# Patient Record
Sex: FEMALE | Race: WHITE | Employment: OTHER | URBAN - METROPOLITAN AREA
[De-identification: names, ages, dates, MRNs, and addresses within clinical notes are randomized per-mention and may not be internally consistent; named-entity substitution may affect disease eponyms.]

---

## 2019-03-06 ENCOUNTER — APPOINTMENT (OUTPATIENT)
Dept: GENERAL RADIOLOGY | Age: 44
End: 2019-03-06
Payer: MEDICARE

## 2019-03-06 ENCOUNTER — APPOINTMENT (OUTPATIENT)
Dept: CT IMAGING | Age: 44
End: 2019-03-06
Payer: MEDICARE

## 2019-03-06 ENCOUNTER — HOSPITAL ENCOUNTER (EMERGENCY)
Age: 44
Discharge: HOME | End: 2019-03-07
Attending: EMERGENCY MEDICINE
Payer: MEDICARE

## 2019-03-06 DIAGNOSIS — L03.119 CELLULITIS OF LOWER EXTREMITY, UNSPECIFIED LATERALITY: Primary | ICD-10-CM

## 2019-03-06 DIAGNOSIS — R23.3 PETECHIAE: ICD-10-CM

## 2019-03-06 DIAGNOSIS — R60.0 BILATERAL LEG EDEMA: ICD-10-CM

## 2019-03-06 LAB
ANION GAP SERPL CALCULATED.3IONS-SCNC: 8 MMOL/L (ref 3–16)
APTT: 39.3 SEC (ref 26–36)
BASOPHILS ABSOLUTE: 0.1 K/UL (ref 0–0.2)
BASOPHILS RELATIVE PERCENT: 1 %
BUN BLDV-MCNC: 9 MG/DL (ref 7–20)
CALCIUM SERPL-MCNC: 8.5 MG/DL (ref 8.3–10.6)
CHLORIDE BLD-SCNC: 103 MMOL/L (ref 99–110)
CO2: 27 MMOL/L (ref 21–32)
CREAT SERPL-MCNC: 0.6 MG/DL (ref 0.6–1.1)
D DIMER: 2036 NG/ML DDU (ref 0–229)
EOSINOPHILS ABSOLUTE: 0.3 K/UL (ref 0–0.6)
EOSINOPHILS RELATIVE PERCENT: 5.2 %
GFR AFRICAN AMERICAN: >60
GFR NON-AFRICAN AMERICAN: >60
GLUCOSE BLD-MCNC: 113 MG/DL (ref 70–99)
HCT VFR BLD CALC: 37.5 % (ref 36–48)
HEMOGLOBIN: 12.8 G/DL (ref 12–16)
INR BLD: 1.38 (ref 0.86–1.14)
LYMPHOCYTES ABSOLUTE: 2.5 K/UL (ref 1–5.1)
LYMPHOCYTES RELATIVE PERCENT: 46.9 %
MCH RBC QN AUTO: 34.2 PG (ref 26–34)
MCHC RBC AUTO-ENTMCNC: 34 G/DL (ref 31–36)
MCV RBC AUTO: 100.5 FL (ref 80–100)
MONOCYTES ABSOLUTE: 0.5 K/UL (ref 0–1.3)
MONOCYTES RELATIVE PERCENT: 9.5 %
NEUTROPHILS ABSOLUTE: 2 K/UL (ref 1.7–7.7)
NEUTROPHILS RELATIVE PERCENT: 37.4 %
PDW BLD-RTO: 15 % (ref 12.4–15.4)
PLATELET # BLD: 84 K/UL (ref 135–450)
PLATELET SLIDE REVIEW: ABNORMAL
PMV BLD AUTO: 10 FL (ref 5–10.5)
POTASSIUM SERPL-SCNC: 3.7 MMOL/L (ref 3.5–5.1)
PRO-BNP: 70 PG/ML (ref 0–124)
PROTHROMBIN TIME: 15.7 SEC (ref 9.8–13)
RBC # BLD: 3.74 M/UL (ref 4–5.2)
SEDIMENTATION RATE, ERYTHROCYTE: 100 MM/HR (ref 0–20)
SODIUM BLD-SCNC: 138 MMOL/L (ref 136–145)
TROPONIN: <0.01 NG/ML
WBC # BLD: 5.3 K/UL (ref 4–11)

## 2019-03-06 PROCEDURE — 85652 RBC SED RATE AUTOMATED: CPT

## 2019-03-06 PROCEDURE — 6360000004 HC RX CONTRAST MEDICATION: Performed by: EMERGENCY MEDICINE

## 2019-03-06 PROCEDURE — 71260 CT THORAX DX C+: CPT

## 2019-03-06 PROCEDURE — 83880 ASSAY OF NATRIURETIC PEPTIDE: CPT

## 2019-03-06 PROCEDURE — 71045 X-RAY EXAM CHEST 1 VIEW: CPT

## 2019-03-06 PROCEDURE — 85379 FIBRIN DEGRADATION QUANT: CPT

## 2019-03-06 PROCEDURE — 80048 BASIC METABOLIC PNL TOTAL CA: CPT

## 2019-03-06 PROCEDURE — 84484 ASSAY OF TROPONIN QUANT: CPT

## 2019-03-06 PROCEDURE — 85025 COMPLETE CBC W/AUTO DIFF WBC: CPT

## 2019-03-06 PROCEDURE — 85610 PROTHROMBIN TIME: CPT

## 2019-03-06 PROCEDURE — 99284 EMERGENCY DEPT VISIT MOD MDM: CPT

## 2019-03-06 PROCEDURE — 93005 ELECTROCARDIOGRAM TRACING: CPT | Performed by: PHYSICIAN ASSISTANT

## 2019-03-06 PROCEDURE — 85730 THROMBOPLASTIN TIME PARTIAL: CPT

## 2019-03-06 RX ADMIN — IOPAMIDOL 75 ML: 755 INJECTION, SOLUTION INTRAVENOUS at 22:35

## 2019-03-06 ASSESSMENT — ENCOUNTER SYMPTOMS
DIARRHEA: 0
BACK PAIN: 0
STRIDOR: 0
ALLERGIC/IMMUNOLOGIC NEGATIVE: 1
WHEEZING: 0
ABDOMINAL PAIN: 0
ABDOMINAL DISTENTION: 0
CONSTIPATION: 0
SHORTNESS OF BREATH: 0
VOMITING: 0
NAUSEA: 0
COUGH: 0

## 2019-03-07 VITALS
SYSTOLIC BLOOD PRESSURE: 143 MMHG | WEIGHT: 220 LBS | HEART RATE: 88 BPM | OXYGEN SATURATION: 95 % | TEMPERATURE: 98 F | BODY MASS INDEX: 35.36 KG/M2 | DIASTOLIC BLOOD PRESSURE: 82 MMHG | RESPIRATION RATE: 16 BRPM | HEIGHT: 66 IN

## 2019-03-07 LAB
EKG ATRIAL RATE: 81 BPM
EKG DIAGNOSIS: NORMAL
EKG P AXIS: 52 DEGREES
EKG P-R INTERVAL: 152 MS
EKG Q-T INTERVAL: 418 MS
EKG QRS DURATION: 84 MS
EKG QTC CALCULATION (BAZETT): 485 MS
EKG R AXIS: 13 DEGREES
EKG T AXIS: 19 DEGREES
EKG VENTRICULAR RATE: 81 BPM

## 2019-03-07 PROCEDURE — 93010 ELECTROCARDIOGRAM REPORT: CPT | Performed by: INTERNAL MEDICINE

## 2019-03-07 RX ORDER — DOXYCYCLINE 100 MG/1
100 TABLET ORAL 2 TIMES DAILY
Qty: 20 TABLET | Refills: 0 | Status: SHIPPED | OUTPATIENT
Start: 2019-03-07 | End: 2019-03-17

## 2019-04-04 ENCOUNTER — HOSPITAL ENCOUNTER (INPATIENT)
Age: 44
LOS: 4 days | Discharge: HOME | DRG: 442 | End: 2019-04-09
Attending: EMERGENCY MEDICINE | Admitting: INTERNAL MEDICINE
Payer: MEDICARE

## 2019-04-04 DIAGNOSIS — R74.01 TRANSAMINITIS: ICD-10-CM

## 2019-04-04 DIAGNOSIS — R60.0 LEG EDEMA: ICD-10-CM

## 2019-04-04 DIAGNOSIS — D69.6 THROMBOCYTOPENIA (HCC): Primary | ICD-10-CM

## 2019-04-04 DIAGNOSIS — R23.3 PETECHIAE: ICD-10-CM

## 2019-04-04 LAB
A/G RATIO: 0.5 (ref 1.1–2.2)
ALBUMIN SERPL-MCNC: 2.7 G/DL (ref 3.4–5)
ALP BLD-CCNC: 248 U/L (ref 40–129)
ALT SERPL-CCNC: 124 U/L (ref 10–40)
ANION GAP SERPL CALCULATED.3IONS-SCNC: 7 MMOL/L (ref 3–16)
ANISOCYTOSIS: ABNORMAL
APTT: 40.3 SEC (ref 26–36)
AST SERPL-CCNC: 207 U/L (ref 15–37)
BASOPHILS ABSOLUTE: 0.1 K/UL (ref 0–0.2)
BASOPHILS RELATIVE PERCENT: 1 %
BILIRUB SERPL-MCNC: 1 MG/DL (ref 0–1)
BUN BLDV-MCNC: 12 MG/DL (ref 7–20)
CALCIUM SERPL-MCNC: 7.8 MG/DL (ref 8.3–10.6)
CHLORIDE BLD-SCNC: 104 MMOL/L (ref 99–110)
CO2: 25 MMOL/L (ref 21–32)
CREAT SERPL-MCNC: 0.6 MG/DL (ref 0.6–1.1)
EOSINOPHILS ABSOLUTE: 0.2 K/UL (ref 0–0.6)
EOSINOPHILS RELATIVE PERCENT: 3.7 %
FIBRINOGEN: 177 MG/DL (ref 200–397)
GFR AFRICAN AMERICAN: >60
GFR NON-AFRICAN AMERICAN: >60
GLOBULIN: 5.6 G/DL
GLUCOSE BLD-MCNC: 92 MG/DL (ref 70–99)
HCT VFR BLD CALC: 35.7 % (ref 36–48)
HEMOGLOBIN: 12.1 G/DL (ref 12–16)
INR BLD: 1.44 (ref 0.86–1.14)
LACTATE DEHYDROGENASE: 229 U/L (ref 100–190)
LYMPHOCYTES ABSOLUTE: 2.6 K/UL (ref 1–5.1)
LYMPHOCYTES RELATIVE PERCENT: 49.6 %
MACROCYTES: ABNORMAL
MCH RBC QN AUTO: 33.8 PG (ref 26–34)
MCHC RBC AUTO-ENTMCNC: 34 G/DL (ref 31–36)
MCV RBC AUTO: 99.7 FL (ref 80–100)
MONOCYTES ABSOLUTE: 0.6 K/UL (ref 0–1.3)
MONOCYTES RELATIVE PERCENT: 10.6 %
NEUTROPHILS ABSOLUTE: 1.8 K/UL (ref 1.7–7.7)
NEUTROPHILS RELATIVE PERCENT: 35.1 %
PDW BLD-RTO: 16.2 % (ref 12.4–15.4)
PLATELET # BLD: 65 K/UL (ref 135–450)
PMV BLD AUTO: 10.3 FL (ref 5–10.5)
POTASSIUM SERPL-SCNC: 3.5 MMOL/L (ref 3.5–5.1)
PROTHROMBIN TIME: 16.4 SEC (ref 9.8–13)
RBC # BLD: 3.58 M/UL (ref 4–5.2)
SLIDE REVIEW: ABNORMAL
SODIUM BLD-SCNC: 136 MMOL/L (ref 136–145)
TOTAL PROTEIN: 8.3 G/DL (ref 6.4–8.2)
WBC # BLD: 5.2 K/UL (ref 4–11)

## 2019-04-04 PROCEDURE — 85384 FIBRINOGEN ACTIVITY: CPT

## 2019-04-04 PROCEDURE — 85025 COMPLETE CBC W/AUTO DIFF WBC: CPT

## 2019-04-04 PROCEDURE — 99284 EMERGENCY DEPT VISIT MOD MDM: CPT

## 2019-04-04 PROCEDURE — 80074 ACUTE HEPATITIS PANEL: CPT

## 2019-04-04 PROCEDURE — 87341 HEP B SURFACE AG NEUTRLZJ IA: CPT

## 2019-04-04 PROCEDURE — 83615 LACTATE (LD) (LDH) ENZYME: CPT

## 2019-04-04 PROCEDURE — 6370000000 HC RX 637 (ALT 250 FOR IP): Performed by: EMERGENCY MEDICINE

## 2019-04-04 PROCEDURE — 80053 COMPREHEN METABOLIC PANEL: CPT

## 2019-04-04 PROCEDURE — 85730 THROMBOPLASTIN TIME PARTIAL: CPT

## 2019-04-04 PROCEDURE — 85610 PROTHROMBIN TIME: CPT

## 2019-04-04 RX ORDER — DOXYCYCLINE 100 MG/1
100 CAPSULE ORAL 2 TIMES DAILY
Qty: 20 CAPSULE | Refills: 0 | Status: SHIPPED | OUTPATIENT
Start: 2019-04-04 | End: 2019-04-04

## 2019-04-04 RX ORDER — DOXYCYCLINE HYCLATE 100 MG/1
100 CAPSULE ORAL ONCE
Status: COMPLETED | OUTPATIENT
Start: 2019-04-04 | End: 2019-04-04

## 2019-04-04 RX ADMIN — DOXYCYCLINE HYCLATE 100 MG: 100 CAPSULE ORAL at 21:07

## 2019-04-04 ASSESSMENT — PAIN SCALES - GENERAL: PAINLEVEL_OUTOF10: 6

## 2019-04-05 PROBLEM — D69.6 THROMBOCYTOPENIA (HCC): Status: ACTIVE | Noted: 2019-04-05

## 2019-04-05 PROBLEM — Z72.0 TOBACCO ABUSE: Status: ACTIVE | Noted: 2019-04-05

## 2019-04-05 PROBLEM — L03.90 CELLULITIS: Status: ACTIVE | Noted: 2019-04-05

## 2019-04-05 PROBLEM — L03.119 CELLULITIS AND ABSCESS OF LOWER EXTREMITY: Status: ACTIVE | Noted: 2019-04-05

## 2019-04-05 PROBLEM — B19.20 HEPATITIS C: Status: ACTIVE | Noted: 2019-04-05

## 2019-04-05 PROBLEM — B19.10 HEPATITIS B: Status: ACTIVE | Noted: 2019-04-05

## 2019-04-05 PROBLEM — F19.90 IVDU (INTRAVENOUS DRUG USER): Status: ACTIVE | Noted: 2019-04-05

## 2019-04-05 PROBLEM — L02.419 CELLULITIS AND ABSCESS OF LOWER EXTREMITY: Status: ACTIVE | Noted: 2019-04-05

## 2019-04-05 LAB
ALBUMIN SERPL-MCNC: 3 G/DL (ref 3.4–5)
ALP BLD-CCNC: 317 U/L (ref 40–129)
ALT SERPL-CCNC: 145 U/L (ref 10–40)
AMPHETAMINE SCREEN, URINE: POSITIVE
ANION GAP SERPL CALCULATED.3IONS-SCNC: 7 MMOL/L (ref 3–16)
ANTI-NUCLEAR ANTIBODY (ANA): NEGATIVE
AST SERPL-CCNC: 238 U/L (ref 15–37)
BARBITURATE SCREEN URINE: ABNORMAL
BENZODIAZEPINE SCREEN, URINE: ABNORMAL
BILIRUB SERPL-MCNC: 1.4 MG/DL (ref 0–1)
BILIRUBIN DIRECT: 0.6 MG/DL (ref 0–0.3)
BILIRUBIN URINE: NEGATIVE
BILIRUBIN, INDIRECT: 0.8 MG/DL (ref 0–1)
BLOOD SMEAR REVIEW: NORMAL
BLOOD, URINE: NEGATIVE
BUN BLDV-MCNC: 9 MG/DL (ref 7–20)
CALCIUM SERPL-MCNC: 8.4 MG/DL (ref 8.3–10.6)
CANNABINOID SCREEN URINE: ABNORMAL
CHLORIDE BLD-SCNC: 101 MMOL/L (ref 99–110)
CLARITY: CLEAR
CO2: 27 MMOL/L (ref 21–32)
COCAINE METABOLITE SCREEN URINE: ABNORMAL
COLOR: YELLOW
CREAT SERPL-MCNC: 0.6 MG/DL (ref 0.6–1.1)
GFR AFRICAN AMERICAN: >60
GFR NON-AFRICAN AMERICAN: >60
GLUCOSE BLD-MCNC: 79 MG/DL (ref 70–99)
GLUCOSE URINE: NEGATIVE MG/DL
HAV IGM SER IA-ACNC: ABNORMAL
HBV SURFACE AB TITR SER: <3.5 MIU/ML
HCT VFR BLD CALC: 41.1 % (ref 36–48)
HEMOGLOBIN: 13.8 G/DL (ref 12–16)
HEPATITIS B CORE IGM ANTIBODY: REACTIVE
HEPATITIS B SURFACE ANTIGEN INTERPRETATION: REACTIVE
HEPATITIS C ANTIBODY INTERPRETATION: REACTIVE
HIV AG/AB: NORMAL
HIV ANTIGEN: NORMAL
HIV-1 ANTIBODY: NORMAL
HIV-2 AB: NORMAL
IGA: 880 MG/DL (ref 70–400)
IGG: 3626 MG/DL (ref 700–1600)
IGM: 514 MG/DL (ref 40–230)
INR BLD: 1.37 (ref 0.86–1.14)
KETONES, URINE: NEGATIVE MG/DL
LEUKOCYTE ESTERASE, URINE: NEGATIVE
Lab: ABNORMAL
MCH RBC QN AUTO: 33.5 PG (ref 26–34)
MCHC RBC AUTO-ENTMCNC: 33.6 G/DL (ref 31–36)
MCV RBC AUTO: 99.8 FL (ref 80–100)
METHADONE SCREEN, URINE: ABNORMAL
MICROSCOPIC EXAMINATION: ABNORMAL
NITRITE, URINE: NEGATIVE
OPIATE SCREEN URINE: ABNORMAL
OXYCODONE URINE: ABNORMAL
PDW BLD-RTO: 16.6 % (ref 12.4–15.4)
PH UA: 8
PH UA: 8 (ref 5–8)
PHENCYCLIDINE SCREEN URINE: ABNORMAL
PLATELET # BLD: 71 K/UL (ref 135–450)
PMV BLD AUTO: 11 FL (ref 5–10.5)
POTASSIUM SERPL-SCNC: 3.9 MMOL/L (ref 3.5–5.1)
PROPOXYPHENE SCREEN: ABNORMAL
PROTEIN UA: NEGATIVE MG/DL
PROTHROMBIN TIME: 15.6 SEC (ref 9.8–13)
RBC # BLD: 4.12 M/UL (ref 4–5.2)
SODIUM BLD-SCNC: 135 MMOL/L (ref 136–145)
SPECIFIC GRAVITY UA: 1.02 (ref 1–1.03)
TOTAL PROTEIN: 10.1 G/DL (ref 6.4–8.2)
URINE REFLEX TO CULTURE: ABNORMAL
URINE TYPE: ABNORMAL
UROBILINOGEN, URINE: 2 E.U./DL
WBC # BLD: 4.9 K/UL (ref 4–11)

## 2019-04-05 PROCEDURE — 87902 NFCT AGT GNTYP ALYS HEP C: CPT

## 2019-04-05 PROCEDURE — 85027 COMPLETE CBC AUTOMATED: CPT

## 2019-04-05 PROCEDURE — 87517 HEPATITIS B DNA QUANT: CPT

## 2019-04-05 PROCEDURE — G0378 HOSPITAL OBSERVATION PER HR: HCPCS

## 2019-04-05 PROCEDURE — 86704 HEP B CORE ANTIBODY TOTAL: CPT

## 2019-04-05 PROCEDURE — 86255 FLUORESCENT ANTIBODY SCREEN: CPT

## 2019-04-05 PROCEDURE — 87350 HEPATITIS BE AG IA: CPT

## 2019-04-05 PROCEDURE — 1200000000 HC SEMI PRIVATE

## 2019-04-05 PROCEDURE — 84155 ASSAY OF PROTEIN SERUM: CPT

## 2019-04-05 PROCEDURE — 36415 COLL VENOUS BLD VENIPUNCTURE: CPT

## 2019-04-05 PROCEDURE — 86707 HEPATITIS BE ANTIBODY: CPT

## 2019-04-05 PROCEDURE — 87390 HIV-1 AG IA: CPT

## 2019-04-05 PROCEDURE — 82784 ASSAY IGA/IGD/IGG/IGM EACH: CPT

## 2019-04-05 PROCEDURE — 2580000003 HC RX 258: Performed by: INTERNAL MEDICINE

## 2019-04-05 PROCEDURE — 81003 URINALYSIS AUTO W/O SCOPE: CPT

## 2019-04-05 PROCEDURE — 80307 DRUG TEST PRSMV CHEM ANLYZR: CPT

## 2019-04-05 PROCEDURE — 99223 1ST HOSP IP/OBS HIGH 75: CPT | Performed by: INTERNAL MEDICINE

## 2019-04-05 PROCEDURE — 6370000000 HC RX 637 (ALT 250 FOR IP): Performed by: FAMILY MEDICINE

## 2019-04-05 PROCEDURE — 2580000003 HC RX 258: Performed by: FAMILY MEDICINE

## 2019-04-05 PROCEDURE — 86702 HIV-2 ANTIBODY: CPT

## 2019-04-05 PROCEDURE — 87522 HEPATITIS C REVRS TRNSCRPJ: CPT

## 2019-04-05 PROCEDURE — 87040 BLOOD CULTURE FOR BACTERIA: CPT

## 2019-04-05 PROCEDURE — 82595 ASSAY OF CRYOGLOBULIN: CPT

## 2019-04-05 PROCEDURE — 80076 HEPATIC FUNCTION PANEL: CPT

## 2019-04-05 PROCEDURE — 84311 SPECTROPHOTOMETRY: CPT

## 2019-04-05 PROCEDURE — 86038 ANTINUCLEAR ANTIBODIES: CPT

## 2019-04-05 PROCEDURE — 86706 HEP B SURFACE ANTIBODY: CPT

## 2019-04-05 PROCEDURE — 6360000002 HC RX W HCPCS: Performed by: FAMILY MEDICINE

## 2019-04-05 PROCEDURE — 86701 HIV-1ANTIBODY: CPT

## 2019-04-05 PROCEDURE — 80048 BASIC METABOLIC PNL TOTAL CA: CPT

## 2019-04-05 PROCEDURE — 84165 PROTEIN E-PHORESIS SERUM: CPT

## 2019-04-05 PROCEDURE — 85610 PROTHROMBIN TIME: CPT

## 2019-04-05 PROCEDURE — 87641 MR-STAPH DNA AMP PROBE: CPT

## 2019-04-05 RX ORDER — SODIUM CHLORIDE 9 MG/ML
INJECTION, SOLUTION INTRAVENOUS CONTINUOUS
Status: DISCONTINUED | OUTPATIENT
Start: 2019-04-05 | End: 2019-04-09

## 2019-04-05 RX ORDER — TRAZODONE HYDROCHLORIDE 50 MG/1
150 TABLET ORAL NIGHTLY
Status: ON HOLD | COMMUNITY
Start: 2016-10-06 | End: 2019-04-09 | Stop reason: HOSPADM

## 2019-04-05 RX ORDER — ONDANSETRON 2 MG/ML
4 INJECTION INTRAMUSCULAR; INTRAVENOUS EVERY 6 HOURS PRN
Status: DISCONTINUED | OUTPATIENT
Start: 2019-04-05 | End: 2019-04-09 | Stop reason: HOSPADM

## 2019-04-05 RX ORDER — NICOTINE 21 MG/24HR
1 PATCH, TRANSDERMAL 24 HOURS TRANSDERMAL DAILY
Status: DISCONTINUED | OUTPATIENT
Start: 2019-04-05 | End: 2019-04-09 | Stop reason: HOSPADM

## 2019-04-05 RX ORDER — SODIUM CHLORIDE 0.9 % (FLUSH) 0.9 %
10 SYRINGE (ML) INJECTION PRN
Status: DISCONTINUED | OUTPATIENT
Start: 2019-04-05 | End: 2019-04-09 | Stop reason: HOSPADM

## 2019-04-05 RX ORDER — SODIUM CHLORIDE 0.9 % (FLUSH) 0.9 %
10 SYRINGE (ML) INJECTION EVERY 12 HOURS SCHEDULED
Status: DISCONTINUED | OUTPATIENT
Start: 2019-04-05 | End: 2019-04-09 | Stop reason: HOSPADM

## 2019-04-05 RX ORDER — GABAPENTIN 300 MG/1
800 CAPSULE ORAL 3 TIMES DAILY PRN
Status: ON HOLD | COMMUNITY
Start: 2017-01-31 | End: 2019-04-09 | Stop reason: HOSPADM

## 2019-04-05 RX ADMIN — CEFEPIME HYDROCHLORIDE 2 G: 2 INJECTION, POWDER, FOR SOLUTION INTRAVENOUS at 12:16

## 2019-04-05 RX ADMIN — Medication 10 ML: at 21:29

## 2019-04-05 RX ADMIN — VANCOMYCIN HYDROCHLORIDE 1500 MG: 10 INJECTION, POWDER, LYOPHILIZED, FOR SOLUTION INTRAVENOUS at 13:42

## 2019-04-05 RX ADMIN — SODIUM CHLORIDE: 9 INJECTION, SOLUTION INTRAVENOUS at 15:41

## 2019-04-05 RX ADMIN — Medication 10 ML: at 08:20

## 2019-04-05 ASSESSMENT — PAIN DESCRIPTION - PAIN TYPE
TYPE: ACUTE PAIN

## 2019-04-05 ASSESSMENT — PAIN DESCRIPTION - LOCATION
LOCATION: LEG

## 2019-04-05 ASSESSMENT — PAIN DESCRIPTION - ORIENTATION
ORIENTATION: RIGHT;LEFT
ORIENTATION: RIGHT;LEFT
ORIENTATION: LEFT

## 2019-04-05 ASSESSMENT — PAIN SCALES - GENERAL
PAINLEVEL_OUTOF10: 8
PAINLEVEL_OUTOF10: 6
PAINLEVEL_OUTOF10: 6

## 2019-04-05 NOTE — CARE COORDINATION
Met w/pt regarding drug rehab/treatment programs. Pt stated she isn't using everyday, stated she uses \"street drugs\"-stated specifically heroin. She stated she isn't interested in an inpt stay, as she has to work. Provided pt w/a list of drug treatment facilities. Informed pt that she would need to call if she is interested in pursuing treatment.   Pt understands and accepted list.  Electronically signed by ISABELL Mcpherson on 4/5/2019 at 3:33 PM

## 2019-04-05 NOTE — CARE COORDINATION
Discharge Planning Assessment  RN/SW discharge planner met with patient/(and family member) to discuss reason for admission, current living situation, and potential needs at the time of discharge    Demographics/Insurance verified Yes    Current type of dwelling: currently staying in hotel-stated working on securing housing    Living arrangements: w/boyfriend    Level of function/Support:stated independent w/ADL's    PCP:none-provided list    Last Visit to PCP:na    DME:stated none    Active with any community resources/agencies/skilled home care:na    Medication compliance issues:na-no PCP    Financial issues that could impact healthcare: no PCP at this time    Transportation at the time of discharge: stated none    Tentative discharge plan:Pt would benefit from meds to beds as she has no transportation. Informed pt that meds to beds is not avail on weekend and would need to use pharmacy in community. Will likely need transportation assistance on dc. Pt stated she has no PCP-provided her w/PCP list. Otherwise pt stated no further d/c needs.     Electronically signed by ISABELL Simons on 4/5/2019 at 12:47 PM

## 2019-04-05 NOTE — ED NOTES
Report given to 5T RN over phone. Pt alert and oriented and shows no signs of distress at time of transfer to  569. Pt taken to room by ED Tech in wheelchair.         Juni Conte RN  04/05/19 3712

## 2019-04-05 NOTE — PROGRESS NOTES
Pt admitted to [address] from ED. A&Ox4. VSS. Pt oriented to room and call light. No further needs at this time. Will continue to monitor.

## 2019-04-05 NOTE — CONSULTS
Infectious Diseases   Consult Note        Admission Date: 4/4/2019  Hospital Day: Hospital Day: 3  Attending: Carlton Donato MD  Date of service: 4/5/19    Presenting complaint:   Chief Complaint   Patient presents with    Rash     to bilat lower legs treated for \"infection\" in right leg last month. no fevers pt states her legs are both swollen \"but not as bad as last time\"        Reason for admission: Thrombocytopenia ([address] [D69.6]  Thrombocytopenia ([address] [D69.6]  Cellulitis [L03.90]  Thrombocytopenia ([address] [D69.6]    Chief complaint/ Reason for consult: b/l leg rash    Problem list:       Patient Active Problem List   Diagnosis Code    Knee pain, bilateral M25.561, M25.562    Right knee DJD M17.11    Left knee DJD M17.12    Thrombocytopenia ([address] D69.6    Cellulitis and abscess of lower extremity L03.119, L02.419    Tobacco abuse Z72.0    IVDU (intravenous drug user) F19.90    Acute hepatitis B B16.9    Hepatitis C B19.20    Cellulitis L03.90    Petechiae R23.3    Leg edema R60.0    Transaminitis R74.0    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 old female who  has a past medical history of Arthritis and Asthma. with following problems:    · Left leg rash - vasculitis rash suspected secondary to Hep C or illicit drugs  · Acute hepatitis B with tranasminitis  · Chronic hepatitis c  · IV drug user - amphetamine abuse  · Worsening thrombocytopenia  · History of asthma  · Previous CT scan concerning for Pulmonary hypertension  · Obesity Class 2 due to excess calorie intake : Body mass index is 35.51 kg/m².         Discussion:      No sign of infection, Rash seems related to thrombocytopenia. Also need to r/o vasculitis related to Hep C or related to illicit drugs. Hep C can also cause rashes by causing cryoglobulinemia or porphyrias         Plan:     Diagnostic Workup:    · Will order urine tox screen  · Will order HIV screen  · Get Hep C RNA quant PCR and Hep C genotype  · Will order LEIGH with reflex and ANCA  · Will order nasal MRSA screen  · Continue to follow fever curve, WBC count and blood cultures  · Check cryoglobulin level and prophyrin level  · Follow up on liverand renal functions closely    Antimicrobials:    · No role of antibiotics. Will stop iv vancomycin  · Will stop iv cefepime  · Will recommend watching him off antibiotics  · He is afebrile. · Pain control  · DVT prophyalxis  · Discussed the above plan with patient and RN         I/v access Management:    · Continue to monitor i.v access sites for erythema, induration, discharge or tenderness. · As always, continue efforts to minimizetubes/lines/drains as clinically appropriate to reduce chances of line associated infections. Patient education and counseling:      · The patient was educated in detailabout the side-effects of various antibiotics and things to watch for like new rashes, lip swelling, severe reaction, worsening diarrhea, break through fever etc.  · Discussed patient'scondition and what to expect. All of the patient's questions were addressed in a satisfactory manner and patient verbalized understanding all instructions. Risk of Complications/Morbidity: High     Illicit drug use and tobacco use disorder counselin. I have counseled the patient extensively about risks of using illicit drugs and have encouraged the patient to maintain a healthy drug-free lifestyle. Information was given about various substance use prevention programs available in the area and their websites including www. addicted. org, www.madd. org, www.catsober. org, www.[facility]. com and www. addictionservicescouncil.org.  2. I have counseled the patient extensively about risks of smoking and have encouraged the patient to maintain a healthy smoke-free lifestyle. Information was given about various smoking cessation programs and their websites like www.smokefree.gov, [place]. quitlogix. org as well as help lines like 1-800-QUIT-NOW and 1-800-LUNG-[place]. Thank you for involving me in the care of your patient. I will continue to follow. If you have any additional questions, please do not hesitate to contact me. Subjective:       HPI: Orvilla Kanner is a 38 y.o. female patient, who was seen at the request of Dr. Abril Mehta MD.    History was obtained from chart review and the patient. The patient was admitted on 4/4/2019. I have been consulted to see the patient for above mentioned reason(s). The patient has multiple medical comorbidities, and presented to the ER for diffuse rash on bilateral lower extremities with burning pain in the legs, more pronounced in the left leg as compared to right    The patient apparently had an ER visit about a month ago for similar presentation and was discharged on doxycycline from ER. She does not have a PCP and did not follow-up with anyone as outpatient    She is an active IV drug user      I have been asked to see the patient for this complicated infection. Past Medical History: All past medical history reviewed today. Past Medical History:   Diagnosis Date    Arthritis     Asthma          Past Surgical History: All pastsurgical history was reviewed today. History reviewed. No pertinent surgical history. Social History:  All social andepidemiologic history was reviewed and updated by me today as needed. · Tobacco use:   reports that she has been smoking cigarettes. She has never used smokeless tobacco.  · Alcohol use:   reports that she does not drink alcohol.   · Currently lives in: [facility]  · reports that she does not use drugs. Immunization History: All immunization history was reviewed by me today. There is no immunization history on file for this patient. Family History: All family history was reviewed today. Problem Relation Age of Onset    Arthritis Other     Asthma Other     Cancer Other     Diabetes Other     High Blood Pressure Other          Medications: All current and past medications were reviewed. Medications Prior to Admission: gabapentin (NEURONTIN) 300 MG capsule, Take 800 mg by mouth 3 times daily as needed. traZODone (DESYREL) 50 MG tablet, Take 150 mg by mouth nightly     morphine  4 mg Intravenous Once    sodium chloride flush  10 mL Intravenous 2 times per day    nicotine  1 patch Transdermal Daily       Current antibiotics: All antibiotics and their doses were reviewed by me    Recent Abx Admin                   vancomycin (VANCOCIN) 1,500 mg in dextrose 5 % 250 mL IVPB (mg) 1,500 mg New Bag 04/05/19 1342    cefepime (MAXIPIME) 2 g IVPB minibag (g) 2 g New Bag 04/05/19 1216                   REVIEW OF SYSTEMS:       Review of Systems   Constitutional: Negative for chills, diaphoresis and unexpected weight change. HENT: Negative for congestion, ear discharge, ear pain, facial swelling, hearing loss, rhinorrhea and trouble swallowing. Eyes: Negative for photophobia, discharge, redness and visual disturbance. Respiratory: Negative for apnea, cough, choking, chest tightness, shortness of breath and stridor. Cardiovascular: Negative for chest pain and palpitations. Gastrointestinal: Negative for abdominal pain, blood in stool, diarrhea and nausea. Endocrine: Negative for polydipsia, polyphagia and polyuria. Genitourinary: Negative for difficulty urinating, dysuria, frequency, hematuria, menstrual problem and vaginal discharge. Musculoskeletal: Negative for arthralgias, joint swelling, myalgias and neck stiffness.    Skin: Positive for rash (Both legs, severe on the left leg). Negative for color change. Allergic/Immunologic: Negative for immunocompromised state. Neurological: Negative for dizziness, seizures, speech difficulty, light-headedness and headaches. Hematological: Negative for adenopathy. Psychiatric/Behavioral: Negative for agitation, hallucinations and suicidal ideas. Objective:       PHYSICAL EXAM:      Vitals:   Vitals:    04/05/19 1630 04/05/19 1834 04/05/19 2126 04/05/19 2333   BP: (!) 162/99 (!) 145/89 (!) 144/92 (!) 146/87   Pulse: 82 84 88 78   Resp: 14 16 16 16   Temp: 98.2 °F (36.8 °C) 98 °F (36.7 °C) 98.5 °F (36.9 °C) 98.3 °F (36.8 °C)   TempSrc: Oral Oral Oral Oral   SpO2: 99% 99% 97% 98%   Weight:       Height:           Physical Exam   Constitutional: She is oriented to person, place, and time. She appears well-developed. No distress. HENT:   Head: Normocephalic. Right Ear: External ear normal.   Left Ear: External ear normal.   Nose: Nose normal.   Mouth/Throat: Oropharynx is clear and moist.   Eyes: Pupils are equal, round, and reactive to light. Conjunctivae are normal. Right eye exhibits no discharge. Left eye exhibits no discharge. No scleral icterus. Neck: Normal range of motion. Neck supple. Cardiovascular: Normal rate and regular rhythm. Exam reveals no friction rub. No murmur heard. Pulmonary/Chest: Effort normal. No stridor. She has no wheezes. She has no rales. She exhibits no tenderness. Abdominal: Soft. She exhibits no mass. There is no tenderness. There is no rebound and no guarding. Musculoskeletal: She exhibits no edema or tenderness. Lymphadenopathy:     She has no cervical adenopathy. Neurological: She is alert and oriented to person, place, and time. She exhibits normal muscle tone. Skin: Skin is warm and dry. Rash (Bilateral legs, severe on the left leg) noted. She is not diaphoretic. No erythema. Psychiatric: She has a normal mood and affect.  Judgment normal. Nursing note and vitals reviewed. Lines: All vascular access sites are healthy with no local erythema, discharge or tenderness. Intake and output:     I/O last 3 completed shifts: In: 368 [I.V.:368]  Out: -     Lab Data:   All available labs were reviewed by me today. CBC:   Recent Labs     04/04/19 2111 04/05/19  1635   WBC 5.2 4.9   RBC 3.58* 4.12   HGB 12.1 13.8   HCT 35.7* 41.1   PLT 65* 71*   MCV 99.7 99.8   MCH 33.8 33.5   MCHC 34.0 33.6   RDW 16.2* 16.6*        BMP:  Recent Labs     04/04/19 2111 04/05/19  1635    135*   K 3.5 3.9    101   CO2 25 27   BUN 12 9   CREATININE 0.6 0.6   CALCIUM 7.8* 8.4   GLUCOSE 92 79        Hepatic FunctionPanel:   Lab Results   Component Value Date    ALKPHOS 317 04/05/2019     04/05/2019     04/05/2019    PROT 10.1 04/05/2019    BILITOT 1.4 04/05/2019    BILIDIR 0.6 04/05/2019    IBILI 0.8 04/05/2019    LABALBU 3.0 04/05/2019       CPK: No results found for: CKTOTAL  ESR:   Lab Results   Component Value Date    SEDRATE 100 (H) 03/06/2019     CRP: No results found for: CRP      Imaging: All pertinent images and reports for the current visit were reviewed by meduring this visit. VL EXTREMITY VENOUS BILATERAL    (Results Pending)   US GALLBLADDER RUQ    (Results Pending)       Outside records:    Labs, Microbiology, Radiology and pertinent results from Care everywhere, if available, were reviewed as a part ofthe consultation. Known drug Allergies: All allergies were reviewed and updated    Allergies   Allergen Reactions    Darvocet A500 [Propoxyphene N-Acetaminophen]     Penicillins          Please note that this chart was generated using Dragon dictation software. Although every effort was made to ensure the accuracy of this automated transcription, some errors in transcription may have occurred inadvertently.  If you may need any clarification, please do not hesitate to contact me through [facility] or at the phone number provided below with my electronic signature.       Harish Husain MD, MPH  4/6/2019, 12:30 AM  [facility] Infectious Disease   Office: [phone]  Fax: [phone]  Tuesday AM clinic:   [address] [facility] [address]  Thursday AM clinic: [address] [facility]

## 2019-04-05 NOTE — CONSULTS
Gastroenterology Consult Note      Patient: Ginny Santos  : 1975  Acct#:      Date:  2019    Subjective:       History of Present Illness  Patient is a 38 y.o.  female admitted with Thrombocytopenia ([facility] [address] [D69.6]  Thrombocytopenia ([facility] [address] [D69.6] who is seen in consult for elevated liver enzymes. She has h/o asthma, arthritis, and IVDA. She came to the ED a month ago for rash of the BLE. She was treated with 10 day course of doxycycline but didn't f/u with anyone. States the rash resolved then returned the other day. This was concerning for a vasculitis. Also noted to have thrombocytopenia. Noted to have elevated transaminases. No prior dx of liver disease. No alcohol use. Has used IV heroin off and on for 9 years. Last used 1 month ago. Has never been checked for Hepatitis B or C in the past. No alcohol use. Takes ibuprofen occasionally but otherwise no medications (other than doxycycline) or herbals. Sometimes has a pressure sensation in the RUQ but no pain. No N/V. No melena or hematochezia. Past Medical History:   Diagnosis Date    Arthritis     Asthma       History reviewed. No pertinent surgical history. Past Endoscopic History: none     Admission Meds  No current facility-administered medications on file prior to encounter. No current outpatient medications on file prior to encounter.             Allergies  Allergies   Allergen Reactions    Darvocet A500 [Propoxyphene N-Acetaminophen]     Penicillins       Social   Social History     Tobacco Use    Smoking status: Current Every Day Smoker     Types: Cigarettes    Smokeless tobacco: Never Used   Substance Use Topics    Alcohol use: No        Family History   Problem Relation Age of Onset    Arthritis Other     Asthma Other     Cancer Other     Diabetes Other     High Blood Pressure Other       Review of Systems  Constitutional: negative for fevers, chills, sweats    Ears, nose, mouth, throat, and face: negative for nasal congestion and sore throat   Respiratory: negative for cough and shortness of breath   Cardiovascular: negative for chest pain and dyspnea   Gastrointestinal: see hpi   Genitourinary:negative for dysuria and frequency   Integument/breast: negative for pruritus and rash   Hematologic/lymphatic: negative for bleeding and easy bruising   Musculoskeletal:negative for arthralgias and myalgias   Neurological: negative for dizziness and weakness       Physical Exam  Blood pressure (!) 146/90, pulse 74, temperature 98.5 °F (36.9 °C), temperature source Oral, resp. rate 16, height 5' 6\" (1.676 m), weight 220 lb (99.8 kg), SpO2 99 %, not currently breastfeeding. General appearance: alert, cooperative, no distress, appears stated age  Eyes: Anicteric  Head: Normocephalic, without obvious abnormality  Lungs: clear to auscultation bilaterally, Normal Effort  Heart: regular rate and rhythm, normal S1 and S2, no murmurs or rubs  Abdomen: soft, non-tender. Bowel sounds normal. No masses,  no organomegaly. Extremities: atraumatic, no cyanosis. Trace edema BLE  Skin: warm and dry, no jaundice, spider angiomata chest, macules over arms. purple macules and large patches over BLE. Neuro: Grossly intact, A&OX3  Musculoskeletal: 5/5  strength BUE      Data Review:    Recent Labs     04/04/19 2111   WBC 5.2   HGB 12.1   HCT 35.7*   MCV 99.7   PLT 65*     Recent Labs     04/04/19 2111      K 3.5      CO2 25   BUN 12   CREATININE 0.6     Recent Labs     04/04/19 2111   *   *   BILITOT 1.0   ALKPHOS 248*     No results for input(s): LIPASE, AMYLASE in the last 72 hours. Recent Labs     04/04/19 2111   PROTIME 16.4*   INR 1.44*     No results for input(s): PTT in the last 72 hours. No results for input(s): OCCULTBLD in the last 72 hours. Assessment:     Active Problems: Thrombocytopenia [address] [address]  Resolved Problems:    * No resolved hospital problems. *    Hepatitis - , . Alk phos 248 with normal bili. transaminases in the 50-60's in 2016 per labs in [facility]. No prior dx of liver disease. Viral hepatitis panel is pending. She is high risk for Hep B and C given IV drug use and these could cause cryoglobulinemia and vasculitis. Will need imaging of liver. There are signs of synthetic liver dysfunction (INR elevated at 1.44, albumin low at 2.7). Doxycycline can cause elevated liver enzymes. Thrombocytopenia - heme working up. Petechial rash - concerning for vasculitis. Cryoglobulin sent per heme. IVDA    Recommendations:   - f/u acute hepatitis panel  - f/u cryoglobulin per heme  - HIV labs have been sent  - check US of liver vs CT  - follow liver enzymes, INR  - heme following and ID consulted as well  - if Hep C positive, then check genotype and RNA level. If Hep B positive, check DNA level.   - drug cessation. SW consult for rehab programs. Discussed with Dr. Linnette Lama, [address]    I have personally performed a face to face diagnostic evaluation on this patient. I have interviewed and examined the patient and I agree with the findings and recommended plan of care. 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 Ventral palpable hernia but nontender, lower ext bilat rash but no skin ulcers and no apparent ischemia of digits, abnl transaminases, appears acute hep b (+core igm ab and +surface ag) and exposure to hep c (+hep c ab) with neg hiv. rec check hep c rna and genotype to see if past hep c cleared or if active infection. Await cryogolubulin level. Check u/s liver (hold off iv contrast /ct as creat nl but if cryoglobulin + can affect kidney in future). CT chest march 2019 upper abd images reported nl. Can consider future ct abdomen (also to assess ? mid abd hernia) when acute issues resolve to avoid stress/challenge of kidney. Once all data obtained then further recs. In abscence of more severe vasculitis manifestions (renal/cardiac/pulm disease/skin ulcers) may wait to see if clears hep b on own.  Recheck cbc,cmp,inr today and in am.       Maida Bocanegra MD  [address]

## 2019-04-05 NOTE — H&P
[place] [address]                     350 Located within [facility], [address]                              HISTORY AND PHYSICAL    PATIENT NAME: Gina Graham                 :        1975  MED REC NO:   [id number]                          ROOM:       3846  ACCOUNT NO:   [de-identified]                           ADMIT DATE: 2019  PROVIDER:     Greg Ayala MD    DATE OF SERVICE:  I obtained the history and performed a physical exam  on the patient in the emergency room on 2019. CHIEF COMPLAINT:  Rash on the legs. HISTORY OF PRESENT ILLNESS:  A 56-year-old  female who presents  to the hospital with chief complaints of what she describes as 3-4 days  of petechial initially spotty now confluent increasing rash on bilateral  lower extremity that she states is more on the left than the right,  associated with some burning pain in that area without nausea, vomiting,  fevers or chills. She notes this is similar to the rash she had a month  ago when she presented to the hospital emergency room, was given an  antibiotic and was discharged home. She was supposed to followup with  physician in the outpatient setting, but she never did. PAST MEDICAL HISTORY:  Obesity with a BMI of 35.6 kg/m2. PAST SURGICAL HISTORY:  No major procedures. ALLERGIC HISTORY:  DARVON. FAMILY HISTORY:  Reviewed by me and is currently noncontributory. SOCIAL HISTORY:  Active intravenous drug user with last use around a  week and half to two weeks ago. Smokes cigarettes. MEDICATIONS:  The patient's home medication list reviewed. The patient  is not on any medications at home. REVIEW OF SYSTEMS:  The patient's review of systems is significant for  the rash on the bilateral lower extremity and per the history of present  illness. All other systems have been reviewed and are negative except  for the history of present illness.     PHYSICAL EXAMINATION:  VITAL SIGNS:  Temperature 98.1, respiratory rate 16, pulse 80, blood  pressure 151/89, saturating 99%. CNS:  Alert, awake and oriented to time, place and person. PSYCH:  The patient is cooperative. EYES:  Pupils are reactive to light. Extraocular muscle movements are  intact. RESPIRATORY SYSTEM:  No rales, rubs, or rhonchi. CVS:  S1, S2 are heard. No murmurs or rubs. ABDOMEN:  Soft. MUSCULOSKELETAL:  No acute deformities. SKIN:  The patient has got significant evidence of petechial rash over  the bilateral lower extremity slightly palpable/raised. In the left  lower extremity, there are areas of confluence without any evidence of  surrounding cellulitis. LYMPH NODE:  Lymph node examination does not reveal any abnormal  lymphadenopathy. DIAGNOSTIC DATA:  CBC showed white count of 5.8 with hemoglobin of 12.1. Comprehensive metabolic panel showed alk phos 248, , . INR is 1.44. Lactate dehydrogenase is 229. Fibrinogen level is 177  slightly low. REVIEW OF PREVIOUS MEDICAL RECORDS:  Shows a CT abdomen and pelvis with  contrast, a FAST exam that showed no evidence of abnormalities that was  from 2016. The patient does not seem to have had any serological  testing that I can see. BLOOD WORK:  We have requested hepatitis panel acute. CONSULTATIONS:  ER requested a consultation to Hematology. ASSESSMENT:  1. Thrombocytopenia and what appears to be possible early peripheral  vasculitis. 2.  Acute/chronic hepatitis, probably hepatitis C related to the  patient's active intravenous drug use. 3.  Active intravenous drug use. PLAN OF CARE:  The patient has been admitted to observation. Hematology  consult was requested by the ER. However, the patient will likely need  a GI consultation which can be possibly done as an outpatient especially  after the results of the patient's hepatitis profile are back.    Screening LEIGH titer is being requested by me since this patient has  chronic hep C this might be one of the extraintestinal manifestation of  the patient's hepatitis in the form of a vasculitis picture. This could  also be related to the fact that the patient has significant liver  function derangement in the form of cirrhosis of the liver and resultant  thrombocytopenia as well. Most of this workup can be done as an  outpatient and hence my expectation would be that the patient will  possibly get discharged to home with an outpatient followup that she has  a responsible adult needs to maintain. CODE STATUS:  Full. EXPECTED LENGTH OF STAY:  Less than two midnights based on plan of care  above. DISPOSITION:  Observation.         Eyad Souza MD    D: 04/05/2019 4:50:00       T: 04/05/2019 6:35:36     [email or web address]  Job#: [id number]     Doc#: [id number]    CC:

## 2019-04-05 NOTE — CONSULTS
Pharmacy to dose vancomycin per hospitalist. No previous dose vancomycin given. Start vancomycin 1500mg q12h. Trough 4/6 2330 before 4th dose. Hold dose for trough > 20. ID consult pending.     Indra James PharmD, BCPS

## 2019-04-05 NOTE — PROGRESS NOTES
Hospital Medicine Progress Note     Date:  4/5/2019    PCP: . None (Inactive) (Tel: None)    Date of Admission: 4/4/2019      Brief hospital course: Pt admitted for LE cellulitis, thrombocytopenia, acute hepatitis, she admits she is an IVDU for the past 9 mos. Subjective  States her pain in left LE is worsening. Objective  Physical exam:  Vitals: BP (!) 146/90   Pulse 74   Temp 98.5 °F (36.9 °C) (Oral)   Resp 16   Ht 5' 6\" (1.676 m)   Wt 220 lb (99.8 kg)   SpO2 99%   BMI 35.51 kg/m²   Gen: Not in distress. Alert. Head: Normocephalic. Atraumatic. Eyes: EOMI. Good acuity. ENT: Oral mucosa moist  Neck: No JVD. No obvious thyromegaly. CVS: Nml S1S2, no MRG, RRR  Pulmomary: Clear bilaterally. No crackles. No wheezes. Gastrointestinal: Soft, NT/ND. Positive bowel sounds. Musculoskeletal: No edema. Warm  Neuro: No focal deficit. Moves extremity spontaneously. Psychiatry: Appropriate affect. Not agitated. Skin: petechial rash B/L LE, erythema of left LE with TTP and warmth      24HR INTAKE/OUTPUT:  No intake or output data in the 24 hours ending 04/05/19 1513  No intake/output data recorded. No intake/output data recorded. Meds:    sodium chloride flush  10 mL Intravenous 2 times per day    cefepime  2 g Intravenous Q12H    nicotine  1 patch Transdermal Daily    vancomycin  1,500 mg Intravenous Q12H       Infusions:    sodium chloride           PRN Meds: sodium chloride flush, ondansetron    Labs/imaging:  CBC:   Recent Labs     04/04/19 2111   WBC 5.2   HGB 12.1   PLT 65*         BMP:    Recent Labs     04/04/19 2111      K 3.5      CO2 25   BUN 12   CREATININE 0.6   GLUCOSE 92         Hepatic:   Recent Labs     04/04/19 2111   *   *   BILITOT 1.0   ALKPHOS 248*       Troponin: No results for input(s): TROPONINI in the last 72 hours. BNP: No results for input(s): BNP in the last 72 hours.       INR:   Recent Labs     04/04/19 2111   INR 1.44* Reviewed imaging and reports noted      Assessment:  Active Problems: Thrombocytopenia ([address]    Cellulitis and abscess of lower extremity    Tobacco abuse    IVDU (intravenous drug user)    Hepatitis B    Hepatitis C    Cellulitis  Resolved Problems:    * No resolved hospital problems. *        Plan:  Cellulitis of LEFT LE  - started on IV vanc and cefepime  - ID consulted  - B/L LE dopplers pending      Acute Hepatitis  - HEB B and C +  - GI following, US RUQ pending  - cont supportive care      Thrombocytopenia  - Hem/ONc following, blood smear pending      IVDU  - UDS + for amphetamine, states used heroin last about 1 month ago  - encouraged continued cessation      Tobacco Abuse  - nicoderm, advised to quit        Diet: DIET GENERAL;     Activity: up as tolerated  Prophylaxis: SCD    Code status: Full Code     ----------        Lex Cooks, MD  -------------------------------  [place] hospitalist

## 2019-04-05 NOTE — ED PROVIDER NOTES
eMERGENCY dEPARTMENT eNCOUnter      PtName: Anastasia Johnson  MRN: [id number]  Birthdate 1975  Date of evaluation: 4/4/2019  Provider: Guanakito Wayne, [address] [place] [address]       Chief Complaint   Patient presents with    Rash     to bilat lower legs treated for \"infection\" in right leg last month. no fevers pt states her legs are both swollen \"but not as bad as last time\"          HISTORY OF PRESENT ILLNESS   (Location/Symptom, Timing/Onset,Context/Setting, Quality, Duration, Modifying Factors, Severity) Note limiting factors. HPI    Anastasia Johnson is a 38 y.o. female who presents to the emergency department with chief complaint of a rash over the past week. She was seen here month ago for similar and diagnosed with a cellulitis and placed on doxycycline. She reported that I got better. She was supposed to have an outpatient ultrasound however she never had this performed. She was also supposed to follow-up with primary care however she never called. She complains of bilateral leg swelling and pain aching moderate without radiation. No alleviating or aggravating factors. No fever. No inj. Nursing Notes were reviewed. REVIEW OF SYSTEMS    (2+ forlevel 4; 10+ for level 5)     Review of Systems  See hpi for further details. Complete 10 point review of all systems performed and is otherwise negative unless noted above. PAST MEDICAL HISTORY     Past Medical History:   Diagnosis Date    Arthritis     Asthma        SURGICAL HISTORY     History reviewed. No pertinent surgical history.     CURRENT MEDICATIONS       Previous Medications    No medications on file       ALLERGIES     Darvocet a500 [propoxyphene n-acetaminophen] and Penicillins    FAMILY HISTORY       Family History   Problem Relation Age of Onset    Arthritis Other     Asthma Other     Cancer Other     Diabetes Other     High Blood Pressure Other           SOCIAL HISTORY       Social History     Socioeconomic History    Marital status:      Spouse name: None    Number of children: 3    Years of education: None    Highest education level: None   Occupational History    Occupation: Disabled   Social Needs    Financial resource strain: None    Food insecurity:     Worry: None     Inability: None    Transportation needs:     Medical: None     Non-medical: None   Tobacco Use    Smoking status: Current Every Day Smoker     Types: Cigarettes    Smokeless tobacco: Never Used   Substance and Sexual Activity    Alcohol use: No    Drug use: No    Sexual activity: None   Lifestyle    Physical activity:     Days per week: None     Minutes per session: None    Stress: None   Relationships    Social connections:     Talks on phone: None     Gets together: None     Attends Taoism service: None     Active member of club or organization: None     Attends meetings of clubs or organizations: None     Relationship status: None    Intimate partner violence:     Fear of current or ex partner: None     Emotionally abused: None     Physically abused: None     Forced sexual activity: None   Other Topics Concern    None   Social History Narrative    None       SCREENINGS           PHYSICAL EXAM    (5+ for level 4, 8+ for level 5)     ED Triage Vitals [04/04/19 1918]   BP Temp Temp src Pulse Resp SpO2 Height Weight   (!) 153/92 98.2 °F (36.8 °C) -- 98 14 98 % 5' 6\" (1.676 m) 220 lb (99.8 kg)       Physical Exam   Constitutional: She is oriented to person, place, and time. She appears well-developed and well-nourished. No distress. HENT:   Head: Normocephalic and atraumatic. Right Ear: External ear normal.   Left Ear: External ear normal.   Nose: Nose normal.   Mouth/Throat: Oropharynx is clear and moist. No oropharyngeal exudate. Eyes: Pupils are equal, round, and reactive to light. Conjunctivae and EOM are normal. Right eye exhibits no discharge. Left eye exhibits no discharge. No scleral icterus.    Neck: Normal range of motion. Neck supple. No JVD present. No tracheal deviation present. Cardiovascular: Normal rate, regular rhythm, normal heart sounds and intact distal pulses. Exam reveals no gallop and no friction rub. No murmur heard. Pulmonary/Chest: Effort normal and breath sounds normal. No respiratory distress. She has no wheezes. She has no rales. She exhibits no tenderness. Abdominal: Soft. She exhibits no distension. There is no tenderness. Musculoskeletal: Normal range of motion. She exhibits edema (bl les). She exhibits no tenderness or deformity. Neurological: She is alert and oriented to person, place, and time. No cranial nerve deficit. She exhibits normal muscle tone. Coordination normal.   Skin: Skin is warm and dry. No rash noted. She is not diaphoretic. There is erythema. No pallor. Petechia bilateral lower shins left greater than right. Pictured below. Psychiatric: She has a normal mood and affect. Her behavior is normal. Judgment and thought content normal.           DIAGNOSTIC RESULTS       RADIOLOGY (Per Emergency Physician): Interpretation per the Radiologist below, if available at the time of this note:  No results found.     LABS:  Labs Reviewed   CBC WITH AUTO DIFFERENTIAL - Abnormal; Notable for the following components:       Result Value    RBC 3.58 (*)     Hematocrit 35.7 (*)     RDW 16.2 (*)     Platelets 65 (*)     Anisocytosis Occasional (*)     Macrocytes Occasional (*)     All other components within normal limits    Narrative:     Performed at:  [facility] [address], [place], [facility] [address]   Phone [phone]   COMPREHENSIVE METABOLIC PANEL - Abnormal; Notable for the following components:    Calcium 7.8 (*)     Total Protein 8.3 (*)     Alb 2.7 (*)     Albumin/Globulin Ratio 0.5 (*)     Alkaline Phosphatase 248 (*)      (*)      (*)     All other components within normal limits    Narrative:     Performed at:  [facility]) [facility]  [address] [facility],  [place], [address]   Phone [phone]   PROTIME-INR - Abnormal; Notable for the following components:    Protime 16.4 (*)     INR 1.44 (*)     All other components within normal limits    Narrative:     Performed at:  [facility]  [address] [facility],  [place], [address]   Phone [phone]   APTT - Abnormal; Notable for the following components:    aPTT 40.3 (*)     All other components within normal limits    Narrative:     Performed at:  [facility]  [address] [facility],  [place], [address]   Phone [phone]   FIBRINOGEN - Abnormal; Notable for the following components:    Fibrinogen 177 (*)     All other components within normal limits    Narrative:     Performed at:  [facility]  [address] [facility],  [place], [address] [address]   Phone [phone]   LACTATE DEHYDROGENASE   HEPATITIS PANEL, ACUTE   HEPATITIS PANEL, ACUTE       All other labs were within normal range or not returned as of this dictation. EMERGENCY DEPARTMENT COURSE and DIFFERENTIAL DIAGNOSIS/MDM:   Vitals:    Vitals:    04/04/19 1918   BP: (!) 153/92   Pulse: 98   Resp: 14   Temp: 98.2 °F (36.8 °C)   SpO2: 98%   Weight: 220 lb (99.8 kg)   Height: 5' 6\" (1.676 m)       Medications   doxycycline hyclate (VIBRAMYCIN) capsule 100 mg (100 mg Oral Given 4/4/19 2107)       MDM. Basic blood work coag's ordered. Patient with elevated INR, PTT, stable hemoglobin, stable vitals, low platelets. Case discussed with hematology-Dr. Onel Rincon. Requested fibrinogen, haptoglobin, LDH which he will follow-up on. These were ordered. Patient admitted to hospitalist.  Patient denies history of alcoholism or abdominal pain. No new medications. There is no mucosal involvement.     CONSULTS:  IP CONSULT TO HOSPITALIST    PROCEDURES:  Unless otherwise noted below, none     Procedures    FINAL IMPRESSION      1.

## 2019-04-05 NOTE — CONSULTS
person. Skin: Warm, dry, normal turgor, no rash    DATA:  CBC:   Lab Results   Component Value Date    WBC 5.2 04/04/2019    RBC 3.58 04/04/2019    HGB 12.1 04/04/2019    HCT 35.7 04/04/2019    MCV 99.7 04/04/2019    MCH 33.8 04/04/2019    MCHC 34.0 04/04/2019    RDW 16.2 04/04/2019    PLT 65 04/04/2019    MPV 10.3 04/04/2019     BMP:  Lab Results   Component Value Date     04/04/2019    K 3.5 04/04/2019     04/04/2019    CO2 25 04/04/2019    BUN 12 04/04/2019    CREATININE 0.6 04/04/2019    CALCIUM 7.8 04/04/2019    GFRAA >60 04/04/2019    LABGLOM >60 04/04/2019    GLUCOSE 92 04/04/2019     Magnesium: No results found for: MG  LIVER PROFILE:   Recent Labs     04/04/19  2111   *   *   BILITOT 1.0   ALKPHOS 248*     PT/INR:    Lab Results   Component Value Date    PROTIME 16.4 04/04/2019    PROTIME 15.7 03/06/2019    INR 1.44 04/04/2019    INR 1.38 03/06/2019       IMPRESSION/RECOMMENDATIONS:    Active Problems: Thrombocytopenia ([address]  Resolved Problems:    * No resolved hospital problems. *       TCP  - plts 65 today without signs of active bleeding  - check ANCA, SPEP, QIG, immunofixation profile, cryoglobulin  - monitor plt count. Elevated liver enzymes  - possible hepatitis  - Consult GI     Lower extremity rash/vasculitis  - consult ID for input    This plan was discussed with the patient and he/she verbalized understanding. Thank you for allowing us to participate in the care of this patient. Angie Lemon CNP  [facility]    Patient appears to have hepatitis when seen this morning type was not confirmed.  Will check studies as above to determine possble cause of skin changes lower extremities

## 2019-04-06 ENCOUNTER — APPOINTMENT (OUTPATIENT)
Dept: ULTRASOUND IMAGING | Age: 44
DRG: 442 | End: 2019-04-06
Payer: MEDICARE

## 2019-04-06 PROBLEM — B16.9 ACUTE HEPATITIS B: Status: ACTIVE | Noted: 2019-04-05

## 2019-04-06 LAB
ALBUMIN SERPL-MCNC: 2.5 G/DL (ref 3.4–5)
ALP BLD-CCNC: 235 U/L (ref 40–129)
ALT SERPL-CCNC: 116 U/L (ref 10–40)
ANION GAP SERPL CALCULATED.3IONS-SCNC: 8 MMOL/L (ref 3–16)
AST SERPL-CCNC: 200 U/L (ref 15–37)
BILIRUB SERPL-MCNC: 1.2 MG/DL (ref 0–1)
BILIRUBIN DIRECT: 0.5 MG/DL (ref 0–0.3)
BILIRUBIN, INDIRECT: 0.7 MG/DL (ref 0–1)
BUN BLDV-MCNC: 9 MG/DL (ref 7–20)
CALCIUM SERPL-MCNC: 7.9 MG/DL (ref 8.3–10.6)
CHLORIDE BLD-SCNC: 105 MMOL/L (ref 99–110)
CO2: 25 MMOL/L (ref 21–32)
CREAT SERPL-MCNC: 0.6 MG/DL (ref 0.6–1.1)
GFR AFRICAN AMERICAN: >60
GFR NON-AFRICAN AMERICAN: >60
GLUCOSE BLD-MCNC: 87 MG/DL (ref 70–99)
HCT VFR BLD CALC: 36.2 % (ref 36–48)
HEMOGLOBIN: 12.1 G/DL (ref 12–16)
INR BLD: 1.51 (ref 0.86–1.14)
MCH RBC QN AUTO: 33.7 PG (ref 26–34)
MCHC RBC AUTO-ENTMCNC: 33.3 G/DL (ref 31–36)
MCV RBC AUTO: 101.2 FL (ref 80–100)
MRSA SCREEN RT-PCR: NORMAL
PDW BLD-RTO: 16.5 % (ref 12.4–15.4)
PLATELET # BLD: 64 K/UL (ref 135–450)
PMV BLD AUTO: 10.4 FL (ref 5–10.5)
POTASSIUM SERPL-SCNC: 4.2 MMOL/L (ref 3.5–5.1)
PROTHROMBIN TIME: 17.2 SEC (ref 9.8–13)
RBC # BLD: 3.58 M/UL (ref 4–5.2)
SODIUM BLD-SCNC: 138 MMOL/L (ref 136–145)
TOTAL PROTEIN: 8.2 G/DL (ref 6.4–8.2)
WBC # BLD: 4.1 K/UL (ref 4–11)

## 2019-04-06 PROCEDURE — 85230 CLOT FACTOR VII PROCONVERTIN: CPT

## 2019-04-06 PROCEDURE — 80048 BASIC METABOLIC PNL TOTAL CA: CPT

## 2019-04-06 PROCEDURE — 1200000000 HC SEMI PRIVATE

## 2019-04-06 PROCEDURE — 85730 THROMBOPLASTIN TIME PARTIAL: CPT

## 2019-04-06 PROCEDURE — 2580000003 HC RX 258: Performed by: INTERNAL MEDICINE

## 2019-04-06 PROCEDURE — 36415 COLL VENOUS BLD VENIPUNCTURE: CPT

## 2019-04-06 PROCEDURE — 85613 RUSSELL VIPER VENOM DILUTED: CPT

## 2019-04-06 PROCEDURE — 2580000003 HC RX 258: Performed by: FAMILY MEDICINE

## 2019-04-06 PROCEDURE — 85260 CLOT FACTOR X STUART-POWER: CPT

## 2019-04-06 PROCEDURE — 80076 HEPATIC FUNCTION PANEL: CPT

## 2019-04-06 PROCEDURE — 76705 ECHO EXAM OF ABDOMEN: CPT

## 2019-04-06 PROCEDURE — 85610 PROTHROMBIN TIME: CPT

## 2019-04-06 PROCEDURE — 85670 THROMBIN TIME PLASMA: CPT

## 2019-04-06 PROCEDURE — 6360000002 HC RX W HCPCS: Performed by: INTERNAL MEDICINE

## 2019-04-06 PROCEDURE — 6360000002 HC RX W HCPCS: Performed by: NURSE PRACTITIONER

## 2019-04-06 PROCEDURE — 85027 COMPLETE CBC AUTOMATED: CPT

## 2019-04-06 PROCEDURE — 85635 REPTILASE TEST: CPT

## 2019-04-06 RX ORDER — KETOROLAC TROMETHAMINE 15 MG/ML
15 INJECTION, SOLUTION INTRAMUSCULAR; INTRAVENOUS ONCE
Status: COMPLETED | OUTPATIENT
Start: 2019-04-06 | End: 2019-04-06

## 2019-04-06 RX ORDER — MORPHINE SULFATE 4 MG/ML
4 INJECTION, SOLUTION INTRAMUSCULAR; INTRAVENOUS ONCE
Status: COMPLETED | OUTPATIENT
Start: 2019-04-06 | End: 2019-04-06

## 2019-04-06 RX ADMIN — MORPHINE SULFATE 4 MG: 4 INJECTION INTRAVENOUS at 00:33

## 2019-04-06 RX ADMIN — SODIUM CHLORIDE: 9 INJECTION, SOLUTION INTRAVENOUS at 10:06

## 2019-04-06 RX ADMIN — Medication 10 ML: at 10:06

## 2019-04-06 RX ADMIN — Medication 10 ML: at 21:10

## 2019-04-06 RX ADMIN — KETOROLAC TROMETHAMINE 15 MG: 15 INJECTION, SOLUTION INTRAMUSCULAR; INTRAVENOUS at 21:10

## 2019-04-06 RX ADMIN — SODIUM CHLORIDE: 9 INJECTION, SOLUTION INTRAVENOUS at 23:36

## 2019-04-06 ASSESSMENT — ENCOUNTER SYMPTOMS
COLOR CHANGE: 0
CHEST TIGHTNESS: 0
RHINORRHEA: 0
NAUSEA: 0
CHOKING: 0
APNEA: 0
TROUBLE SWALLOWING: 0
DIARRHEA: 0
COUGH: 0
STRIDOR: 0
FACIAL SWELLING: 0
EYE DISCHARGE: 0
PHOTOPHOBIA: 0
EYE REDNESS: 0
ABDOMINAL PAIN: 0
BLOOD IN STOOL: 0
SHORTNESS OF BREATH: 0

## 2019-04-06 ASSESSMENT — PAIN SCALES - GENERAL
PAINLEVEL_OUTOF10: 6
PAINLEVEL_OUTOF10: 7

## 2019-04-06 NOTE — PROGRESS NOTES
Pt complaining of aching pain 8/10 in BLE. States she took gabapentin 800 mg up until 1 year ago and is requesting it to help with the pain. Pt also requesting Trazadone to help sleep. Pt states she's taken 150 mg in the past (up until 1 year ago). Pt also requesting to speak to someone regarding her code status. Pt's BP sustaining in 140s/90s. Dr. Chapin Brooks paged and aware. No new orders at this time. Will continue to monitor.

## 2019-04-06 NOTE — PROGRESS NOTES
[facility] GI  Gastroenterology Progress Note    Venice Allan is a 38 y.o. female patient. 1. Thrombocytopenia (HCC)    2. Petechiae    3. Leg edema - bilateral    4. Transaminitis        SUBJECTIVE:    Left leg redness and pain better, no ulcers   Reportedly last use IV heroine 4 weeks ago but UDS positive for amphetamines     ROS:  Cardiovascular ROS: no chest pain or dyspnea on exertion  Gastrointestinal ROS: see above  Respiratory ROS: no cough, shortness of breath, or wheezing    Physical    VITALS:  /75   Pulse 79   Temp 98.1 °F (36.7 °C) (Oral)   Resp 16   Ht 5' 6\" (1.676 m)   Wt 220 lb (99.8 kg)   SpO2 98%   BMI 35.51 kg/m²   TEMPERATURE:  Current - Temp: 98.1 °F (36.7 °C); Max - Temp  Av.3 °F (36.8 °C)  Min: 98 °F (36.7 °C)  Max: 98.5 °F (36.9 °C)    NAD  RRR, Nl s1s2  Lungs CTA Bilaterally, normal effort  Abdomen soft, ND, NT, no HSM, Bowel sounds normal, small ventral hernia  AAOx3, No asterixis   L leg erythema and tender to touch, no ulcers     Data      CBC:   Recent Labs     19  1635 19  0708   WBC 5.2 4.9 4.1   RBC 3.58* 4.12 3.58*   HGB 12.1 13.8 12.1   HCT 35.7* 41.1 36.2   PLT 65* 71* 64*   MCV 99.7 99.8 101.2*   MCH 33.8 33.5 33.7   MCHC 34.0 33.6 33.3   RDW 16.2* 16.6* 16.5*        BMP:  Recent Labs     19  1635 19  0708    135* 138   K 3.5 3.9 4.2    101 105   CO2 25 27 25   BUN 12 9 9   CREATININE 0.6 0.6 0.6   CALCIUM 7.8* 8.4 7.9*   GLUCOSE 92 79 87        Hepatic Function Panel:   Lab Results   Component Value Date    ALKPHOS 235 2019     2019     2019    PROT 8.2 2019    BILITOT 1.2 2019    BILIDIR 0.5 2019    IBILI 0.7 2019    LABALBU 2.5 2019       No results for input(s): LIPASE, AMYLASE in the last 72 hours.   Recent Labs     19  2111 19  1635 19  0708   PROTIME 16.4* 15.6* 17.2*   INR 1.44* 1.37* 1.51*     No results for

## 2019-04-06 NOTE — PROGRESS NOTES
ONCOLOGY HEMATOLOGY CARE  PROGRESS NOTE    SUBJECTIVE:       No new complaints. She only has rash on legs, no epistaxis, hemoptysis, hematuria, melena, BRBPR. Seen by GI, evaluation underway      PHYSICAL EXAM:       BP (!) 145/85   Pulse 72   Temp 98.1 °F (36.7 °C) (Oral)   Resp 16   Ht 5' 6\" (1.676 m)   Wt 220 lb (99.8 kg)   SpO2 100%   BMI 35.51 kg/m²     General appearance: Alert and cooperative. Appears stated age. Comfortable. Head: Normocephalic, without obvious abnormality, atraumatic  EENT:  No icterus. No mucositis. Abdomen: Soft, non-tender; bowel sounds normal; no masses,  No hepatosplenomegaly, distention. Extremities: 2+ edema of both calves, with erythematous rash on both legs, NOT petechiae. Skin: No jaundice, see above. Musculoskeletal:  No joint swelling, deformities, tenderness, erythema. Neuro:  Alert and oriented. Speech is normal.  Gait is normal.  Cranial nerves are intact. Our conversation was appropriate.       MEDS:     Current Facility-Administered Medications   Medication Dose Route Frequency Provider Last Rate Last Dose    sodium chloride flush 0.9 % injection 10 mL  10 mL Intravenous 2 times per day Sanford Cedeno MD   10 mL at 04/06/19 1006    sodium chloride flush 0.9 % injection 10 mL  10 mL Intravenous PRN Sanford Cedeno MD        ondansetron (ZOFRAN) injection 4 mg  4 mg Intravenous Q6H PRN Sanford Cedeno MD        nicotine (NICODERM CQ) 21 MG/24HR 1 patch  1 patch Transdermal Daily Zaina Bates MD   1 patch at 04/05/19 1216    0.9 % sodium chloride infusion   Intravenous Continuous Zaina Bates MD 75 mL/hr at 04/06/19 1006         LABS:     CBC:   Lab Results   Component Value Date    WBC 4.1 04/06/2019    HGB 12.1 04/06/2019    HCT 36.2 04/06/2019    .2 (H) 04/06/2019    PLT 64 (L) 04/06/2019    LYMPHOPCT 49.6 04/04/2019    RBC 3.58 (L) 04/06/2019    MCH 33.7 04/06/2019    MCHC 33.3 04/06/2019    RDW 16.5 (H) 04/06/2019    NEUTOPHILPCT 35.1 04/04/2019    MONOPCT 10.6 04/04/2019    BASOPCT 1.0 04/04/2019    NEUTROABS 1.8 04/04/2019    LYMPHSABS 2.6 04/04/2019    MONOSABS 0.6 04/04/2019    EOSABS 0.2 04/04/2019    BASOSABS 0.1 04/04/2019       CMP:   Lab Results   Component Value Date     04/06/2019    K 4.2 04/06/2019     04/06/2019    CO2 25 04/06/2019    BUN 9 04/06/2019    CREATININE 0.6 04/06/2019    GLUCOSE 87 04/06/2019    CALCIUM 7.9 04/06/2019    PROT 8.2 04/06/2019    LABALBU 2.5 04/06/2019    BILITOT 1.2 04/06/2019    ALKPHOS 235 04/06/2019     04/06/2019     04/06/2019      Results for Nicolas Vasquez (MRN [id number]) as of 4/6/2019 10:25   Ref. Range 4/4/2019 21:11 4/5/2019 16:35 4/6/2019 07:08   Prothrombin Time Latest Ref Range: 9.8 - 13.0 sec 16.4 (H) 15.6 (H) 17.2 (H)   INR Latest Ref Range: 0.86 - 1.14  1.44 (H) 1.37 (H) 1.51 (H)   Fibrinogen Latest Ref Range: 200 - 397 mg/dL 177 (L)     aPTT Latest Ref Range: 26.0 - 36.0 sec 40.3 (H)       Amphetamine screen + 4/5/2019  Results for Nicolas Vasquez (MRN [id number]) as of 4/6/2019 10:25   Ref. Range 4/4/2019 23:45 4/5/2019 06:08 4/5/2019 11:50 4/5/2019 16:35   LEIGH Latest Ref Range: Negative   Negative     IgA Latest Ref Range: 70.0 - 400.0 mg/dL   880.0 (H)    Total IgG Latest Ref Range: 700.0 - 1600.0 mg/dL   3626.0 (H)    IgM Latest Ref Range: 40.0 - 230.0 mg/dL   514.0 (H)    Hep A IgM Latest Ref Range: Non-reactive  Non-reactive      Hep B S Ab Latest Units: mIU/mL    <3.50   Hep B S Ag Interp Latest Ref Range: Non-reactive  Reactive (A)      Hep C Ab Interp Latest Ref Range: Non-reactive  REACTIVE (A)      Hep B Core Ab, IgM Latest Ref Range: Non-reactive  REACTIVE (A)        Results for Nicolas Vasquez (MRN [id number]) as of 4/6/2019 10:25   Ref.  Range 4/5/2019 10:43 4/5/2019 10:47 4/5/2019 11:45 4/5/2019 12:25   HIV-1 Antibody Latest Ref Range: Non-reactive  Non-Reactive      HIV Ag/Ab Latest Ref Range: Non-reactive  Non-Reactive      HIV ANTIGEN Latest Ref Range: Non-reactive  Non-Reactive      HIV-2 Ab Latest Ref Range: Non-reactive  Non-Reactive        Hep B DNA and HCV RNA pending. 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 PLAN:       Ordered coag studies, lupus anticoagulant test.  Pts with Hep c may have antibodies.     Williamson Peaks

## 2019-04-06 NOTE — PROGRESS NOTES
Hospital Medicine Progress Note     Date:  4/6/2019    PCP: . None (Inactive) (Tel: None)    Date of Admission: 4/4/2019      Brief hospital course: Pt admitted for LE cellulitis, thrombocytopenia, acute hepatitis, she admits she is an IVDU for the past 9 mos. Subjective  No new complaints    Objective  Physical exam:  Vitals: BP (!) 166/92   Pulse 77   Temp 98.2 °F (36.8 °C) (Oral)   Resp 16   Ht 5' 6\" (1.676 m)   Wt 220 lb (99.8 kg)   SpO2 99%   BMI 35.51 kg/m²   Gen: Not in distress. Alert. Head: Normocephalic. Atraumatic. Eyes: EOMI. Good acuity. ENT: Oral mucosa moist  Neck: No JVD. No obvious thyromegaly. CVS: Nml S1S2, no MRG, RRR  Pulmomary: Clear bilaterally. No crackles. No wheezes. Gastrointestinal: Soft, NT/ND. Positive bowel sounds. Musculoskeletal: No edema. Warm  Neuro: No focal deficit. Moves extremity spontaneously. Psychiatry: Appropriate affect. Not agitated. Skin: petechial rash B/L LE, erythema of left LE with TTP and warmth      24HR INTAKE/OUTPUT:      Intake/Output Summary (Last 24 hours) at 4/6/2019 1340  Last data filed at 4/6/2019 1009  Gross per 24 hour   Intake 1616 ml   Output --   Net 1616 ml     I/O last 3 completed shifts: In: 368 [I.V.:368]  Out: -   I/O this shift:  In: 6513 [P.O.:240;  I.V.:1008]  Out: -       Meds:    sodium chloride flush  10 mL Intravenous 2 times per day    nicotine  1 patch Transdermal Daily       Infusions:    sodium chloride 75 mL/hr at 04/06/19 1006         PRN Meds: sodium chloride flush, ondansetron    Labs/imaging:  CBC:   Recent Labs     04/04/19 2111 04/05/19 1635 04/06/19  0708   WBC 5.2 4.9 4.1   HGB 12.1 13.8 12.1   PLT 65* 71* 64*         BMP:    Recent Labs     04/04/19 2111 04/05/19  1635 04/06/19  0708    135* 138   K 3.5 3.9 4.2    101 105   CO2 25 27 25   BUN 12 9 9   CREATININE 0.6 0.6 0.6   GLUCOSE 92 79 87         Hepatic:   Recent Labs     04/04/19  2111 04/05/19  1635 04/06/19  0708   AST

## 2019-04-06 NOTE — PROGRESS NOTES
Team paged again regarding pt pain. One time dose of Morphine ordered for pain (Dr. Sheeba Nelson). Will continue to monitor.

## 2019-04-07 LAB
ALBUMIN SERPL-MCNC: 2.4 G/DL (ref 3.4–5)
ALP BLD-CCNC: 251 U/L (ref 40–129)
ALT SERPL-CCNC: 116 U/L (ref 10–40)
ANCA IFA: NORMAL
ANION GAP SERPL CALCULATED.3IONS-SCNC: 7 MMOL/L (ref 3–16)
AST SERPL-CCNC: 214 U/L (ref 15–37)
BILIRUB SERPL-MCNC: 0.9 MG/DL (ref 0–1)
BILIRUBIN DIRECT: 0.4 MG/DL (ref 0–0.3)
BILIRUBIN, INDIRECT: 0.5 MG/DL (ref 0–1)
BUN BLDV-MCNC: 11 MG/DL (ref 7–20)
CALCIUM SERPL-MCNC: 7.7 MG/DL (ref 8.3–10.6)
CHLORIDE BLD-SCNC: 109 MMOL/L (ref 99–110)
CO2: 23 MMOL/L (ref 21–32)
CREAT SERPL-MCNC: 0.6 MG/DL (ref 0.6–1.1)
GFR AFRICAN AMERICAN: >60
GFR NON-AFRICAN AMERICAN: >60
GLUCOSE BLD-MCNC: 120 MG/DL (ref 70–99)
HCT VFR BLD CALC: 36 % (ref 36–48)
HEMOGLOBIN: 11.9 G/DL (ref 12–16)
HEPATITIS B CORE TOTAL ANTIBODY: POSITIVE
INR BLD: 1.42 (ref 0.86–1.14)
MCH RBC QN AUTO: 33.2 PG (ref 26–34)
MCHC RBC AUTO-ENTMCNC: 33.1 G/DL (ref 31–36)
MCV RBC AUTO: 100.1 FL (ref 80–100)
PDW BLD-RTO: 16.7 % (ref 12.4–15.4)
PLATELET # BLD: 65 K/UL (ref 135–450)
PMV BLD AUTO: 10.1 FL (ref 5–10.5)
POTASSIUM SERPL-SCNC: 3.9 MMOL/L (ref 3.5–5.1)
PROTHROMBIN TIME: 16.2 SEC (ref 9.8–13)
RBC # BLD: 3.59 M/UL (ref 4–5.2)
SODIUM BLD-SCNC: 139 MMOL/L (ref 136–145)
TOTAL PROTEIN: 8 G/DL (ref 6.4–8.2)
WBC # BLD: 3.6 K/UL (ref 4–11)

## 2019-04-07 PROCEDURE — 85610 PROTHROMBIN TIME: CPT

## 2019-04-07 PROCEDURE — 99232 SBSQ HOSP IP/OBS MODERATE 35: CPT | Performed by: INTERNAL MEDICINE

## 2019-04-07 PROCEDURE — 2580000003 HC RX 258: Performed by: INTERNAL MEDICINE

## 2019-04-07 PROCEDURE — 1200000000 HC SEMI PRIVATE

## 2019-04-07 PROCEDURE — 2580000003 HC RX 258: Performed by: FAMILY MEDICINE

## 2019-04-07 PROCEDURE — 6370000000 HC RX 637 (ALT 250 FOR IP): Performed by: INTERNAL MEDICINE

## 2019-04-07 PROCEDURE — 80048 BASIC METABOLIC PNL TOTAL CA: CPT

## 2019-04-07 PROCEDURE — 80076 HEPATIC FUNCTION PANEL: CPT

## 2019-04-07 PROCEDURE — 85027 COMPLETE CBC AUTOMATED: CPT

## 2019-04-07 PROCEDURE — 36415 COLL VENOUS BLD VENIPUNCTURE: CPT

## 2019-04-07 RX ORDER — OXYCODONE HYDROCHLORIDE 5 MG/1
5 TABLET ORAL EVERY 4 HOURS PRN
Status: DISCONTINUED | OUTPATIENT
Start: 2019-04-07 | End: 2019-04-09 | Stop reason: HOSPADM

## 2019-04-07 RX ADMIN — Medication 10 ML: at 09:49

## 2019-04-07 RX ADMIN — SODIUM CHLORIDE: 9 INJECTION, SOLUTION INTRAVENOUS at 17:09

## 2019-04-07 RX ADMIN — OXYCODONE HYDROCHLORIDE 5 MG: 5 TABLET ORAL at 19:09

## 2019-04-07 RX ADMIN — Medication 10 ML: at 21:38

## 2019-04-07 ASSESSMENT — PAIN SCALES - GENERAL: PAINLEVEL_OUTOF10: 6

## 2019-04-07 NOTE — PROGRESS NOTES
Hospital Medicine Progress Note     Date:  4/7/2019    PCP: . None (Inactive) (Tel: None)    Date of Admission: 4/4/2019      Brief hospital course: Pt admitted for LE cellulitis, thrombocytopenia, acute hepatitis, she admits she is an IVDU for the past 9 mos. Subjective  No new complaints    Objective  Physical exam:  Vitals: BP (!) 144/91   Pulse 68   Temp 97.8 °F (36.6 °C) (Oral)   Resp 16   Ht 5' 6\" (1.676 m)   Wt 220 lb (99.8 kg)   SpO2 98%   BMI 35.51 kg/m²   Gen: Not in distress. Alert. Head: Normocephalic. Atraumatic. Eyes: EOMI. Good acuity. ENT: Oral mucosa moist  Neck: No JVD. No obvious thyromegaly. CVS: Nml S1S2, no MRG, RRR  Pulmomary: Clear bilaterally. No crackles. No wheezes. Gastrointestinal: Soft, NT/ND. Positive bowel sounds. Musculoskeletal: No edema. Warm  Neuro: No focal deficit. Moves extremity spontaneously. Psychiatry: Appropriate affect. Not agitated. Skin: petechial rash B/L LE, erythema of left LE with TTP and warmth      24HR INTAKE/OUTPUT:    No intake or output data in the 24 hours ending 04/07/19 1533  No intake/output data recorded. No intake/output data recorded.       Meds:    sodium chloride flush  10 mL Intravenous 2 times per day    nicotine  1 patch Transdermal Daily       Infusions:    sodium chloride 75 mL/hr at 04/06/19 2336         PRN Meds: sodium chloride flush, ondansetron    Labs/imaging:  CBC:   Recent Labs     04/05/19  1635 04/06/19  0708 04/07/19  0702   WBC 4.9 4.1 3.6*   HGB 13.8 12.1 11.9*   PLT 71* 64* 65*         BMP:    Recent Labs     04/05/19  1635 04/06/19  0708 04/07/19  0702   * 138 139   K 3.9 4.2 3.9    105 109   CO2 27 25 23   BUN 9 9 11   CREATININE 0.6 0.6 0.6   GLUCOSE 79 87 120*         Hepatic:   Recent Labs     04/05/19  1635 04/06/19  0708 04/07/19  0702   * 200* 214*   * 116* 116*   BILITOT 1.4* 1.2* 0.9   ALKPHOS 317* 235* 251*       Troponin: No results for input(s): TROPONINI in the last 72 hours. BNP: No results for input(s): BNP in the last 72 hours. INR:   Recent Labs     04/05/19  1635 04/06/19  0708 04/07/19  0703   INR 1.37* 1.51* 1.42*           Reviewed imaging and reports noted      Assessment:  Active Problems: Thrombocytopenia ([address]    Cellulitis and abscess of lower extremity    Tobacco abuse    IVDU (intravenous drug user)    Acute hepatitis B    Hepatitis C    Cellulitis    Petechiae    Leg edema    Transaminitis    Vasculitis of skin    History of asthma    Pulmonary hypertension (HCC)    Class 2 obesity due to excess calories with body mass index (BMI) of 35.0 to 35.9 in adult  Resolved Problems:    * No resolved hospital problems. *        Plan:  Cellulitis of LEFT LE - suspected initially but now felt 2/2 vasculitis  - ID following  - B/L LE dopplers pending      Acute Hepatitis  - HEB B and C +  - GI following, US RUQ noted  - cont supportive care      Thrombocytopenia  - Hem/ONc following, blood smear pending      IVDU  - UDS + for amphetamine, states used heroin last about 1 month ago  - encouraged continued cessation      Tobacco Abuse  - nicoderm, advised to quit        Diet: DIET GENERAL;     Activity: up as tolerated  Prophylaxis: SCD    Code status: Full Code     ----------        Rolf Boyce MD  -------------------------------  [place] hospitalist

## 2019-04-07 NOTE — PROGRESS NOTES
Infectious Disease Follow up Notes    CC :  IVDU, rash      Antibiotics:   None     Admit Date:   4/4/2019  Hospital Day: 4    Subjective:   She remains afebrile   She says she feels fine, no abd pain, N/V/D. No pain in legs. Rash is fading     Objective:     Patient Vitals for the past 8 hrs:   BP Temp Temp src Pulse Resp SpO2   04/07/19 1209 (!) 144/91 97.8 °F (36.6 °C) Oral 68 16 98 %   04/07/19 0947 132/74 97.8 °F (36.6 °C) Oral 72 16 99 %       EXAM:  General:  Alert, oriented, NAD    HEENT:  NCAT, sclera anicteric  MMM, no thrush  NECK:  supple       ABD:  Soft, flat, +BS, NT  EXT:  Erythematous rash mo LE, L>R.   No calor          LINE: PIV site ok         Scheduled Meds:   sodium chloride flush  10 mL Intravenous 2 times per day    nicotine  1 patch Transdermal Daily       Continuous Infusions:   sodium chloride 75 mL/hr at 04/06/19 4026          Data Review:    Lab Results   Component Value Date    WBC 3.6 (L) 04/07/2019    HGB 11.9 (L) 04/07/2019    HCT 36.0 04/07/2019    .1 (H) 04/07/2019    PLT 65 (L) 04/07/2019     Lab Results   Component Value Date    CREATININE 0.6 04/07/2019    BUN 11 04/07/2019     04/07/2019    K 3.9 04/07/2019     04/07/2019    CO2 23 04/07/2019       Hepatic Function Panel:   Lab Results   Component Value Date    ALKPHOS 251 04/07/2019     04/07/2019     04/07/2019    PROT 8.0 04/07/2019    BILITOT 0.9 04/07/2019    BILIDIR 0.4 04/07/2019    IBILI 0.5 04/07/2019    LABALBU 2.4 04/07/2019         Assessment:     Patient Active Problem List    Diagnosis Date Noted    Petechiae     Leg edema     Transaminitis     Vasculitis of skin     History of asthma     Pulmonary hypertension (HCC)     Class 2 obesity due to excess calories with body mass index (BMI) of 35.0 to 35.9 in adult     Thrombocytopenia (HCC) 04/05/2019    Cellulitis and abscess of lower

## 2019-04-07 NOTE — PROGRESS NOTES
[facility] GI  Gastroenterology Progress Note    Arturo Mora is a 38 y.o. female patient. 1. Thrombocytopenia (HCC)    2. Petechiae    3. Leg edema - bilateral    4. Transaminitis        SUBJECTIVE:    LE erythema has improved  LE pain is gone   No LE ulcers   Appetite ok  No abdominal pain, n/v, bowel changes     ROS:  Cardiovascular ROS: no chest pain or dyspnea on exertion  Gastrointestinal ROS: see above  Respiratory ROS: no cough, shortness of breath, or wheezing    Physical    VITALS:  /70   Pulse 72   Temp 98 °F (36.7 °C) (Oral)   Resp 16   Ht 5' 6\" (1.676 m)   Wt 220 lb (99.8 kg)   SpO2 98%   BMI 35.51 kg/m²   TEMPERATURE:  Current - Temp: 98 °F (36.7 °C); Max - Temp  Av.1 °F (36.7 °C)  Min: 98 °F (36.7 °C)  Max: 98.3 °F (36.8 °C)    NAD  RRR, Nl s1s2  Lungs CTA Bilaterally, normal effort  Abdomen soft, ND, NT, no HSM, Bowel sounds normal, small ventral hernia  AAOx3, No asterixis   L leg erythema and tender to touch, no ulcers         Data      CBC:   Recent Labs     19  1635 19  0708 19  0702   WBC 4.9 4.1 3.6*   RBC 4.12 3.58* 3.59*   HGB 13.8 12.1 11.9*   HCT 41.1 36.2 36.0   PLT 71* 64* 65*   MCV 99.8 101.2* 100.1*   MCH 33.5 33.7 33.2   MCHC 33.6 33.3 33.1   RDW 16.6* 16.5* 16.7*        BMP:  Recent Labs     19  1635 19  0708 19  0702   * 138 139   K 3.9 4.2 3.9    105 109   CO2 27 25 23   BUN 9 9 11   CREATININE 0.6 0.6 0.6   CALCIUM 8.4 7.9* 7.7*   GLUCOSE 79 87 120*        Hepatic Function Panel:   Lab Results   Component Value Date    ALKPHOS 251 2019     2019     2019    PROT 8.0 2019    BILITOT 0.9 2019    BILIDIR 0.4 2019    IBILI 0.5 2019    LABALBU 2.4 2019       No results for input(s): LIPASE, AMYLASE in the last 72 hours.   Recent Labs     19  1635 19  0708 19  0703   PROTIME 15.6* 17.2* 16.2*   INR 1.37* 1.51* 1.42*     No results for input(s): PTT in the last 72 hours. No results for input(s): OCCULTBLD in the last 72 hours. RUQ US 4/6  Mild dilation of the common bile duct with questionable debris.  Please   correlate with patient's bilirubin level and liver function test.  MRCP could   be useful to further characterize for possible debris, choledocholithiasis.              Assessment:      Active Problems:    Thrombocytopenia ([address]  Resolved Problems:    * No resolved hospital problems. *     Hepatitis - , . Alk phos 248 with normal bili. transaminases in the 50-60's in 2016 per labs in [facility]. No prior dx of liver disease. 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 Petechial rash - concerning for vasculitis.  Cryoglobulin sent per heme.   IVDU - heroine and methamphetamine   Asymptomatic ventral hernia   RUQ with dilated CBD with debris. Remote cholecystectomy. Possible CBD stones.     Recommendations:   - order MRCP  - await Hep B DNA and HCV RNA.   - f/u cryoglobulin per heme  - follow liver enzymes, INR  - heme following and ID consulted as well  - drug cessation.  SW consult for rehab programs.        Anjum Rodriguez MD, MSc  [address]

## 2019-04-07 NOTE — PROGRESS NOTES
ONCOLOGY HEMATOLOGY CARE  PROGRESS NOTE    SUBJECTIVE:       Complaining of abdominal cramping. She only has rash on legs, no epistaxis, hemoptysis, hematuria, melena, BRBPR. Seen by GI, evaluation underway      PHYSICAL EXAM:       BP (!) 144/91   Pulse 68   Temp 97.8 °F (36.6 °C) (Oral)   Resp 16   Ht 5' 6\" (1.676 m)   Wt 220 lb (99.8 kg)   SpO2 98%   BMI 35.51 kg/m²     General appearance: Alert and cooperative. Appears stated age. Comfortable. Head: Normocephalic, without obvious abnormality, atraumatic  EENT:  No icterus. No mucositis. Abdomen: Soft, non-tender; bowel sounds normal; no masses,  No hepatosplenomegaly, distention. Extremities: 2+ edema of both calves, with erythematous rash on both legs but looks less so today than yesterday, NOT petechiae. Skin: No jaundice, see above. Musculoskeletal:  No joint swelling, deformities, tenderness, erythema. Neuro:  Alert and oriented. Speech is normal.  Gait is normal.  Cranial nerves are intact. Our conversation was appropriate.       MEDS:     Current Facility-Administered Medications   Medication Dose Route Frequency Provider Last Rate Last Dose    sodium chloride flush 0.9 % injection 10 mL  10 mL Intravenous 2 times per day Sanford Cedeno MD   10 mL at 04/07/19 0949    sodium chloride flush 0.9 % injection 10 mL  10 mL Intravenous PRN Sanford Cedeno MD        ondansetron (ZOFRAN) injection 4 mg  4 mg Intravenous Q6H PRN Sanford Cedeno MD        nicotine (NICODERM CQ) 21 MG/24HR 1 patch  1 patch Transdermal Daily Dalia Peoples MD   1 patch at 04/05/19 1216    0.9 % sodium chloride infusion   Intravenous Continuous Dalia Peoples MD 75 mL/hr at 04/06/19 7146         LABS:     CBC:   Lab Results   Component Value Date    WBC 3.6 (L) 04/07/2019    HGB 11.9 (L) 04/07/2019    HCT 36.0 04/07/2019    .1 (H) 04/07/2019    PLT 65 (L) 04/07/2019    LYMPHOPCT 49.6 04/04/2019    RBC 3.59 (L) 04/07/2019    MCH 33.2 04/07/2019    MCHC 33.1 04/07/2019    RDW 16.7 (H) 04/07/2019    NEUTOPHILPCT 35.1 04/04/2019    MONOPCT 10.6 04/04/2019    BASOPCT 1.0 04/04/2019    NEUTROABS 1.8 04/04/2019    LYMPHSABS 2.6 04/04/2019    MONOSABS 0.6 04/04/2019    EOSABS 0.2 04/04/2019    BASOSABS 0.1 04/04/2019       CMP:   Lab Results   Component Value Date     04/07/2019    K 3.9 04/07/2019     04/07/2019    CO2 23 04/07/2019    BUN 11 04/07/2019    CREATININE 0.6 04/07/2019    GLUCOSE 120 04/07/2019    CALCIUM 7.7 04/07/2019    PROT 8.0 04/07/2019    LABALBU 2.4 04/07/2019    BILITOT 0.9 04/07/2019    ALKPHOS 251 04/07/2019     04/07/2019     04/07/2019      Results for Suzan Balderrama (MRN [id number]) as of 4/6/2019 10:25   Ref. Range 4/4/2019 21:11 4/5/2019 16:35 4/6/2019 07:08   Prothrombin Time Latest Ref Range: 9.8 - 13.0 sec 16.4 (H) 15.6 (H) 17.2 (H)   INR Latest Ref Range: 0.86 - 1.14  1.44 (H) 1.37 (H) 1.51 (H)   Fibrinogen Latest Ref Range: 200 - 397 mg/dL 177 (L)     aPTT Latest Ref Range: 26.0 - 36.0 sec 40.3 (H)       Amphetamine screen + 4/5/2019  Results for Suzan Balderrama (MRN [id number]) as of 4/6/2019 10:25   Ref. Range 4/4/2019 23:45 4/5/2019 06:08 4/5/2019 11:50 4/5/2019 16:35   LEIGH Latest Ref Range: Negative   Negative     IgA Latest Ref Range: 70.0 - 400.0 mg/dL   880.0 (H)    Total IgG Latest Ref Range: 700.0 - 1600.0 mg/dL   3626.0 (H)    IgM Latest Ref Range: 40.0 - 230.0 mg/dL   514.0 (H)    Hep A IgM Latest Ref Range: Non-reactive  Non-reactive      Hep B S Ab Latest Units: mIU/mL    <3.50   Hep B S Ag Interp Latest Ref Range: Non-reactive  Reactive (A)      Hep C Ab Interp Latest Ref Range: Non-reactive  REACTIVE (A)      Hep B Core Ab, IgM Latest Ref Range: Non-reactive  REACTIVE (A)        Results for Suzan Balderrama (MRN [id number]) as of 4/6/2019 10:25   Ref.  Range 4/5/2019 10:43 4/5/2019 10:47 4/5/2019 11:45 4/5/2019 12:25   HIV-1 Antibody Latest Ref Range:

## 2019-04-08 ENCOUNTER — APPOINTMENT (OUTPATIENT)
Dept: MRI IMAGING | Age: 44
DRG: 442 | End: 2019-04-08
Payer: MEDICARE

## 2019-04-08 LAB
ALBUMIN SERPL-MCNC: 2.5 G/DL (ref 3.4–5)
ALBUMIN SERPL-MCNC: 2.9 G/DL (ref 3.1–4.9)
ALP BLD-CCNC: 252 U/L (ref 40–129)
ALPHA-1-GLOBULIN: 0.2 G/DL (ref 0.2–0.4)
ALPHA-2-GLOBULIN: 0.5 G/DL (ref 0.4–1.1)
ALT SERPL-CCNC: 115 U/L (ref 10–40)
AST SERPL-CCNC: 211 U/L (ref 15–37)
BETA GLOBULIN: 1.3 G/DL (ref 0.9–1.6)
BILIRUB SERPL-MCNC: 0.8 MG/DL (ref 0–1)
BILIRUBIN DIRECT: 0.4 MG/DL (ref 0–0.3)
BILIRUBIN, INDIRECT: 0.4 MG/DL (ref 0–1)
GAMMA GLOBULIN: 3.4 G/DL (ref 0.6–1.8)
HCT VFR BLD CALC: 33.6 % (ref 36–48)
HEMOGLOBIN: 11.3 G/DL (ref 12–16)
INR BLD: 1.46 (ref 0.86–1.14)
MCH RBC QN AUTO: 33.7 PG (ref 26–34)
MCHC RBC AUTO-ENTMCNC: 33.7 G/DL (ref 31–36)
MCV RBC AUTO: 100.1 FL (ref 80–100)
PDW BLD-RTO: 16.5 % (ref 12.4–15.4)
PLATELET # BLD: 60 K/UL (ref 135–450)
PMV BLD AUTO: 10.2 FL (ref 5–10.5)
PROTHROMBIN TIME: 16.7 SEC (ref 9.8–13)
RBC # BLD: 3.36 M/UL (ref 4–5.2)
SPE/IFE INTERPRETATION: NORMAL
TOTAL PROTEIN: 7.9 G/DL (ref 6.4–8.2)
TOTAL PROTEIN: 8.3 G/DL (ref 6.4–8.2)
WBC # BLD: 4.2 K/UL (ref 4–11)

## 2019-04-08 PROCEDURE — 2580000003 HC RX 258: Performed by: FAMILY MEDICINE

## 2019-04-08 PROCEDURE — 86708 HEPATITIS A ANTIBODY: CPT

## 2019-04-08 PROCEDURE — 74181 MRI ABDOMEN W/O CONTRAST: CPT

## 2019-04-08 PROCEDURE — 85027 COMPLETE CBC AUTOMATED: CPT

## 2019-04-08 PROCEDURE — 80076 HEPATIC FUNCTION PANEL: CPT

## 2019-04-08 PROCEDURE — 1200000000 HC SEMI PRIVATE

## 2019-04-08 PROCEDURE — 2580000003 HC RX 258: Performed by: INTERNAL MEDICINE

## 2019-04-08 PROCEDURE — 36415 COLL VENOUS BLD VENIPUNCTURE: CPT

## 2019-04-08 PROCEDURE — 85610 PROTHROMBIN TIME: CPT

## 2019-04-08 PROCEDURE — 99232 SBSQ HOSP IP/OBS MODERATE 35: CPT | Performed by: INTERNAL MEDICINE

## 2019-04-08 PROCEDURE — 6370000000 HC RX 637 (ALT 250 FOR IP): Performed by: INTERNAL MEDICINE

## 2019-04-08 RX ORDER — POLYETHYLENE GLYCOL 3350 17 G/17G
17 POWDER, FOR SOLUTION ORAL DAILY
Status: DISCONTINUED | OUTPATIENT
Start: 2019-04-08 | End: 2019-04-09 | Stop reason: HOSPADM

## 2019-04-08 RX ADMIN — OXYCODONE HYDROCHLORIDE 5 MG: 5 TABLET ORAL at 09:43

## 2019-04-08 RX ADMIN — OXYCODONE HYDROCHLORIDE 5 MG: 5 TABLET ORAL at 20:24

## 2019-04-08 RX ADMIN — OXYCODONE HYDROCHLORIDE 5 MG: 5 TABLET ORAL at 15:27

## 2019-04-08 RX ADMIN — Medication 10 ML: at 09:43

## 2019-04-08 RX ADMIN — SODIUM CHLORIDE: 9 INJECTION, SOLUTION INTRAVENOUS at 06:17

## 2019-04-08 RX ADMIN — OXYCODONE HYDROCHLORIDE 5 MG: 5 TABLET ORAL at 00:16

## 2019-04-08 RX ADMIN — OXYCODONE HYDROCHLORIDE 5 MG: 5 TABLET ORAL at 05:04

## 2019-04-08 ASSESSMENT — PAIN DESCRIPTION - LOCATION
LOCATION: ABDOMEN
LOCATION: ABDOMEN

## 2019-04-08 ASSESSMENT — ENCOUNTER SYMPTOMS
NAUSEA: 0
SHORTNESS OF BREATH: 0
TROUBLE SWALLOWING: 0
COLOR CHANGE: 0
FACIAL SWELLING: 0
COUGH: 0
PHOTOPHOBIA: 0
CHOKING: 0
CHEST TIGHTNESS: 0
EYE REDNESS: 0
STRIDOR: 0
EYE DISCHARGE: 0
RHINORRHEA: 0
DIARRHEA: 0
BLOOD IN STOOL: 0
ABDOMINAL PAIN: 0
APNEA: 0

## 2019-04-08 ASSESSMENT — PAIN SCALES - GENERAL
PAINLEVEL_OUTOF10: 3
PAINLEVEL_OUTOF10: 4
PAINLEVEL_OUTOF10: 0
PAINLEVEL_OUTOF10: 5
PAINLEVEL_OUTOF10: 4
PAINLEVEL_OUTOF10: 0
PAINLEVEL_OUTOF10: 6
PAINLEVEL_OUTOF10: 0
PAINLEVEL_OUTOF10: 0
PAINLEVEL_OUTOF10: 5

## 2019-04-08 ASSESSMENT — PAIN DESCRIPTION - DESCRIPTORS: DESCRIPTORS: ACHING

## 2019-04-08 ASSESSMENT — PAIN DESCRIPTION - ORIENTATION: ORIENTATION: UPPER

## 2019-04-08 ASSESSMENT — PAIN - FUNCTIONAL ASSESSMENT: PAIN_FUNCTIONAL_ASSESSMENT: ACTIVITIES ARE NOT PREVENTED

## 2019-04-08 ASSESSMENT — PAIN DESCRIPTION - ONSET: ONSET: GRADUAL

## 2019-04-08 ASSESSMENT — PAIN DESCRIPTION - DIRECTION: RADIATING_TOWARDS: LOWER BACK

## 2019-04-08 ASSESSMENT — PAIN DESCRIPTION - FREQUENCY: FREQUENCY: INTERMITTENT

## 2019-04-08 ASSESSMENT — PAIN DESCRIPTION - PAIN TYPE
TYPE: ACUTE PAIN
TYPE: ACUTE PAIN

## 2019-04-08 NOTE — PROGRESS NOTES
ADVANCED CARE PLANNING    Name:Kimmy Rosario       :  1975              MRN:  [id number]      Purpose of Encounter: Advanced care planning in light of acute hepatitis. Parties in attendance: :Lizeth Dill MD.  Decisional Capacity:Yes    Subjective/Patient Story: Patient/family understand(s) that  her function continues to deteriorate. Patient/family wish(es) to continue further interventions and remain FULL code:  Yes    Objective/Medical Story: Pt admitted for LE cellulitis, thrombocytopenia, acute hepatitis, she admits she is an IVDU for the past 9 mos. .    Goals of Care Determinations: Patient/family wish(es to focus on treatment. Plan: Will notify . None (Inactive) of change in care plan. Will look at further interventions as needed. Code Status: At this time patient wishes to be Full Code    Time Spent on Advanced Planning Discussion: 20 minutes    Advanced Care Planning Documents: in chart        Electronically signed by Jacobo Brar MD on 2019 at 11:19 PM  Thank you . None (Inactive) for the opportunity to be involved in this patient's care. If you have any questions or concerns please feel free to contact me at [phone].

## 2019-04-08 NOTE — PROGRESS NOTES
Infectious Diseases   Progress Note      Admission Date: 4/4/2019  Hospital Day: Hospital Day: 5  Attending: Pj Ford MD  Date of service: 4/8/2019    Presenting complaint:   Chief Complaint   Patient presents with    Rash     to bilat lower legs treated for \"infection\" in right leg last month. no fevers pt states her legs are both swollen \"but not as bad as last time\"        Chief complaint/ Reason for consult: The patient was seen today for the following:    · Left leg rash   · Acute hepatitis B with tranasminitis  · Chronic hepatitis c  · IV drug user - amphetamine abuse    Microbiology:        I have reviewed all available micro lab data and cultures    Blood culture (2/2) - collected on 4/4/2019: Negative      Problem list:       Patient Active Problem List   Diagnosis Code    Knee pain, bilateral M25.561, M25.562    Right knee DJD M17.11    Left knee DJD M17.12    Thrombocytopenia ([address] D69.6    Cellulitis and abscess of lower extremity L03.119, L02.419    Tobacco abuse Z72.0    IVDU (intravenous drug user) F19.90    Acute hepatitis B B16.9    Hepatitis C B19.20    Cellulitis L03.90    Petechiae R23.3    Leg edema R60.0    Transaminitis R74.0    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 old female who  has a past medical history of Arthritis and Asthma. with following problems:    · Left leg rash - likely vasculitic rash  · Acute hepatitis B with tranasminitis - liver enzymes slowly improving  · Chronic hepatitis c  · IV drug user - drug counseling done  · Worsening thrombocytopenia - platelet count 02,595 today  · History of asthma  · Previous CT scan concerning for Pulmonary hypertension  · Obesity Class 2 due to excess calorie intake :  Body mass index is 35.51 kg/m². Discussion:      I had stopped her antibiotics on Friday due to low suspicion of infection. She has remained afebrile. Platelet count is 33,681 today    Urine tox screen was positive for amphetamines. AST and ALT are pretty much stable, bilirubin has come down to 0.8. HIV screen was negative. Nasal MRSA screen was negative    Immunoglobulin levels were okay. Hepatitis B screen was positive. ANCA screen was negative. Jluis was negative. Ultrasound and MRI of the gallbladder reviewed. A suspicion for gallbladder sludge. Plan:     Diagnostic Workup:    · Continue to follow  fever curve, WBC count and blood cultures  · Follow up on liver and renal function  · Follow-up on cryoglobulins level and porphyrin level as an outpatient    Antimicrobials:    · No evidence of infection. No need of antibiotics   · Leg rash likely secondary to  vascular necrosis/hepatitis C   · Hepatology following for hepatitis C. Hematology also following   · Little further to add from infection standpoint   · Discussed the above plan with patient and RN     I/v access Management:    · Continue to monitor i.v access sites for erythema, induration,discharge or tenderness. · As always, continue efforts to minimize tubes/ lines/ drains as clinically appropriate to reduce chances of line associated infections. Patient education and counseling:     · The patient was educated in detail about the side-effects of various antibiotics and things to watch for like new rashes, lip swelling, severe reaction, worsening diarrhea, break through fever etc.  · Discussed patient's condition and what to expect. All of the patient's questions were addressed in a satisfactory manner and patient verbalized understanding all instructions. Weight loss counseling:    Extensive weight loss counseling was done.  It is important to set a realistic weight loss goal. First goal should be to avoid gaining more weight and staying at current weight (or within 5 percent). People at high risk of developing diabetes who are able to lose 5 percent of their body weight and maintain this weight will reduce their risk of developing diabetes by about 50 percent and reduce their blood pressure. Losing more than 15 percent of  body weight and staying at this weight is an extremely good result, even if you never reach your \"dream\" or \"ideal\" weight. Lifestyle changes including changing eating habits, substituting excess carbohydrates with proteins, stress reduction, using self-help programs like Weight Watchers®, Overeaters Anonymous®, and Take Off Pounds Sensibly (TOPS)© , following DASH diet and increasing exercise or walking briskly daily for half hour to and hour 5-7 days a week was suggested among other measures. Information was given about various weight loss education programs and their websites like www.cdc.gov/healthyweight, www.choosemyplate.gov and www.health.gov/dietaryguidelines/    TIME SPENT TODAY:     - Spent over  26 minutes on visit (including interval history, physical exam, review of data including labs, cultures, imaging, development and implementation of treatment plan and coordination of complex care). - Over 50% of time spent with patient face to face on counseling and education. Thanks for allowing me to participate in your patient's care. I will sign off today, but will be available to answer any further questions or concerns that may arise during patient's stay in the hospital.      Subjective: Interval history: Patient was seen and examined at bedside. Interval history was obtained. The patient feels tired. Bilateral leg rash is improving. She is off antibiotics     REVIEW OF SYSTEMS:      Review of Systems   Constitutional: Negative for chills, diaphoresis and unexpected weight change. HENT: Negative for congestion, ear discharge, ear pain, facial swelling, hearing loss, rhinorrhea and trouble swallowing. Eyes: Negative for photophobia, discharge, redness and visual disturbance. Respiratory: Negative for apnea, cough, choking, chest tightness, shortness of breath and stridor. Cardiovascular: Negative for chest pain and palpitations. Gastrointestinal: Negative for abdominal pain, blood in stool, diarrhea and nausea. Endocrine: Negative for polydipsia, polyphagia and polyuria. Genitourinary: Negative for difficulty urinating, dysuria, frequency, hematuria, menstrual problem and vaginal discharge. Musculoskeletal: Negative for arthralgias, joint swelling, myalgias and neck stiffness. Skin: Positive for rash (Bilateral leg rash improving). Negative for color change. Allergic/Immunologic: Negative for immunocompromised state. Neurological: Negative for dizziness, seizures, speech difficulty, light-headedness and headaches. Hematological: Negative for adenopathy. Psychiatric/Behavioral: Negative for agitation, hallucinations and suicidal ideas. Past Medical History: All past medical history reviewed today. Past Medical History:   Diagnosis Date    Arthritis     Asthma        Past Surgical History: All past surgical history was reviewed today. History reviewed. No pertinent surgical history. Immunization History: All immunization history was reviewed by me today. There is no immunization history on file for this patient. Family History: All family history was reviewed today.         Problem Relation Age of Onset    Arthritis Other     Asthma Other     Cancer Other     Diabetes Other     High Blood Pressure Other        Objective:       PHYSICAL EXAM:      Vitals:   Vitals:    04/08/19 0443 04/08/19 0624 04/08/19 0940 04/08/19 1254   BP: 123/72  (!) 161/100 (!) 144/83   Pulse: 72 68 71 67   Resp: 16  16 16   Temp: 98.2 °F (36.8 °C)  98.2 °F (36.8 °C) 98.2 °F (36.8 °C)   TempSrc: Oral  Oral Oral   SpO2: 96%  100% 99%   Weight:       Height:           Physical Exam 7. 9* 7.7*   GLUCOSE 79 87 120*        Hepatic Function Panel:   Lab Results   Component Value Date    ALKPHOS 252 04/08/2019     04/08/2019     04/08/2019    PROT 7.9 04/08/2019    BILITOT 0.8 04/08/2019    BILIDIR 0.4 04/08/2019    IBILI 0.4 04/08/2019    LABALBU 2.5 04/08/2019       CPK: No results found for: CKTOTAL  ESR:   Lab Results   Component Value Date    SEDRATE 100 (H) 03/06/2019     CRP: No results found for: CRP        Imaging: All pertinent images and reports for the current visit were reviewed by me during this visit. MRI ABDOMEN WO CONTRAST MRCP   Final Result   Status post cholecystectomy. There is decreased bile duct dilatation   compared to recent CT scan      Small filling defects seen in extrahepatic common duct likely sludge or tiny   stone      Mild splenomegaly. There is a subtle lobular contour to the liver raising   the question of underlying cirrhosis         US GALLBLADDER RUQ   Final Result   Liver appears unremarkable in appearance. Status post cholecystectomy. Mild dilation of the common bile duct with questionable debris. Please   correlate with patient's bilirubin level and liver function test.  MRCP could   be useful to further characterize for possible debris, choledocholithiasis. VL EXTREMITY VENOUS BILATERAL    (Results Pending)       Medications: All current and past medications were reviewed.  sodium chloride flush  10 mL Intravenous 2 times per day    nicotine  1 patch Transdermal Daily        sodium chloride 75 mL/hr at 04/08/19 0617       oxyCODONE, sodium chloride flush, ondansetron    Current antibiotics: All antibiotics and their doses were reviewed by me today    Recent Abx Admin      No antibiotic orders with administrations found. Known drug Allergies:  All allergies were reviewed and updated    Allergies   Allergen Reactions    Darvocet A500 [Propoxyphene N-Acetaminophen]     Penicillins            Please note that this chart was generated using Dragon dictation software. Although every effort was made to ensure the accuracy of this automated transcription, some errors in transcription may have occurred inadvertently. If you may need any clarification, please do not hesitate to contact me through EPIC or at the phone number provided below with my electronic signature.     Kim Olivier MD, MPH  4/8/2019, 1:08 PM  [facility] Infectious Disease   Office: [phone]  Fax: [phone]  Tuesday AM clinic:   [address] [place] [address] 120  Thursday AM [id number] [facility]

## 2019-04-08 NOTE — PROGRESS NOTES
Hospital Medicine Progress Note     Date:  4/8/2019    PCP: . None (Inactive) (Tel: None)    Date of Admission: 4/4/2019      Brief hospital course: Pt admitted for LE cellulitis, thrombocytopenia, acute hepatitis, she admits she is an IVDU for the past 9 mos. Subjective  No new complaints    Objective  Physical exam:  Vitals: BP (!) 144/83   Pulse 67   Temp 98.2 °F (36.8 °C) (Oral)   Resp 16   Ht 5' 6\" (1.676 m)   Wt 220 lb (99.8 kg)   SpO2 99%   BMI 35.51 kg/m²   Gen: Not in distress. Alert. Head: Normocephalic. Atraumatic. Eyes: EOMI. Good acuity. ENT: Oral mucosa moist  Neck: No JVD. No obvious thyromegaly. CVS: Nml S1S2, no MRG, RRR  Pulmomary: Clear bilaterally. No crackles. No wheezes. Gastrointestinal: Soft, NT/ND. Positive bowel sounds. Musculoskeletal: No edema. Warm  Neuro: No focal deficit. Moves extremity spontaneously. Psychiatry: Appropriate affect. Not agitated. Skin: petechial rash B/L LE, erythema of left LE with no more TTP and very mild warmth - improving      24HR INTAKE/OUTPUT:      Intake/Output Summary (Last 24 hours) at 4/8/2019 1359  Last data filed at 4/8/2019 0444  Gross per 24 hour   Intake 2628 ml   Output --   Net 2628 ml     I/O last 3 completed shifts: In: 2628 [I.V.:2628]  Out: -   No intake/output data recorded.       Meds:    sodium chloride flush  10 mL Intravenous 2 times per day    nicotine  1 patch Transdermal Daily       Infusions:    sodium chloride 75 mL/hr at 04/08/19 0617         PRN Meds: oxyCODONE, sodium chloride flush, ondansetron    Labs/imaging:  CBC:   Recent Labs     04/06/19  0708 04/07/19  0702 04/08/19  0550   WBC 4.1 3.6* 4.2   HGB 12.1 11.9* 11.3*   PLT 64* 65* 60*         BMP:    Recent Labs     04/05/19  1635 04/06/19  0708 04/07/19  0702   * 138 139   K 3.9 4.2 3.9    105 109   CO2 27 25 23   BUN 9 9 11   CREATININE 0.6 0.6 0.6   GLUCOSE 79 87 120*         Hepatic:   Recent Labs     04/06/19  0708 04/07/19  0702 04/08/19  0550   * 214* 211*   * 116* 115*   BILITOT 1.2* 0.9 0.8   ALKPHOS 235* 251* 252*       Troponin: No results for input(s): TROPONINI in the last 72 hours. BNP: No results for input(s): BNP in the last 72 hours. INR:   Recent Labs     04/06/19  0708 04/07/19  0703 04/08/19  0550   INR 1.51* 1.42* 1.46*           Reviewed imaging and reports noted      Assessment:  Active Problems: Thrombocytopenia ([address]    Cellulitis and abscess of lower extremity    Tobacco abuse    IVDU (intravenous drug user)    Acute hepatitis B    Hepatitis C    Cellulitis    Petechiae    Leg edema    Transaminitis    Vasculitis of skin    History of asthma    Pulmonary hypertension (HCC)    Class 2 obesity due to excess calories with body mass index (BMI) of 35.0 to 35.9 in adult  Resolved Problems:    * No resolved hospital problems. *        Plan:  Cellulitis of LEFT LE - suspected initially but now felt 2/2 vasculitis  - ID following  - B/L LE dopplers pending      Acute Hepatitis  - HEB B and C +  - GI following, US RUQ noted  - cont supportive care      Thrombocytopenia  - Hem/ONc following, blood smear pending      IVDU  - UDS + for amphetamine, states used heroin last about 1 month ago  - encouraged continued cessation      Tobacco Abuse  - nicoderm, advised to quit        Diet: DIET GENERAL;     Activity: up as tolerated  Prophylaxis: SCD    Code status: Full Code     ----------        Norbert Grissom MD  -------------------------------  [place] hospitalist

## 2019-04-08 NOTE — PROGRESS NOTES
PRN pain medication given. See MAR. Pt sitting up on side of bed. Denies needs at this time. Will continue to monitor.

## 2019-04-08 NOTE — PROGRESS NOTES
Shift assessment complete. See flowsheet. VSS. Pt resting in bed. Pt reports no pain at this time stating \"right now there's nothing. It's when I get up and walk around. \" Call light within reach. Pt denies other needs at time. Will continue to monitor.

## 2019-04-09 VITALS
BODY MASS INDEX: 35.36 KG/M2 | OXYGEN SATURATION: 99 % | HEIGHT: 66 IN | HEART RATE: 71 BPM | TEMPERATURE: 97.9 F | SYSTOLIC BLOOD PRESSURE: 143 MMHG | RESPIRATION RATE: 16 BRPM | DIASTOLIC BLOOD PRESSURE: 91 MMHG | WEIGHT: 220 LBS

## 2019-04-09 LAB
ALBUMIN SERPL-MCNC: 2.5 G/DL (ref 3.4–5)
ALP BLD-CCNC: 241 U/L (ref 40–129)
ALT SERPL-CCNC: 129 U/L (ref 10–40)
ANION GAP SERPL CALCULATED.3IONS-SCNC: 9 MMOL/L (ref 3–16)
AST SERPL-CCNC: 236 U/L (ref 15–37)
BILIRUB SERPL-MCNC: 0.9 MG/DL (ref 0–1)
BILIRUBIN DIRECT: 0.4 MG/DL (ref 0–0.3)
BILIRUBIN, INDIRECT: 0.5 MG/DL (ref 0–1)
BUN BLDV-MCNC: 11 MG/DL (ref 7–20)
CALCIUM SERPL-MCNC: 8.1 MG/DL (ref 8.3–10.6)
CHLORIDE BLD-SCNC: 105 MMOL/L (ref 99–110)
CO2: 25 MMOL/L (ref 21–32)
CREAT SERPL-MCNC: 0.6 MG/DL (ref 0.6–1.1)
DRVVT CONFIRMATION TEST: ABNORMAL
DRVVT SCREEN: 36 SEC (ref 33–44)
DRVVT,DIL: ABNORMAL SEC (ref 33–44)
FACTOR VII ACTIVITY: 49 % (ref 80–181)
FACTOR X ACTIVITY: 44 % (ref 81–157)
GFR AFRICAN AMERICAN: >60
GFR NON-AFRICAN AMERICAN: >60
GLUCOSE BLD-MCNC: 95 MG/DL (ref 70–99)
HAV AB SERPL IA-ACNC: POSITIVE
HBV QNT LOG, IU/ML: 6.95 LOG IU/ML
HBV QNT, IU/ML: ABNORMAL
HCT VFR BLD CALC: 35.8 % (ref 36–48)
HCV QNT BY NAAT IU/ML: NOT DETECTED IU/ML
HCV QNT BY NAAT LOG IU/ML: NOT DETECTED LOG IU/ML
HEMOGLOBIN: 12.3 G/DL (ref 12–16)
HEPATITIS B CORE TOTAL ANTIBODY: POSITIVE
HEPATITIS B SURFACE ANTIGEN CONFIRMATION: POSITIVE
HEXAGONAL PHOSPHOLIPID NEUTRALIZAT TEST: ABNORMAL
INR BLD: 1.45 (ref 0.86–1.14)
INTERPRETATION: DETECTED
INTERPRETATION: NOT DETECTED
LUPUS ANTICOAG INTERP: ABNORMAL
MCH RBC QN AUTO: 33.9 PG (ref 26–34)
MCHC RBC AUTO-ENTMCNC: 34.2 G/DL (ref 31–36)
MCV RBC AUTO: 99.2 FL (ref 80–100)
PDW BLD-RTO: 16.4 % (ref 12.4–15.4)
PLATELET # BLD: 65 K/UL (ref 135–450)
PLT NEUTA: ABNORMAL
PMV BLD AUTO: 9.9 FL (ref 5–10.5)
POTASSIUM REFLEX MAGNESIUM: 3.6 MMOL/L (ref 3.5–5.1)
PROTHROMBIN TIME: 16.5 SEC (ref 9.8–13)
PT D: 16.3 SEC (ref 12–15.5)
PTT D: 49 SEC (ref 32–48)
PTT-D CORR REFLEX: ABNORMAL SEC (ref 32–48)
PTT-HEPARIN NEUTRALIZED: 47 SEC (ref 32–48)
RBC # BLD: 3.61 M/UL (ref 4–5.2)
REPTILASE TIME: 28.2 SEC
SODIUM BLD-SCNC: 139 MMOL/L (ref 136–145)
THROMBIN TIME: 20.5 SEC (ref 14.7–19.5)
TOTAL PROTEIN: 8.6 G/DL (ref 6.4–8.2)
WBC # BLD: 4.3 K/UL (ref 4–11)

## 2019-04-09 PROCEDURE — 80048 BASIC METABOLIC PNL TOTAL CA: CPT

## 2019-04-09 PROCEDURE — 93970 EXTREMITY STUDY: CPT

## 2019-04-09 PROCEDURE — 6360000002 HC RX W HCPCS: Performed by: INTERNAL MEDICINE

## 2019-04-09 PROCEDURE — 36415 COLL VENOUS BLD VENIPUNCTURE: CPT

## 2019-04-09 PROCEDURE — 2580000003 HC RX 258: Performed by: INTERNAL MEDICINE

## 2019-04-09 PROCEDURE — 80076 HEPATIC FUNCTION PANEL: CPT

## 2019-04-09 PROCEDURE — 6370000000 HC RX 637 (ALT 250 FOR IP): Performed by: INTERNAL MEDICINE

## 2019-04-09 PROCEDURE — 85027 COMPLETE CBC AUTOMATED: CPT

## 2019-04-09 PROCEDURE — 85610 PROTHROMBIN TIME: CPT

## 2019-04-09 RX ADMIN — ONDANSETRON 4 MG: 2 INJECTION INTRAMUSCULAR; INTRAVENOUS at 09:46

## 2019-04-09 RX ADMIN — OXYCODONE HYDROCHLORIDE 5 MG: 5 TABLET ORAL at 02:54

## 2019-04-09 RX ADMIN — Medication 10 ML: at 09:46

## 2019-04-09 RX ADMIN — OXYCODONE HYDROCHLORIDE 5 MG: 5 TABLET ORAL at 09:46

## 2019-04-09 ASSESSMENT — PAIN SCALES - GENERAL
PAINLEVEL_OUTOF10: 0
PAINLEVEL_OUTOF10: 2
PAINLEVEL_OUTOF10: 4
PAINLEVEL_OUTOF10: 5

## 2019-04-09 ASSESSMENT — PAIN DESCRIPTION - ORIENTATION: ORIENTATION: MID

## 2019-04-09 ASSESSMENT — PAIN DESCRIPTION - LOCATION: LOCATION: ABDOMEN

## 2019-04-09 ASSESSMENT — PAIN DESCRIPTION - PAIN TYPE: TYPE: ACUTE PAIN

## 2019-04-09 NOTE — DISCHARGE SUMMARY
obesity due to excess calories with body mass index (BMI) of 35.0 to 35.9 in adult  Resolved Problems:    * No resolved hospital problems. *      Physical Exam: BP (!) 143/91   Pulse 71   Temp 97.9 °F (36.6 °C) (Oral)   Resp 16   Ht 5' 6\" (1.676 m)   Wt 220 lb (99.8 kg)   SpO2 99%   BMI 35.51 kg/m²   Gen: Not in distress. Alert. Head: Normocephalic. Atraumatic. Eyes: EOMI. Good acuity. ENT: Oral mucosa moist  Neck: No JVD. No obvious thyromegaly. CVS: Nml S1S2, no MRG, RRR  Pulmomary: Clear bilaterally. No crackles. No wheezes. Gastrointestinal: Soft, NT/ND. Positive bowel sounds. Musculoskeletal: No edema. Warm  Neuro: No focal deficit. Moves extremity spontaneously. Psychiatry: Appropriate affect. Not agitated. Skin: petechial rash B/L LE, erythema of left LE with no more TTP and very mild warmth - improving          Significant diagnostic studies that may require follow up:  Us Gallbladder Ruq    Result Date: 4/6/2019  EXAMINATION: RIGHT UPPER QUADRANT ULTRASOUND 4/6/2019 8:45 am COMPARISON: CT chest 03/06/2019 HISTORY: ORDERING SYSTEM PROVIDED HISTORY: HEPATITIS TECHNOLOGIST PROVIDED HISTORY: Ordering Physician Provided Reason for Exam: hepatitis Acuity: Unknown Type of Exam: Unknown FINDINGS: LIVER:  The liver demonstrates normal echogenicity without evidence of intrahepatic biliary ductal dilatation. BILIARY SYSTEM:  Patient is status post cholecystectomy. Common bile duct is mildly dilated measuring 1.3 cm. Findings compatible with prior cholecystectomy. Questionable debris within the common bile duct versus artifact. Please correlate with patient's bilirubin level. MRCP may be useful to further characterize as clinically indicated RIGHT KIDNEY: The right kidney is grossly unremarkable without evidence of hydronephrosis. Right kidney measures 12.1 x 5.7 x 5.2 cm. PANCREAS:  Obscured by bowel gas. OTHER: No evidence of right upper quadrant ascites. Liver appears unremarkable in appearance. Status post cholecystectomy. Mild dilation of the common bile duct with questionable debris. Please correlate with patient's bilirubin level and liver function test.  MRCP could be useful to further characterize for possible debris, choledocholithiasis. Mri Abdomen Wo Contrast Mrcp    Result Date: 4/8/2019  EXAMINATION: MRI OF THE ABDOMEN WITHOUT CONTRAST AND MRCP 4/8/2019 8:35 am TECHNIQUE: Multiplanar multisequence MRI of the abdomen was performed without the administration of intravenous contrast.  After initial T2 axial and coronal images, thick slab, thin slab and 3D coronal MRCP sequences were obtained without the administration of intravenous contrast.  MIP images are provided for review. COMPARISON: Recent ultrasound HISTORY: ORDERING SYSTEM PROVIDED HISTORY: ABNORMAL LIVER FUNCTION TESTS TECHNOLOGIST PROVIDED HISTORY: Ordering Physician Provided Reason for Exam: Abnormal liver function test, abnormal ultrasound FINDINGS: Motion artifact degrades study. This decreases sensitivity and specificity of the exam Gallbladder: Gallbladder is surgically absent Bile Ducts: No significant intrahepatic biliary ductal dilatation is seen. Extrahepatic duct measures 6 mm. Bile duct dilatation on this exam appears less pronounced than recent ultrasound. Small filling defect is seen in the extrahepatic common duct on axial image number 22. Pancreatic Duct: No significant pancreatic ductal dilatation. Other:  Spleen is mildly enlarged measuring 14 cm. No hydronephrosis on right or left. Mild stool load is seen in the colon. Anterior abdominal hernia containing fat is seen. There is a subtle lobular contour to the liver Degenerative changes are seen in the spine     Status post cholecystectomy. There is decreased bile duct dilatation compared to recent CT scan Small filling defects seen in extrahepatic common duct likely sludge or tiny stone Mild splenomegaly.   There is a subtle lobular contour to the liver raising the question of underlying cirrhosis             Treatments: As above. Discharge Medications:     Medication List      STOP taking these medications    gabapentin 300 MG capsule  Commonly known as:  NEURONTIN     traZODone 50 MG tablet  Commonly known as:  DESYREL            Activity: activity as tolerated  Diet: DIET GENERAL;  Diet NPO, After Midnight      Disposition: home  Discharged Condition: Stable  Follow Up:   [facility]) Pre-Services  [phone]  Schedule an appointment as soon as possible for a visit       [facility] Emergency Department  [address] [place] [address]  [phone]    If symptoms worsen    [facility]) Pre-Services  [phone]        call [facility] vascular lab tomorrow morning to schedule a venous duplex tomorrow morning. Call in 1 day      . None              Code status:  Full Code         Total time spent on discharge, finalizing medications, referrals and arranging outpatient follow up was more than 1 hour      Thank you Dr. Kwasi Wan (Inactive) for the opportunity to be involved in this patients care.

## 2019-04-09 NOTE — PROGRESS NOTES
[facility] GI  Gastroenterology Progress Note    Venice Allan is a 38 y.o. female patient. 1. Thrombocytopenia (HCC)    2. Petechiae    3. Leg edema - bilateral    4. Transaminitis        SUBJECTIVE:    Mild gas pain under bilateral ribs  LE erythema much improved    ROS:  Cardiovascular ROS: no chest pain or dyspnea on exertion  Gastrointestinal ROS: see above  Respiratory ROS: no cough, shortness of breath, or wheezing    Physical    VITALS:  BP (!) 158/84   Pulse 74   Temp 98 °F (36.7 °C) (Oral)   Resp 16   Ht 5' 6\" (1.676 m)   Wt 220 lb (99.8 kg)   SpO2 99%   BMI 35.51 kg/m²   TEMPERATURE:  Current - Temp: 98 °F (36.7 °C); Max - Temp  Av.3 °F (36.8 °C)  Min: 98 °F (36.7 °C)  Max: 98.6 °F (37 °C)    NAD  RRR, Nl s1s2  Lungs CTA Bilaterally, normal effort  Abdomen soft, ND, NT, no HSM, Bowel sounds normal, small ventral hernia  AAOx3, No asterixis   L leg erythema, non tender to touch, no ulcers       Data      CBC:   Recent Labs     19  0708 19  0702 19  0550   WBC 4.1 3.6* 4.2   RBC 3.58* 3.59* 3.36*   HGB 12.1 11.9* 11.3*   HCT 36.2 36.0 33.6*   PLT 64* 65* 60*   .2* 100.1* 100.1*   MCH 33.7 33.2 33.7   MCHC 33.3 33.1 33.7   RDW 16.5* 16.7* 16.5*        BMP:  Recent Labs     19  0708 19  0702    139   K 4.2 3.9    109   CO2 25 23   BUN 9 11   CREATININE 0.6 0.6   CALCIUM 7.9* 7.7*   GLUCOSE 87 120*        Hepatic Function Panel:   Lab Results   Component Value Date    ALKPHOS 252 2019     2019     2019    PROT 7.9 2019    BILITOT 0.8 2019    BILIDIR 0.4 2019    IBILI 0.4 2019    LABALBU 2.5 2019       No results for input(s): LIPASE, AMYLASE in the last 72 hours. Recent Labs     19  0708 19  0703 19  0550   PROTIME 17.2* 16.2* 16.7*   INR 1.51* 1.42* 1.46*     No results for input(s): PTT in the last 72 hours. No results for input(s): OCCULTBLD in the last 72 hours. MRCP 4/8/2019  Status post cholecystectomy. Chilango Coffer is decreased bile duct dilatation   compared to recent CT scan       Small filling defects seen in extrahepatic common duct likely sludge or tiny   stone       Mild splenomegaly. Chilango Coffer is a subtle lobular contour to the liver raising   the question of underlying cirrhosis           Assessment:      Active Problems:    Thrombocytopenia ([address]  Resolved Problems:    * No resolved hospital problems. *     Hepatitis - , . Alk phos 248 with normal bili. transaminases in the 50-60's in 2016 per labs in [facility]. No prior dx of liver disease.   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 Petechial rash - concerning for vasculitis.  Cryoglobulin sent per heme.   IVDU - heroine and methamphetamine   Asymptomatic ventral hernia   RUQ with dilated CBD with debris. Remote cholecystectomy. MRCP showing non-obstructing sludge or small CBD stones     Recommendations:   - await Hep B DNA and HCV RNA.   - f/u cryoglobulin per heme  - follow liver enzymes, INR  - heme following and ID consulted as well  - drug cessation. SW consult for rehab programs.   - Hold off ERCP at this time.     - anticipate D/C soon      Christin Pina MD, MSc  [address]

## 2019-04-09 NOTE — PROGRESS NOTES
Pt awake has some ABD pain Prn given pt states she is ready for discharge.  Pt resting with call light in reach

## 2019-04-09 NOTE — PROGRESS NOTES
Pt refused ERCP for tomorrow. Pt wants to go home. Awaiting results from doppler per MD before discharging.

## 2019-04-09 NOTE — PROGRESS NOTES
[facility]   Progress Note      4/9/2019 8:38 AM        Name: Elsie Villanueva . Admitted: 4/4/2019    SUBJECTIVE:  She is feeling better, legs look better, redness is much improved. Plts stable at 65k    Reviewed interval ancillary notes    Current Medications    polyethylene glycol (GLYCOLAX) packet 17 g Daily   oxyCODONE (ROXICODONE) immediate release tablet 5 mg Q4H PRN   sodium chloride flush 0.9 % injection 10 mL 2 times per day   sodium chloride flush 0.9 % injection 10 mL PRN   ondansetron (ZOFRAN) injection 4 mg Q6H PRN   nicotine (NICODERM CQ) 21 MG/24HR 1 patch Daily   0.9 % sodium chloride infusion Continuous       Objective:  BP (!) 154/99   Pulse 76   Temp 97.9 °F (36.6 °C) (Oral)   Resp 16   Ht 5' 6\" (1.676 m)   Wt 220 lb (99.8 kg)   SpO2 98%   BMI 35.51 kg/m²     Intake/Output Summary (Last 24 hours) at 4/9/2019 0838  Last data filed at 4/9/2019 0407  Gross per 24 hour   Intake 1741 ml   Output 0 ml   Net 1741 ml    Wt Readings from Last 3 Encounters:   04/04/19 220 lb (99.8 kg)   03/06/19 220 lb (99.8 kg)   11/18/13 265 lb (120.2 kg)       General appearance:  Appears comfortable  Eyes: Sclera clear. Pupils equal.  ENT: Moist oral mucosa. Trachea midline, no adenopathy. Cardiovascular: Regular rhythm, normal S1, S2. No murmur. No edema in lower extremities  Respiratory: Not using accessory muscles. Good inspiratory effort. Clear to auscultation bilaterally, no wheeze or crackles. GI: Abdomen soft, no tenderness, not distended  Musculoskeletal: No cyanosis in digits, neck supple  Neurology: CN 2-12 grossly intact. No speech or motor deficits  Psych: Normal affect.  Alert and oriented in time, place and person  Skin: Warm, dry, normal turgor    Labs and Tests:  CBC:   Recent Labs     04/07/19  0702 04/08/19  0550 04/09/19  0554   WBC 3.6* 4.2 4.3   HGB 11.9* 11.3* 12.3   PLT 65* 60* 65*     BMP:  Recent Labs     04/07/19  0702 04/09/19  0554    139   K 3.9 3.6    105   CO2 23 25   BUN 11 11   CREATININE 0.6 0.6   GLUCOSE 120* 95     Hepatic: Recent Labs     04/07/19  0702 04/08/19  0550 04/09/19  0554   * 211* 236*   * 115* 129*   BILITOT 0.9 0.8 0.9   ALKPHOS 251* 252* 241*       ASSESSMENT AND PLAN    Active Problems: Thrombocytopenia ([address]    Cellulitis and abscess of lower extremity    Tobacco abuse    IVDU (intravenous drug user)    Acute hepatitis B    Hepatitis C    Cellulitis    Petechiae    Leg edema    Transaminitis    Vasculitis of skin    History of asthma    Pulmonary hypertension (HCC)    Class 2 obesity due to excess calories with body mass index (BMI) of 35.0 to 35.9 in adult  Resolved Problems:    * No resolved hospital problems. *      TCP  - plts 65 today without signs of active bleeding  - likely d/t liver disease, SPEP and ANCA normal, cryoglobulin pending  - monitor plt count.      Elevated liver enzymes  - positive for acute hepatitis B & likely hep C, labs pending  - GI following     Lower extremity rash/vasculitis  - improved     OK for discharge from hematology perspective. Rickey Chau CNP  [facility]    Patient clinically stable Will need follow up with liver service.

## 2019-04-09 NOTE — PROGRESS NOTES
Hospital Medicine Progress Note     Date:  4/9/2019    PCP: . None (Inactive) (Tel: None)    Date of Admission: 4/4/2019      Brief hospital course: Pt admitted for LE cellulitis, thrombocytopenia, acute hepatitis, she admits she is an IVDU for the past 9 mos. Subjective  No new complaints    Objective  Physical exam:  Vitals: BP (!) 154/99   Pulse 76   Temp 97.9 °F (36.6 °C) (Oral)   Resp 16   Ht 5' 6\" (1.676 m)   Wt 220 lb (99.8 kg)   SpO2 98%   BMI 35.51 kg/m²   Gen: Not in distress. Alert. Head: Normocephalic. Atraumatic. Eyes: EOMI. Good acuity. ENT: Oral mucosa moist  Neck: No JVD. No obvious thyromegaly. CVS: Nml S1S2, no MRG, RRR  Pulmomary: Clear bilaterally. No crackles. No wheezes. Gastrointestinal: Soft, NT/ND. Positive bowel sounds. Musculoskeletal: No edema. Warm  Neuro: No focal deficit. Moves extremity spontaneously. Psychiatry: Appropriate affect. Not agitated. Skin: petechial rash B/L LE, erythema of left LE with no more TTP and very mild warmth - improving      24HR INTAKE/OUTPUT:      Intake/Output Summary (Last 24 hours) at 4/9/2019 1234  Last data filed at 4/9/2019 0407  Gross per 24 hour   Intake 1741 ml   Output 0 ml   Net 1741 ml     I/O last 3 completed shifts: In: 1116 [P.O.:240; I.V.:1501]  Out: 0   No intake/output data recorded.       Meds:    polyethylene glycol  17 g Oral Daily    sodium chloride flush  10 mL Intravenous 2 times per day    nicotine  1 patch Transdermal Daily       Infusions:    sodium chloride 75 mL/hr at 04/08/19 0617         PRN Meds: oxyCODONE, sodium chloride flush, ondansetron    Labs/imaging:  CBC:   Recent Labs     04/07/19  0702 04/08/19  0550 04/09/19  0554   WBC 3.6* 4.2 4.3   HGB 11.9* 11.3* 12.3   PLT 65* 60* 65*         BMP:    Recent Labs     04/07/19  0702 04/09/19  0554    139   K 3.9 3.6    105   CO2 23 25   BUN 11 11   CREATININE 0.6 0.6   GLUCOSE 120* 95         Hepatic:   Recent Labs     04/07/19  0702

## 2019-04-09 NOTE — PROGRESS NOTES
[facility]  Gastroenterology Progress Note    Amandeep Rendon is a 38 y.o. female patient. 1. Thrombocytopenia (HCC)    2. Petechiae    3. Leg edema - bilateral    4. Transaminitis        SUBJECTIVE:  No abdominal pain. BLE rash nearly resolved. ROS:  Cardiovascular ROS: no chest pain or dyspnea on exertion  Gastrointestinal ROS: see above  Respiratory ROS: no cough, shortness of breath, or wheezing    Physical    VITALS:  BP (!) 154/99   Pulse 76   Temp 97.9 °F (36.6 °C) (Oral)   Resp 16   Ht 5' 6\" (1.676 m)   Wt 220 lb (99.8 kg)   SpO2 98%   BMI 35.51 kg/m²   TEMPERATURE:  Current - Temp: 97.9 °F (36.6 °C); Max - Temp  Av.1 °F (36.7 °C)  Min: 97.9 °F (36.6 °C)  Max: 98.5 °F (36.9 °C)    NAD  RRR, Nl s1s2  Lungs CTA Bilaterally, normal effort  Abdomen soft, ND, NT, no HSM, Bowel sounds normal, small ventral hernia  AAOx3, No asterixis   L leg minimal erythema, non tender to touch, no ulcers       Data      CBC:   Recent Labs     19  0702 19  0550 19  0554   WBC 3.6* 4.2 4.3   RBC 3.59* 3.36* 3.61*   HGB 11.9* 11.3* 12.3   HCT 36.0 33.6* 35.8*   PLT 65* 60* 65*   .1* 100.1* 99.2   MCH 33.2 33.7 33.9   MCHC 33.1 33.7 34.2   RDW 16.7* 16.5* 16.4*        BMP:  Recent Labs     19  0702 19  0554    139   K 3.9 3.6    105   CO2 23 25   BUN 11 11   CREATININE 0.6 0.6   CALCIUM 7.7* 8.1*   GLUCOSE 120* 95        Hepatic Function Panel:   Lab Results   Component Value Date    ALKPHOS 241 2019     2019     2019    PROT 8.6 2019    BILITOT 0.9 2019    BILIDIR 0.4 2019    IBILI 0.5 2019    LABALBU 2.5 2019       No results for input(s): LIPASE, AMYLASE in the last 72 hours. Recent Labs     19  0703 19  0550 19  0554   PROTIME 16.2* 16.7* 16.5*   INR 1.42* 1.46* 1.45*     No results for input(s): PTT in the last 72 hours. No results for input(s): OCCULTBLD in the last 72 hours. MRCP 4/8/2019  Status post cholecystectomy. Gradie Bran is decreased bile duct dilatation   compared to recent CT scan       Small filling defects seen in extrahepatic common duct likely sludge or tiny   stone       Mild splenomegaly. Gradie Bran is a subtle lobular contour to the liver raising   the question of underlying cirrhosis           Assessment:      Active Problems:    Thrombocytopenia ([address]  Resolved Problems:    * No resolved hospital problems. *     Hepatitis - , . Alk phos 248 with normal bili. transaminases in the 50-60's in 2016 per labs in [facility]. No prior dx of liver disease.   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 Petechial rash - concerning for vasculitis.  Cryoglobulin sent per heme. rash nearly resolved. IVDU - heroine and methamphetamine   Asymptomatic ventral hernia   RUQ with dilated CBD with debris. Remote cholecystectomy. MRCP showing non-obstructing sludge or small CBD stones     Recommendations:   - await Hep B DNA and HCV RNA.   - f/u cryoglobulin per heme  - follow liver enzymes, INR  - heme following and ID consulted as well  - drug cessation. SW consult for rehab programs.    - would rec ERCP. Can proceed with this tomorrow. Discussed with Dr. Jerrod Peres, [facility]    I have personally performed a face to face diagnostic evaluation on this patient. I have interviewed and examined the patient and I agree with the findings and recommended plan of care. In summary, my findings and plan are the following:    LE rash/erythema resolved. I discussed her MRCP results with Dr. Giovanni Aase. We offered to do her ERCP tomorrow. However, patient refused, as she is asymptomatic currently and want to go home. HCV RNA undetected. We are still awaiting for Hep B DNA. Patient may be discharged home. Follow-up with GI in 2-4 weeks. Linda Jade MD, MSc  [address]

## 2019-04-09 NOTE — PLAN OF CARE
Problem: Falls - Risk of:  Goal: Will remain free from falls  Description  Will remain free from falls  Outcome: Ongoing     Pt has remained free from falls, utilizes call light appropriately and is able to ambulate independently.

## 2019-04-10 LAB
BLOOD CULTURE, ROUTINE: NORMAL
CULTURE, BLOOD 2: NORMAL
HEPATITIS BE ANTIBODY: NEGATIVE
HEPATITIS BE ANTIGEN: POSITIVE
PORPHYRIN INTERPRETATION: NEGATIVE
PORPHYRIN TOTAL: <10 NMOL/L (ref 0–15)

## 2019-04-11 LAB
CRYOGLOBULIN, QUALITATIVE: ABNORMAL
HEPATITIS C GENOTYPE: NORMAL

## 2019-04-12 LAB — CRYOGLOBULIN, QUALITATIVE: ABNORMAL

## 2020-11-19 ENCOUNTER — HOSPITAL ENCOUNTER (EMERGENCY)
Age: 45
Discharge: HOME | End: 2020-11-19
Payer: MEDICARE

## 2020-11-19 VITALS
BODY MASS INDEX: 35.36 KG/M2 | WEIGHT: 220 LBS | HEIGHT: 66 IN | TEMPERATURE: 98.4 F | SYSTOLIC BLOOD PRESSURE: 146 MMHG | RESPIRATION RATE: 17 BRPM | OXYGEN SATURATION: 94 % | DIASTOLIC BLOOD PRESSURE: 75 MMHG | HEART RATE: 92 BPM

## 2020-11-19 PROCEDURE — 99283 EMERGENCY DEPT VISIT LOW MDM: CPT

## 2020-11-19 PROCEDURE — 93005 ELECTROCARDIOGRAM TRACING: CPT | Performed by: PHYSICIAN ASSISTANT

## 2020-11-19 ASSESSMENT — ENCOUNTER SYMPTOMS
DIARRHEA: 0
SHORTNESS OF BREATH: 0
NAUSEA: 0
VOMITING: 0
ABDOMINAL PAIN: 0
COUGH: 0
RHINORRHEA: 0

## 2020-11-20 LAB
EKG ATRIAL RATE: 91 BPM
EKG DIAGNOSIS: NORMAL
EKG P AXIS: 37 DEGREES
EKG P-R INTERVAL: 154 MS
EKG Q-T INTERVAL: 400 MS
EKG QRS DURATION: 80 MS
EKG QTC CALCULATION (BAZETT): 492 MS
EKG R AXIS: 0 DEGREES
EKG T AXIS: 14 DEGREES
EKG VENTRICULAR RATE: 91 BPM

## 2020-11-20 PROCEDURE — 93010 ELECTROCARDIOGRAM REPORT: CPT | Performed by: INTERNAL MEDICINE

## 2020-11-20 NOTE — ED NOTES
Nursing Discharge Notes:  -Patient discharged at this time in no acute distress after verbalizing understanding of discharge instructions.  -A copy of the AVS was reviewed with pt.  -Pt received applicable scripts which were reviewed with pt by this RN. -Pt was given the opportunity to ask questions before signing for discharge.    -Pt left ambulatory to lobby / discharge area. Patient Education:  Learner - Patient. Motivation and Readiness To Learn - Medium to High  Barriers To Learning - None  Learning Preference / Provided Instructions - Both written and verbal discharge instructions.        Juan Alvarado RN  11/19/20 6264

## 2020-11-20 NOTE — ED PROVIDER NOTES
EKG reviewed myself. Dated today 80. Rate 91. Sinus rhythm. Left axis deviation. No change from 3/6/2019.      Latanya Concepcion MD  11/19/20 2109

## 2020-11-20 NOTE — ED PROVIDER NOTES
[facility]        Pt Name: Leyla Reynolds  MRN: [id number]  [place] 1975  Date of evaluation: 11/19/2020  Provider: Elda Muir PA-C  PCP: . None (Inactive)    EBER. I have evaluated this patient. My supervising physician was available for consultation. CHIEF COMPLAINT       Chief Complaint   Patient presents with    Drug Overdose     Pt found unconcious in hotel room bathroom after overdosing on heroin, Pt was dazed according to EMS, Pt received 2 MG of Narcan. Pt is now AxOx4, VSS. HISTORY OF PRESENT ILLNESS   (Location, Timing/Onset, Context/Setting, Quality, Duration, Modifying Factors, Severity, Associated Signs and Symptoms)  Note limiting factors. Leyla Reynolds is a 54 y.o. female presents to the emergency department today for evaluation for a drug overdose. The patient tells me that she relapsed, and she states that she injected heroin today. A bystander called EMS, the patient was breathing, but was not responsive, and 2 of Narcan was given. The patient is awake, walking, talking, and is alert and oriented x3. The patient denies any suicidal ideation or suicidal attempt. She denies any other drug use or alcohol use. She does smoke. The patient denies any chest pain or shortness of breath. She has no nausea or vomiting. No abdominal pain. Patient denies any cough or fever. The patient never needed CPR chest compressions. She states that she otherwise feels fine    Nursing Notes were all reviewed and agreed with or any disagreements were addressed in the HPI. REVIEW OF SYSTEMS    (2-9 systems for level 4, 10 or more for level 5)     Review of Systems   Constitutional: Negative for activity change, appetite change, chills and fever. HENT: Negative for congestion and rhinorrhea. Respiratory: Negative for cough and shortness of breath. Cardiovascular: Negative for chest pain. Gastrointestinal: Negative for abdominal pain, diarrhea, nausea and vomiting. Genitourinary: Negative for difficulty urinating, dysuria and hematuria. Positives and Pertinent negatives as per HPI. Except as noted above in the ROS, all other systems were reviewed and negative. PAST MEDICAL HISTORY     Past Medical History:   Diagnosis Date    Arthritis     Asthma          SURGICAL HISTORY   No past surgical history on file. CURRENTMEDICATIONS       Previous Medications    No medications on file         ALLERGIES     Darvocet a500 [propoxyphene n-acetaminophen] and Penicillins    FAMILYHISTORY       Family History   Problem Relation Age of Onset    Arthritis Other     Asthma Other     Cancer Other     Diabetes Other     High Blood Pressure Other           SOCIAL HISTORY       Social History     Tobacco Use    Smoking status: Current Every Day Smoker     Types: Cigarettes    Smokeless tobacco: Never Used   Substance Use Topics    Alcohol use: No    Drug use: No       SCREENINGS             PHYSICAL EXAM    (up to 7 for level 4, 8 or more for level 5)     ED Triage Vitals [11/19/20 1915]   BP Temp Temp Source Pulse Resp SpO2 Height Weight   (!) 154/84 98.4 °F (36.9 °C) Oral 95 18 93 % 5' 6\" (1.676 m) 220 lb (99.8 kg)       Physical Exam  Vitals signs and nursing note reviewed. Constitutional:       Appearance: She is well-developed. She is not diaphoretic. HENT:      Head: Normocephalic and atraumatic. Right Ear: External ear normal.      Left Ear: External ear normal.      Nose: Nose normal.   Eyes:      General:         Right eye: No discharge. Left eye: No discharge. Neck:      Musculoskeletal: Normal range of motion and neck supple. Trachea: No tracheal deviation. Cardiovascular:      Rate and Rhythm: Normal rate and regular rhythm. Heart sounds: No murmur. Pulmonary:      Effort: Pulmonary effort is normal. No respiratory distress.       Breath sounds: Normal breath sounds. No wheezing or rales. Musculoskeletal: Normal range of motion. Skin:     General: Skin is warm and dry. Neurological:      Mental Status: She is alert and oriented to person, place, and time. Psychiatric:         Behavior: Behavior normal.         DIAGNOSTIC RESULTS   LABS:    Labs Reviewed - No data to display    All other labs were within normal range or not returned as of this dictation. EKG: All EKG's are interpreted by the Emergency Department Physician in the absence of a cardiologist.  Please see their note for interpretation of EKG. RADIOLOGY:   Non-plain film images such as CT, Ultrasound and MRI are read by the radiologist. Plain radiographic images are visualized and preliminarily interpreted by the ED Provider with the below findings:        Interpretation per the Radiologist below, if available at the time of this note:    No orders to display     No results found. PROCEDURES   Unless otherwise noted below, none     Procedures    CRITICAL CARE TIME   N/A    CONSULTS:  None      EMERGENCY DEPARTMENT COURSE and DIFFERENTIAL DIAGNOSIS/MDM:   Vitals:    Vitals:    11/19/20 1915 11/19/20 1930   BP: (!) 154/84 (!) 146/75   Pulse: 95 92   Resp: 18 17   Temp: 98.4 °F (36.9 °C)    TempSrc: Oral    SpO2: 93% 94%   Weight: 220 lb (99.8 kg)    Height: 5' 6\" (1.676 m)        Patient was given the following medications:  Medications - No data to display        Briefly, this is a 79-year-old female who presents to the emergency department today for evaluation for a drug overdose. The patient does admit to snorting heroin, bystander called EMS. When EMS arrived, the patient was breathing, Narcan was given as she was having difficulty arousing. Patient now alert noted x3. Physical exam is unremarkable, patient is asymptomatic at this time. Vital signs are stable.     EKG documented by Dr. Darlene Moore, please see his note for further details    Patient has been observed for over 2 hours, she continues to be awake, alert, tolerating p.o. intake without any difficulty and I feel it can be managed as an outpatient. She was referred to [facility] for follow-up in 2 to 3 days for reevaluation. She is to return to the ED for any new or worsening symptoms. Table for discharge. Suspicion is low at this time for life-threatening arrhythmia, pleural effusion, pneumothorax, aspiration pneumonia or other emergent etiology    FINAL IMPRESSION      1.  Accidental overdose of heroin, initial encounter [facility])          DISPOSITION/PLAN   DISPOSITION Decision To Discharge 11/19/2020 09:12:14 PM      PATIENT REFERREDTO:  [facility]) Pre-Services  [phone]  Schedule an appointment as soon as possible for a visit in 2 days      [facility] Emergency Department  [address] [place] [address]  [phone]    As needed, If symptoms worsen      DISCHARGE MEDICATIONS:  New Prescriptions    No medications on file       DISCONTINUED MEDICATIONS:  Discontinued Medications    No medications on file              (Please note that portions of this note were completed with a voice recognition program.  Efforts were made to edit the dictations but occasionally words are mis-transcribed.)    Dieter Clinton PA-C (electronically signed)            Dieter Clinton PA-C  11/19/20 2779

## 2020-12-17 ENCOUNTER — APPOINTMENT (OUTPATIENT)
Dept: GENERAL RADIOLOGY | Age: 45
End: 2020-12-17
Payer: MEDICARE

## 2020-12-17 ENCOUNTER — HOSPITAL ENCOUNTER (EMERGENCY)
Age: 45
Discharge: HOME | End: 2020-12-17
Payer: MEDICARE

## 2020-12-17 VITALS
OXYGEN SATURATION: 100 % | TEMPERATURE: 98.4 F | SYSTOLIC BLOOD PRESSURE: 158 MMHG | DIASTOLIC BLOOD PRESSURE: 99 MMHG | HEART RATE: 93 BPM | WEIGHT: 220 LBS | BODY MASS INDEX: 35.36 KG/M2 | RESPIRATION RATE: 16 BRPM | HEIGHT: 66 IN

## 2020-12-17 LAB
A/G RATIO: 0.4 (ref 1.1–2.2)
ALBUMIN SERPL-MCNC: 2.6 G/DL (ref 3.4–5)
ALP BLD-CCNC: 213 U/L (ref 40–129)
ALT SERPL-CCNC: 28 U/L (ref 10–40)
ANION GAP SERPL CALCULATED.3IONS-SCNC: 6 MMOL/L (ref 3–16)
APTT: 32.6 SEC (ref 24.2–36.2)
AST SERPL-CCNC: 57 U/L (ref 15–37)
BASOPHILS ABSOLUTE: 0.1 K/UL (ref 0–0.2)
BASOPHILS RELATIVE PERCENT: 0.7 %
BILIRUB SERPL-MCNC: 0.5 MG/DL (ref 0–1)
BUN BLDV-MCNC: 9 MG/DL (ref 7–20)
CALCIUM SERPL-MCNC: 9.1 MG/DL (ref 8.3–10.6)
CHLORIDE BLD-SCNC: 101 MMOL/L (ref 99–110)
CO2: 29 MMOL/L (ref 21–32)
CREAT SERPL-MCNC: 0.6 MG/DL (ref 0.6–1.1)
D DIMER: 2085 NG/ML DDU (ref 0–229)
EOSINOPHILS ABSOLUTE: 0.3 K/UL (ref 0–0.6)
EOSINOPHILS RELATIVE PERCENT: 3.4 %
GFR AFRICAN AMERICAN: >60
GFR NON-AFRICAN AMERICAN: >60
GLOBULIN: 6 G/DL
GLUCOSE BLD-MCNC: 93 MG/DL (ref 70–99)
HCG QUALITATIVE: NEGATIVE
HCT VFR BLD CALC: 37.2 % (ref 36–48)
HEMOGLOBIN: 12.9 G/DL (ref 12–16)
INR BLD: 1.19 (ref 0.86–1.14)
LYMPHOCYTES ABSOLUTE: 2.6 K/UL (ref 1–5.1)
LYMPHOCYTES RELATIVE PERCENT: 28.9 %
MCH RBC QN AUTO: 34.1 PG (ref 26–34)
MCHC RBC AUTO-ENTMCNC: 34.7 G/DL (ref 31–36)
MCV RBC AUTO: 98.5 FL (ref 80–100)
MONOCYTES ABSOLUTE: 1 K/UL (ref 0–1.3)
MONOCYTES RELATIVE PERCENT: 11.3 %
NEUTROPHILS ABSOLUTE: 5.1 K/UL (ref 1.7–7.7)
NEUTROPHILS RELATIVE PERCENT: 55.7 %
PDW BLD-RTO: 15.2 % (ref 12.4–15.4)
PLATELET # BLD: 158 K/UL (ref 135–450)
PMV BLD AUTO: 8.8 FL (ref 5–10.5)
POTASSIUM REFLEX MAGNESIUM: 4.1 MMOL/L (ref 3.5–5.1)
PROTHROMBIN TIME: 13.8 SEC (ref 10–13.2)
RBC # BLD: 3.78 M/UL (ref 4–5.2)
REASON FOR REJECTION: NORMAL
REJECTED TEST: NORMAL
SODIUM BLD-SCNC: 136 MMOL/L (ref 136–145)
TOTAL CK: 77 U/L (ref 26–192)
TOTAL PROTEIN: 8.6 G/DL (ref 6.4–8.2)
URIC ACID, SERUM: 2.7 MG/DL (ref 2.6–6)
WBC # BLD: 9.1 K/UL (ref 4–11)

## 2020-12-17 PROCEDURE — 85025 COMPLETE CBC W/AUTO DIFF WBC: CPT

## 2020-12-17 PROCEDURE — 84550 ASSAY OF BLOOD/URIC ACID: CPT

## 2020-12-17 PROCEDURE — 99283 EMERGENCY DEPT VISIT LOW MDM: CPT

## 2020-12-17 PROCEDURE — 82550 ASSAY OF CK (CPK): CPT

## 2020-12-17 PROCEDURE — 84703 CHORIONIC GONADOTROPIN ASSAY: CPT

## 2020-12-17 PROCEDURE — 73562 X-RAY EXAM OF KNEE 3: CPT

## 2020-12-17 PROCEDURE — 6360000002 HC RX W HCPCS: Performed by: EMERGENCY MEDICINE

## 2020-12-17 PROCEDURE — 85379 FIBRIN DEGRADATION QUANT: CPT

## 2020-12-17 PROCEDURE — 80053 COMPREHEN METABOLIC PANEL: CPT

## 2020-12-17 PROCEDURE — 6370000000 HC RX 637 (ALT 250 FOR IP): Performed by: PHYSICIAN ASSISTANT

## 2020-12-17 PROCEDURE — 6360000002 HC RX W HCPCS: Performed by: PHYSICIAN ASSISTANT

## 2020-12-17 PROCEDURE — 96372 THER/PROPH/DIAG INJ SC/IM: CPT

## 2020-12-17 PROCEDURE — 85610 PROTHROMBIN TIME: CPT

## 2020-12-17 PROCEDURE — 85730 THROMBOPLASTIN TIME PARTIAL: CPT

## 2020-12-17 RX ORDER — MORPHINE SULFATE 4 MG/ML
4 INJECTION, SOLUTION INTRAMUSCULAR; INTRAVENOUS ONCE
Status: COMPLETED | OUTPATIENT
Start: 2020-12-17 | End: 2020-12-17

## 2020-12-17 RX ORDER — HYDROCODONE BITARTRATE AND ACETAMINOPHEN 7.5; 325 MG/1; MG/1
1 TABLET ORAL EVERY 6 HOURS PRN
Qty: 10 TABLET | Refills: 0 | Status: SHIPPED | OUTPATIENT
Start: 2020-12-17 | End: 2020-12-22

## 2020-12-17 RX ORDER — HYDROCODONE BITARTRATE AND ACETAMINOPHEN 5; 325 MG/1; MG/1
2 TABLET ORAL ONCE
Status: COMPLETED | OUTPATIENT
Start: 2020-12-17 | End: 2020-12-17

## 2020-12-17 RX ORDER — ONDANSETRON 4 MG/1
8 TABLET, ORALLY DISINTEGRATING ORAL ONCE
Status: DISCONTINUED | OUTPATIENT
Start: 2020-12-17 | End: 2020-12-18 | Stop reason: HOSPADM

## 2020-12-17 RX ORDER — KETOROLAC TROMETHAMINE 30 MG/ML
30 INJECTION, SOLUTION INTRAMUSCULAR; INTRAVENOUS ONCE
Status: COMPLETED | OUTPATIENT
Start: 2020-12-17 | End: 2020-12-17

## 2020-12-17 RX ADMIN — MORPHINE SULFATE 4 MG: 4 INJECTION, SOLUTION INTRAMUSCULAR; INTRAVENOUS at 22:11

## 2020-12-17 RX ADMIN — KETOROLAC TROMETHAMINE 30 MG: 30 INJECTION, SOLUTION INTRAMUSCULAR at 20:16

## 2020-12-17 RX ADMIN — ENOXAPARIN SODIUM 100 MG: 100 INJECTION SUBCUTANEOUS at 22:11

## 2020-12-17 RX ADMIN — HYDROCODONE BITARTRATE AND ACETAMINOPHEN 2 TABLET: 5; 325 TABLET ORAL at 20:16

## 2020-12-17 ASSESSMENT — ENCOUNTER SYMPTOMS
DIARRHEA: 0
RESPIRATORY NEGATIVE: 1
COUGH: 0
ABDOMINAL PAIN: 0
VOMITING: 0
SHORTNESS OF BREATH: 0
COLOR CHANGE: 0
BACK PAIN: 0
CHEST TIGHTNESS: 0
NAUSEA: 0
CONSTIPATION: 0

## 2020-12-17 ASSESSMENT — PAIN SCALES - GENERAL
PAINLEVEL_OUTOF10: 8

## 2020-12-17 ASSESSMENT — PAIN DESCRIPTION - ORIENTATION: ORIENTATION: LEFT

## 2020-12-17 ASSESSMENT — PAIN DESCRIPTION - LOCATION: LOCATION: KNEE

## 2020-12-17 ASSESSMENT — PAIN DESCRIPTION - PAIN TYPE: TYPE: ACUTE PAIN

## 2020-12-18 NOTE — ED PROVIDER NOTES
This patient was seen by the Mid-Level Provider. I have seen and examined the patient, agree with the workup, evaluation, management and diagnosis. Care plan has been discussed. My assessment reveals a 50-year-old female who presents with left sided knee pain. The patient's had the pain for the last 2 weeks. She denies any history of trauma. The patient does have history of gout and on that side. Radiology results:    XR KNEE LEFT (3 VIEWS)   Final Result   Joint effusion, unknown etiology. No acute fracture. Moderate osteoarthritis. VL Extremity Venous Left    (Results Pending)         LABS:    Labs Reviewed   CBC WITH AUTO DIFFERENTIAL - Abnormal; Notable for the following components:       Result Value    RBC 3.78 (*)     MCH 34.1 (*)     All other components within normal limits    Narrative:     Performed at:  [facility] [address],  [facility], [address]   Phone [phone]   COMPREHENSIVE METABOLIC PANEL W/ REFLEX TO MG FOR LOW K - Abnormal; Notable for the following components:     Total Protein 8.6 (*)     Alb 2.6 (*)     Albumin/Globulin Ratio 0.4 (*)     Alkaline Phosphatase 213 (*)     AST 57 (*)     All other components within normal limits    Narrative:     Performed at:  [facility] [address],  [facility], [address]   Phone [phone]   PROTIME-INR - Abnormal; Notable for the following components:    Protime 13.8 (*)     INR 1.19 (*)     All other components within normal limits    Narrative:     Performed at:  [facility] [address],  [facility], [facility] [address]   Phone [phone]   D-DIMER, QUANTITATIVE - Abnormal; Notable for the following components:    D-Dimer, Quant 2085 (*)     All other components within normal limits    Narrative:     Performed at:  [facility] [address],  [facility], [facility] [address]   Phone [phone]   APTT    Narrative:     Performed at:  [facility]  [address] [facility], [address]   Phone [phone]   HCG, SERUM, QUALITATIVE    Narrative:     Performed at:  [facility]  [address] [facility], [address]   Phone [phone]   CK    Narrative:     Performed at:  [facility] [address], [facility], [address]   Phone [phone]   URIC ACID    Narrative:     Performed at:  [facility] [address], [facility], [address]   Phone [phone]   SPECIMEN REJECTION    Narrative:     Chiquis Roman. [phone],  Coag results called to and read back by [facility], 2020 19:43, by  Vee Fernandez  Performed at:  [facility] [address], [facility], [address]   Phone [phone]               Exam:    Well-nourished female no acute distress. Examination of the knee shows no swelling, no redness, no warmth. She could extend and flex it. It was painful. Medical decision makin-year-old female presents with left-sided knee pain for the last several weeks. The patient's work-up included an x-ray of her knee that shows an effusion that could be causing her pain. However, the patient also has an elevated screening D-dimer. Because of the pain and the elevated D-dimer we have ordered an ultrasound tomorrow that is pending. The patient was discharged with some Norco for pain control. I gave her orthopedic surgery referral because of her knee joint effusion however, I do not feel this is infected based on my exam.  And finally the patient was given subcu dose of Lovenox until her ultrasound tomorrow. She is to return if worse. FINAL IMPRESSION:    1. Knee effusion, left    2.  Acute pain of left knee            Edward Yancey MD  20 [id number]

## 2020-12-18 NOTE — ED PROVIDER NOTES
[facility]        Pt Name: Yesi Flores  MRN: [id number]  [place] 1975  Date of evaluation: 12/17/2020  Provider: JESSI Monroy  PCP: . None (Inactive)     I have seen and evaluated this patient with my supervising physician Claudine       Chief Complaint   Patient presents with    Knee Pain     c/o left knee pain x 2 weeks. history of gout in left foot        HISTORY OF PRESENT ILLNESS   (Location, Timing/Onset, Context/Setting, Quality, Duration, Modifying Factors, Severity, Associated Signs and Symptoms)  Note limiting factors. Yesi Flores is a 54 y.o. female with past medical history of arthritis and asthma who presents the ED with complaint of left leg pain. Patient complaint of left knee pain that is been present for the past 2 weeks but states she now has swelling to her left knee and thigh. Patient states has history of gout to the left foot and concerned especially could be gout. Denies history of DVT or PE. Patient denies any injury, trauma or surgery. Patient denies any falls. Patient denies any blood thinning medication usage. Patient states sharp pain rated 8/10 to the left knee/leg worsened with movement and attempted ambulation. Has had to walk with a cane. Patient states associated edema but denies any ecchymosis, erythema or warmth. Denies fever chills. Denies numbness or tingling. Denies abrasion or laceration. Became concerned and came to the ED for further evaluation and treatment. Denies taking any over-the-counter medication prior to arrival.  Denies chest pain or shortness of breath. Nursing Notes were all reviewed and agreed with or any disagreements were addressed in the HPI.     REVIEW OF SYSTEMS    (2-9 systems for level 4, 10 or more for level 5)     Review of Systems   Constitutional: Negative for activity change, appetite change, chills, diaphoresis, fatigue and fever. Respiratory: Negative. Negative for cough, chest tightness and shortness of breath. Cardiovascular: Positive for leg swelling. Negative for chest pain and palpitations. Gastrointestinal: Negative for abdominal pain, constipation, diarrhea, nausea and vomiting. Genitourinary: Negative for decreased urine volume, difficulty urinating, dysuria, flank pain, frequency, hematuria and urgency. Musculoskeletal: Positive for arthralgias, gait problem, joint swelling and myalgias. Negative for back pain, neck pain and neck stiffness. Skin: Negative for color change, pallor, rash and wound. Neurological: Negative for dizziness, weakness, light-headedness, numbness and headaches. Positives and Pertinent negatives as per HPI. Except as noted above in the ROS, all other systems were reviewed and negative. PAST MEDICAL HISTORY     Past Medical History:   Diagnosis Date    Arthritis     Asthma          SURGICAL HISTORY   History reviewed. No pertinent surgical history. [id number]       Discharge Medication List as of 12/17/2020 10:20 PM            ALLERGIES     Darvocet a500 [propoxyphene n-acetaminophen] and Penicillins    FAMILYHISTORY       Family History   Problem Relation Age of Onset    Arthritis Other     Asthma Other     Cancer Other     Diabetes Other     High Blood Pressure Other           SOCIAL HISTORY       Social History     Tobacco Use    Smoking status: Current Every Day Smoker     Types: Cigarettes    Smokeless tobacco: Never Used   Substance Use Topics    Alcohol use: No    Drug use: No       SCREENINGS             PHYSICAL EXAM    (up to 7 for level 4, 8 or more for level 5)     ED Triage Vitals [12/17/20 1722]   BP Temp Temp Source Pulse Resp SpO2 Height Weight   (!) 158/99 98.4 °F (36.9 °C) Oral 93 16 100 % 5' 6\" (1.676 m) 220 lb (99.8 kg)       Physical Exam  Constitutional:       Appearance: She is well-developed.  She is not diaphoretic. HENT:      Head: Normocephalic and atraumatic. Right Ear: External ear normal.      Left Ear: External ear normal.   Eyes:      General:         Right eye: No discharge. Left eye: No discharge. Neck:      Musculoskeletal: Normal range of motion and neck supple. Cardiovascular:      Rate and Rhythm: Normal rate and regular rhythm. Pulses: Normal pulses. Heart sounds: Normal heart sounds. No murmur. No friction rub. No gallop. Pulmonary:      Effort: Pulmonary effort is normal. No respiratory distress. Breath sounds: Normal breath sounds. No stridor. No wheezing, rhonchi or rales. Chest:      Chest wall: No tenderness. Abdominal:      General: Abdomen is flat. There is no distension. Palpations: There is no mass. Tenderness: There is no abdominal tenderness. There is no right CVA tenderness, left CVA tenderness, guarding or rebound. Hernia: No hernia is present. Musculoskeletal: Normal range of motion. Comments: Upon examination patient has had palpation of the left anterior knee. Edema noted to the knee and also to the thigh. There is no palpable cords. No calf tenderness. Negative Homans' sign. No popliteal fossa tenderness. No evidence of Baker's cyst.  Full range of motion and strength associated pain. No ligamentous or meniscal stability noted. Extensor mechanism intact. No ecchymosis, erythema or warmth. No abrasion or laceration. No rashes or lesions. Dorsalis pedis pulse and capillary refill brisk. Sensation intact to the medial and lateral aspects of the distal toes. Achilles tendon intact. No bony tenderness over the hip or ankle. Gait deferred at this time. Skin:     General: Skin is warm and dry. Coloration: Skin is not pale. Findings: No erythema or rash. Neurological:      Mental Status: She is alert and oriented to person, place, and time.    Psychiatric:         Behavior: Behavior normal. DIAGNOSTIC RESULTS   LABS:    Labs Reviewed   CBC WITH AUTO DIFFERENTIAL - Abnormal; Notable for the following components:       Result Value    RBC 3.78 (*)     MCH 34.1 (*)     All other components within normal limits    Narrative:     Performed at:  [facility] [address], [place]   Phone [phone]   COMPREHENSIVE METABOLIC PANEL W/ REFLEX TO MG FOR LOW K - Abnormal; Notable for the following components:     Total Protein 8.6 (*)     Alb 2.6 (*)     Albumin/Globulin Ratio 0.4 (*)     Alkaline Phosphatase 213 (*)     AST 57 (*)     All other components within normal limits    Narrative:     Performed at:  [facility] [address], [place]   Phone [phone]   PROTIME-INR - Abnormal; Notable for the following components:    Protime 13.8 (*)     INR 1.19 (*)     All other components within normal limits    Narrative:     Performed at:  [facility] [address], [place]   Phone [phone]   D-DIMER, QUANTITATIVE - Abnormal; Notable for the following components:    D-Dimer, Quant 2085 (*)     All other components within normal limits    Narrative:     Performed at:  [facility] [address], [place]   Phone [phone]   APTT    Narrative:     Performed at:  [facility] [address], [place]   Phone [phone]   HCG, SERUM, QUALITATIVE    Narrative:     Performed at:  [facility] [address], [place]   Phone [phone]   CK    Narrative:     Performed at:  [facility] [address], [place]   Phone [phone]   URIC ACID    Narrative:     Performed at:  [facility] [address], [place]   Phone [phone]    Narrative:     Giovanny Andrea  SFERF tel. [phone],  Coag results called to and read back by [facility] VARGAS, 12/17/2020 19:43, by  Serena Ferraro  Performed at:  [facility] [address], [place], [address]   Phone [phone]       All other labs were within normal range or not returned as of this dictation. EKG: All EKG's are interpreted by the Emergency Department Physician in the absence of a cardiologist.  Please see their note for interpretation of EKG. RADIOLOGY:   Non-plain film images such as CT, Ultrasound and MRI are read by the radiologist. Plain radiographic images are visualized and preliminarily interpreted by the ED Provider with the below findings:        Interpretation per the Radiologist below, if available at the time of this note:    XR KNEE LEFT (3 VIEWS)   Final Result   Joint effusion, unknown etiology. No acute fracture. Moderate osteoarthritis. VL Extremity Venous Left    (Results Pending)     Xr Knee Left (3 Views)    Result Date: 12/17/2020  EXAMINATION: THREE XRAY VIEWS OF THE LEFT KNEE 12/17/2020 7:04 pm COMPARISON: 10/16/2013 HISTORY: ORDERING SYSTEM PROVIDED HISTORY: pain TECHNOLOGIST PROVIDED HISTORY: Reason for exam:->pain Reason for Exam: pain Acuity: Acute Type of Exam: Initial FINDINGS: A joint effusion is present. There is tricompartmental osteophyte formation PA no fracture is appreciated. Joint effusion, unknown etiology. No acute fracture. Moderate osteoarthritis.            PROCEDURES   Unless otherwise noted below, none     Procedures    CRITICAL CARE TIME   N/A    CONSULTS:  None      EMERGENCY DEPARTMENT COURSE and DIFFERENTIAL DIAGNOSIS/MDM:   Vitals:    Vitals:    12/17/20 1722   BP: (!) 158/99   Pulse: 93   Resp: 16   Temp: 98.4 °F (36.9 °C)   TempSrc: Oral   SpO2: 100%   Weight: 220 lb (99.8 kg)   Height: 5' 6\" (1.676 m)       Patient was given the following medications:  Medications   HYDROcodone-acetaminophen (NORCO) 5-325 MG per tablet 2 tablet (2 tablets Oral Given 12/17/20 2016)   ketorolac (TORADOL) injection 30 mg (30 mg Intramuscular Given 12/17/20 2016)   morphine injection 4 mg (4 mg Intramuscular Given 12/17/20 2211)   enoxaparin (LOVENOX) injection 100 mg (100 mg Subcutaneous Given 12/17/20 2211)           Patient is a 58-year-old female who presents to the ED with mostly left knee pain but also complaint of pain that radiates up into her thigh. Does have some swelling noted to the left knee and what appears to be the left distal thigh. Patient is concern for gout. Clinically does not appear to be gout. Full range of motion and strength. Distal neurovascular intact. Gait deferred. Patient does not appear to be suffering from septic arthritis. Patient was given pain medication and Toradol injection here in the ED. Unfortunately Doppler ultrasound not available at this time given after hours and will obtain blood work for further stratification for potential DVT given patient's entomology here in the ED. CBC showed normal white count, hemoglobin and platelets. CMP relatively unremarkable. INR 1.19. PTT 32.2. Pregnancy negative. CK normal.  Uric acid normal.  X-ray showed knee effusion on the left with some osteoarthritis but no evidence of fracture. Patient had D-dimer ordered for further risk ratification given patient symptomology and concern for potential DVT. Unfortunately D-dimer clotted and unable to be run. Had to be redrawn. Unfortunately this is at the end of my shift. Will sign out to attending provider. Please see attending provider note for further disposition and treatment of patient. Patient pending completion of D-dimer for further evaluation. D-dimer came back elevated and patient was given Lovenox here in the ED and order for outpatient Doppler. FINAL IMPRESSION      1. Knee effusion, left    2.  Acute pain of left

## 2021-01-14 ENCOUNTER — HOSPITAL ENCOUNTER (OUTPATIENT)
Dept: MRI IMAGING | Age: 46
Discharge: HOME | End: 2021-01-14
Payer: MEDICARE

## 2021-01-14 ENCOUNTER — HOSPITAL ENCOUNTER (OUTPATIENT)
Dept: VASCULAR LAB | Age: 46
Discharge: HOME | End: 2021-01-14
Payer: MEDICARE

## 2021-01-14 DIAGNOSIS — M25.562 ACUTE PAIN OF LEFT KNEE: ICD-10-CM

## 2021-01-14 DIAGNOSIS — M25.462 EFFUSION OF LEFT KNEE: ICD-10-CM

## 2021-01-14 PROCEDURE — 93971 EXTREMITY STUDY: CPT

## 2021-01-14 PROCEDURE — 73721 MRI JNT OF LWR EXTRE W/O DYE: CPT

## 2022-05-10 ENCOUNTER — NURSE TRIAGE (OUTPATIENT)
Dept: OTHER | Facility: CLINIC | Age: 47
End: 2022-05-10

## 2022-05-10 NOTE — TELEPHONE ENCOUNTER
Received call from [facility] at [facility]; Patient with Red Flag Complaint requesting to establish care with new PCP. Subjective: Caller states \"I know I need kidney replacements ok. I have arthritis severely in them. I can walk, but I have to use a cane. My feet and legs the bottom part they would swell. They would stay swollen for maybe a week, go away and not come back for awhile. Now it's been almost a couple months. I can;t even wear my shoes now. I also pee every 1-2 hours 24/7 day and night. \"     Current Symptoms: swelling in feet and legs to knees intermittent x a few years- worsening in the last couple months, increase urination, urinary urgency, NO pain with urinating, NO chest pain, knee pain- chronic, but worsening, wheezing at times-NOT new, feet are red/white in color, No difficulty breathing now    Hx of Asthma-no inhaler  Hx of Severe Arthritis in knees  Hx of Hep B and C    Onset: 2 months ago; worsening    Associated Symptoms: reduced activity-difficult to put on shoes and walk, increased urination    Pain Severity: 8/10; aching/cracking/givving out feeling; constant-worsens with walking    Temperature: Denies    What has been tried: Cane, Ice, Aleve, Ibuprofen, Tylenol- NOT helping    LMP: NA Pregnant: No    Recommended disposition: Go to ED Now. Patient states she can have someone drive her there shortly. Patient still requesting to set up new patient appointment to establish care as well. Care advice provided, patient verbalizes understanding; denies any other questions or concerns; instructed to call back for any new or worsening symptoms. Patient/caller agrees to proceed to [facility]. Writer provided warm transfer to [facility] for appointment scheduled        Attention Provider: Thank you for allowing me to participate in the care of your patient. The patient was connected to triage in response to information provided to the [facility]. Please do not respond through this encounter as the response is not directed to a shared pool.     Reason for Disposition   Can't walk or can barely stand (new-onset)    Protocols used: LEG SWELLING AND EDEMA-ADULT-OH

## 2022-05-17 ENCOUNTER — OFFICE VISIT (OUTPATIENT)
Dept: INTERNAL MEDICINE CLINIC | Age: 47
End: 2022-05-17
Payer: MEDICARE

## 2022-05-17 VITALS
WEIGHT: 293 LBS | SYSTOLIC BLOOD PRESSURE: 130 MMHG | HEART RATE: 92 BPM | OXYGEN SATURATION: 99 % | DIASTOLIC BLOOD PRESSURE: 82 MMHG | BODY MASS INDEX: 47.09 KG/M2 | HEIGHT: 66 IN

## 2022-05-17 DIAGNOSIS — G90.50 RSD (REFLEX SYMPATHETIC DYSTROPHY): ICD-10-CM

## 2022-05-17 DIAGNOSIS — R06.09 DYSPNEA ON EXERTION: ICD-10-CM

## 2022-05-17 DIAGNOSIS — M17.11 PRIMARY OSTEOARTHRITIS OF RIGHT KNEE: ICD-10-CM

## 2022-05-17 DIAGNOSIS — D69.6 THROMBOCYTOPENIA (HCC): ICD-10-CM

## 2022-05-17 DIAGNOSIS — M17.12 PRIMARY OSTEOARTHRITIS OF LEFT KNEE: ICD-10-CM

## 2022-05-17 DIAGNOSIS — Z13.220 SCREENING FOR CHOLESTEROL LEVEL: ICD-10-CM

## 2022-05-17 DIAGNOSIS — R60.0 LEG EDEMA: Primary | ICD-10-CM

## 2022-05-17 DIAGNOSIS — I87.2 VENOUS STASIS DERMATITIS OF BOTH LOWER EXTREMITIES: ICD-10-CM

## 2022-05-17 DIAGNOSIS — Z72.0 TOBACCO ABUSE: ICD-10-CM

## 2022-05-17 DIAGNOSIS — I27.20 PULMONARY HYPERTENSION (HCC): ICD-10-CM

## 2022-05-17 DIAGNOSIS — F32.0 CURRENT MILD EPISODE OF MAJOR DEPRESSIVE DISORDER WITHOUT PRIOR EPISODE (HCC): ICD-10-CM

## 2022-05-17 PROBLEM — L03.90 CELLULITIS: Status: RESOLVED | Noted: 2019-04-05 | Resolved: 2022-05-17

## 2022-05-17 PROBLEM — F41.9 ANXIETY: Status: ACTIVE | Noted: 2020-11-16

## 2022-05-17 PROBLEM — F11.20 OPIOID DEPENDENCE (HCC): Status: ACTIVE | Noted: 2020-11-16

## 2022-05-17 PROBLEM — G89.29 CHRONIC BACK PAIN: Status: ACTIVE | Noted: 2022-01-01

## 2022-05-17 PROBLEM — M54.9 CHRONIC BACK PAIN: Status: ACTIVE | Noted: 2022-05-17

## 2022-05-17 PROBLEM — F15.20 METHAMPHETAMINE DEPENDENCE (HCC): Status: ACTIVE | Noted: 2020-11-16

## 2022-05-17 PROBLEM — B18.2 CHRONIC HEPATITIS C (HCC): Status: ACTIVE | Noted: 2020-11-23

## 2022-05-17 PROBLEM — B18.1 CHRONIC TYPE B VIRAL HEPATITIS (HCC): Status: ACTIVE | Noted: 2020-11-23

## 2022-05-17 LAB
A/G RATIO: 0.7 (ref 1.1–2.2)
ALBUMIN SERPL-MCNC: 2.9 G/DL (ref 3.4–5)
ALP BLD-CCNC: 254 U/L (ref 40–129)
ALT SERPL-CCNC: 20 U/L (ref 10–40)
ANION GAP SERPL CALCULATED.3IONS-SCNC: 11 MMOL/L (ref 3–16)
AST SERPL-CCNC: 44 U/L (ref 15–37)
BASOPHILS ABSOLUTE: 0.1 K/UL (ref 0–0.2)
BASOPHILS RELATIVE PERCENT: 1 %
BILIRUB SERPL-MCNC: 0.4 MG/DL (ref 0–1)
BUN BLDV-MCNC: 9 MG/DL (ref 7–20)
CALCIUM SERPL-MCNC: 8.5 MG/DL (ref 8.3–10.6)
CHLORIDE BLD-SCNC: 105 MMOL/L (ref 99–110)
CHOLESTEROL, TOTAL: 130 MG/DL (ref 0–199)
CO2: 27 MMOL/L (ref 21–32)
CREAT SERPL-MCNC: 0.7 MG/DL (ref 0.6–1.1)
EOSINOPHILS ABSOLUTE: 0.4 K/UL (ref 0–0.6)
EOSINOPHILS RELATIVE PERCENT: 7.3 %
GFR AFRICAN AMERICAN: >60
GFR NON-AFRICAN AMERICAN: >60
GLUCOSE BLD-MCNC: 114 MG/DL (ref 70–99)
HCT VFR BLD CALC: 39.5 % (ref 36–48)
HDLC SERPL-MCNC: 46 MG/DL (ref 40–60)
HEMOGLOBIN: 13.5 G/DL (ref 12–16)
LDL CHOLESTEROL CALCULATED: 65 MG/DL
LYMPHOCYTES ABSOLUTE: 2.3 K/UL (ref 1–5.1)
LYMPHOCYTES RELATIVE PERCENT: 39.2 %
MCH RBC QN AUTO: 33.6 PG (ref 26–34)
MCHC RBC AUTO-ENTMCNC: 34.3 G/DL (ref 31–36)
MCV RBC AUTO: 98 FL (ref 80–100)
MONOCYTES ABSOLUTE: 0.6 K/UL (ref 0–1.3)
MONOCYTES RELATIVE PERCENT: 9.8 %
NEUTROPHILS ABSOLUTE: 2.5 K/UL (ref 1.7–7.7)
NEUTROPHILS RELATIVE PERCENT: 42.7 %
PDW BLD-RTO: 15.3 % (ref 12.4–15.4)
PLATELET # BLD: 89 K/UL (ref 135–450)
PLATELET SLIDE REVIEW: ABNORMAL
PMV BLD AUTO: 9.5 FL (ref 5–10.5)
POTASSIUM SERPL-SCNC: 3.8 MMOL/L (ref 3.5–5.1)
RBC # BLD: 4.04 M/UL (ref 4–5.2)
RBC # BLD: NORMAL 10*6/UL
SODIUM BLD-SCNC: 143 MMOL/L (ref 136–145)
TOTAL PROTEIN: 7.3 G/DL (ref 6.4–8.2)
TRIGL SERPL-MCNC: 95 MG/DL (ref 0–150)
VLDLC SERPL CALC-MCNC: 19 MG/DL
WBC # BLD: 5.9 K/UL (ref 4–11)

## 2022-05-17 PROCEDURE — 99204 OFFICE O/P NEW MOD 45 MIN: CPT | Performed by: INTERNAL MEDICINE

## 2022-05-17 RX ORDER — GABAPENTIN 100 MG/1
100 CAPSULE ORAL 3 TIMES DAILY
Qty: 90 CAPSULE | Refills: 0 | Status: SHIPPED | OUTPATIENT
Start: 2022-05-17 | End: 2022-06-03 | Stop reason: SDUPTHER

## 2022-05-17 RX ORDER — TRIAMCINOLONE ACETONIDE 5 MG/G
CREAM TOPICAL
Qty: 45 G | Refills: 1 | Status: SHIPPED | OUTPATIENT
Start: 2022-05-17 | End: 2022-05-24

## 2022-05-17 RX ORDER — VENLAFAXINE HYDROCHLORIDE 75 MG/1
75 CAPSULE, EXTENDED RELEASE ORAL DAILY
Qty: 30 CAPSULE | Refills: 2 | Status: SHIPPED | OUTPATIENT
Start: 2022-05-17 | End: 2022-06-03 | Stop reason: SDUPTHER

## 2022-05-17 RX ORDER — FUROSEMIDE 20 MG/1
20 TABLET ORAL DAILY
Qty: 7 TABLET | Refills: 0 | Status: SHIPPED | OUTPATIENT
Start: 2022-05-17 | End: 2022-06-03 | Stop reason: SDUPTHER

## 2022-05-17 SDOH — ECONOMIC STABILITY: FOOD INSECURITY: WITHIN THE PAST 12 MONTHS, YOU WORRIED THAT YOUR FOOD WOULD RUN OUT BEFORE YOU GOT MONEY TO BUY MORE.: NEVER TRUE

## 2022-05-17 SDOH — ECONOMIC STABILITY: FOOD INSECURITY: WITHIN THE PAST 12 MONTHS, THE FOOD YOU BOUGHT JUST DIDN'T LAST AND YOU DIDN'T HAVE MONEY TO GET MORE.: NEVER TRUE

## 2022-05-17 ASSESSMENT — ENCOUNTER SYMPTOMS
CONSTIPATION: 0
ABDOMINAL PAIN: 0
SHORTNESS OF BREATH: 0
COUGH: 0
WHEEZING: 0
CHEST TIGHTNESS: 0
SINUS PAIN: 0
COLOR CHANGE: 0
BLOOD IN STOOL: 0
RECTAL PAIN: 0
NAUSEA: 0
DIARRHEA: 0
ABDOMINAL DISTENTION: 0
ANAL BLEEDING: 0

## 2022-05-17 ASSESSMENT — PATIENT HEALTH QUESTIONNAIRE - PHQ9
8. MOVING OR SPEAKING SO SLOWLY THAT OTHER PEOPLE COULD HAVE NOTICED. OR THE OPPOSITE, BEING SO FIGETY OR RESTLESS THAT YOU HAVE BEEN MOVING AROUND A LOT MORE THAN USUAL: 3
SUM OF ALL RESPONSES TO PHQ QUESTIONS 1-9: 22
7. TROUBLE CONCENTRATING ON THINGS, SUCH AS READING THE NEWSPAPER OR WATCHING TELEVISION: 3
2. FEELING DOWN, DEPRESSED OR HOPELESS: 3
3. TROUBLE FALLING OR STAYING ASLEEP: 3
SUM OF ALL RESPONSES TO PHQ QUESTIONS 1-9: 22
6. FEELING BAD ABOUT YOURSELF - OR THAT YOU ARE A FAILURE OR HAVE LET YOURSELF OR YOUR FAMILY DOWN: 3
9. THOUGHTS THAT YOU WOULD BE BETTER OFF DEAD, OR OF HURTING YOURSELF: 0
SUM OF ALL RESPONSES TO PHQ9 QUESTIONS 1 & 2: 4
10. IF YOU CHECKED OFF ANY PROBLEMS, HOW DIFFICULT HAVE THESE PROBLEMS MADE IT FOR YOU TO DO YOUR WORK, TAKE CARE OF THINGS AT HOME, OR GET ALONG WITH OTHER PEOPLE: 3
4. FEELING TIRED OR HAVING LITTLE ENERGY: 3
SUM OF ALL RESPONSES TO PHQ QUESTIONS 1-9: 22
5. POOR APPETITE OR OVEREATING: 3
1. LITTLE INTEREST OR PLEASURE IN DOING THINGS: 1
SUM OF ALL RESPONSES TO PHQ QUESTIONS 1-9: 22

## 2022-05-17 ASSESSMENT — COLUMBIA-SUICIDE SEVERITY RATING SCALE - C-SSRS
2. HAVE YOU ACTUALLY HAD ANY THOUGHTS OF KILLING YOURSELF?: NO
6. HAVE YOU EVER DONE ANYTHING, STARTED TO DO ANYTHING, OR PREPARED TO DO ANYTHING TO END YOUR LIFE?: NO
1. WITHIN THE PAST MONTH, HAVE YOU WISHED YOU WERE DEAD OR WISHED YOU COULD GO TO SLEEP AND NOT WAKE UP?: NO

## 2022-05-17 ASSESSMENT — SOCIAL DETERMINANTS OF HEALTH (SDOH): HOW HARD IS IT FOR YOU TO PAY FOR THE VERY BASICS LIKE FOOD, HOUSING, MEDICAL CARE, AND HEATING?: VERY HARD

## 2022-05-17 NOTE — PROGRESS NOTES
Neri Silver (:  1975) is a 49 y.o. female,New patient, here for evaluation of the following chief complaint(s):  Leg Swelling (x1 year, c/o leg and feet edema), New Patient (says ortho says knee replacements), Leg Pain (knee pain wants two knee replacement.), Diabetes (c/o shaky sweaty nausea waking up in middle of night.), and Depression (hx with losing children)         ASSESSMENT/PLAN:  1. Leg edema  -     furosemide (LASIX) 20 MG tablet; Take 1 tablet by mouth daily, Disp-7 tablet, R-0Normal  2. Pulmonary hypertension (HCC)  -     TSH; Future  -     ECHO Complete 2D W Doppler W Color; Future  3. Tobacco abuse  4. Primary osteoarthritis of right knee  -     XR KNEE RIGHT (MIN 4 VIEWS); Future  -     diclofenac (VOLTAREN) 50 MG EC tablet; Take 1 tablet by mouth 3 times daily (with meals), Disp-60 tablet, R-3Normal  5. Primary osteoarthritis of left knee  -     XR KNEE LEFT (MIN 4 VIEWS); Future  -     diclofenac (VOLTAREN) 50 MG EC tablet; Take 1 tablet by mouth 3 times daily (with meals), Disp-60 tablet, R-3Normal  6. Thrombocytopenia (HCC)  -     CBC with Auto Differential; Future  -     Comprehensive Metabolic Panel; Future  7. RSD (reflex sympathetic dystrophy)  -     TSH; Future  -     gabapentin (NEURONTIN) 100 MG capsule; Take 1 capsule by mouth 3 times daily for 30 days. Intended supply: 30 days, Disp-90 capsule, R-0Normal  8. Venous stasis dermatitis of both lower extremities  -     triamcinolone (ARISTOCORT) 0.5 % cream; Apply topically 3 times daily. , Disp-45 g, R-1, Normal  9. Current mild episode of major depressive disorder without prior episode ([facility] [address]  10. Screening for cholesterol level  -     Hemoglobin A1C; Future  -     Lipid Panel; Future  11. Dyspnea on exertion   -     TSH;  Future  The patient has multiple issues that have to be addressed at this time we will get a start by treating symptomatically her status is dermatitis in both lower extremities with a short course of diuretics and topical cortisone but patient understand that also weight loss is currently a major role in this patient history is also very high risk for multiple issues like lung disease and liver disease so we will can obtain some blood work to see if those are contributing to it    Patient depression is very clear at this point we will get a start on medications and reassess her in the next 2 to 4 weeks. The patient has severe arthritic pains in her knees she has had MRI about a year and a half ago and to be seen by orthopedics and I think the patient will most likely require to have knee replacement surgery but will get a reassess it and start the process again    The patient had history of exercise related dyspnea which could be related to her weight but also cannot exclude lung disease or cardiac issues so an echo will be done for assessment    Patient has history of hepatitis B hepatitis C thrombocytopenia will get assessed all these issues and work with the patient on on them    The patient also is still actively smoking advised against doing that specially given the findings that we have so far    Return in about 2 weeks (around 5/31/2022).          Subjective   SUBJECTIVE/OBJECTIVE:    Lab Review   Lab Results   Component Value Date     12/17/2020     04/09/2019     04/07/2019    K 4.1 12/17/2020    K 3.6 04/09/2019    K 3.9 04/07/2019    K 4.2 04/06/2019    K 3.9 04/05/2019    CO2 29 12/17/2020    CO2 25 04/09/2019    CO2 23 04/07/2019    BUN 9 12/17/2020    BUN 11 04/09/2019    BUN 11 04/07/2019    CREATININE 0.6 12/17/2020    CREATININE 0.6 04/09/2019    CREATININE 0.6 04/07/2019    GLUCOSE 93 12/17/2020    GLUCOSE 95 04/09/2019    GLUCOSE 120 04/07/2019    CALCIUM 9.1 12/17/2020    CALCIUM 8.1 04/09/2019    CALCIUM 7.7 04/07/2019     Lab Results   Component Value Date    WBC 9.1 12/17/2020    WBC 4.3 04/09/2019    WBC 4.2 04/08/2019    HGB 12.9 12/17/2020    HGB 12.3 04/09/2019 HGB 11.3 04/08/2019    HCT 37.2 12/17/2020    HCT 35.8 04/09/2019    HCT 33.6 04/08/2019    MCV 98.5 12/17/2020    MCV 99.2 04/09/2019    .1 04/08/2019     12/17/2020    PLT 65 04/09/2019    PLT 60 04/08/2019     No results found for: CHOL, TRIG, HDL, LDLDIRECT    Vitals 5/17/2022 12/17/2020 70/05/1275   SYSTOLIC 423 - -   DIASTOLIC 82 - -   Pulse 92 - -   Temp - - -   Resp - - -   SpO2 99 - -   Weight 322 lb 12.8 oz - -   Height 5' 6\" - -   Body mass index 52.1 kg/m2 - -   Pain Level - 8 8   Some recent data might be hidden       Left lower extremity swelling for the last 2 weeks bilateral with some itching and mild redness    Patient has been having a lot of difficulty ambulation given her knee pain and had excessive weight gain that she had over the last year which she reports about 120 pounds  Patient is having also multiple issues that have to do with reflex optic dystrophy in her lower extremities she has been on medication for that in the past but she lost her physician about a year ago and has not been taking any medication    The patient is having a lot of symptoms relating to depression with mood liability feeling depressed decreased activity again gaining a lot of weight    Leg Pain   The incident occurred more than 1 week ago. Diabetes  Pertinent negatives for hypoglycemia include no headaches. Pertinent negatives for diabetes include no chest pain, no fatigue, no polyuria and no weakness. Review of Systems   Constitutional: Negative for activity change, appetite change, fatigue and unexpected weight change. HENT: Negative for congestion, ear pain and sinus pain. Respiratory: Negative for cough, chest tightness, shortness of breath and wheezing. Cardiovascular: Negative for chest pain, palpitations and leg swelling. Gastrointestinal: Negative for abdominal distention, abdominal pain, anal bleeding, blood in stool, constipation, diarrhea, nausea and rectal pain. Endocrine: Negative for cold intolerance, heat intolerance and polyuria. Genitourinary: Negative for dysuria, frequency and urgency. Musculoskeletal: Negative for arthralgias and myalgias. Skin: Negative for color change and rash. Neurological: Negative for weakness and headaches. Hematological: Negative for adenopathy. Does not bruise/bleed easily. Psychiatric/Behavioral: Negative for agitation, dysphoric mood and sleep disturbance. Objective   Physical Exam  Constitutional:       General: She is not in acute distress. Appearance: Normal appearance. HENT:      Head: Normocephalic and atraumatic. Right Ear: Tympanic membrane normal.      Left Ear: Tympanic membrane normal.      Nose: Nose normal.   Eyes:      Extraocular Movements: Extraocular movements intact. Conjunctiva/sclera: Conjunctivae normal.      Pupils: Pupils are equal, round, and reactive to light. Neck:      Vascular: No carotid bruit. Cardiovascular:      Rate and Rhythm: Normal rate and regular rhythm. Pulses: Normal pulses. Heart sounds: No murmur heard. Pulmonary:      Effort: Pulmonary effort is normal. No respiratory distress. Breath sounds: Normal breath sounds. Abdominal:      General: Abdomen is flat. Bowel sounds are normal. There is no distension. Palpations: Abdomen is soft. Tenderness: There is no abdominal tenderness. Musculoskeletal:         General: Swelling and tenderness present. No deformity. Cervical back: Normal range of motion and neck supple. No rigidity or tenderness. Lumbar back: Spasms and tenderness present. Right lower leg: Edema present. Left lower leg: Edema present. Lymphadenopathy:      Cervical: No cervical adenopathy. Skin:     Coloration: Skin is not jaundiced. Findings: Erythema and rash present. No bruising or lesion. Neurological:      General: No focal deficit present.       Mental Status: She is alert and

## 2022-05-18 LAB — TSH SERPL DL<=0.05 MIU/L-ACNC: 3.42 UIU/ML (ref 0.27–4.2)

## 2022-05-19 LAB
ESTIMATED AVERAGE GLUCOSE: 99.7 MG/DL
HBA1C MFR BLD: 5.1 %

## 2022-06-03 ENCOUNTER — OFFICE VISIT (OUTPATIENT)
Dept: INTERNAL MEDICINE CLINIC | Age: 47
End: 2022-06-03
Payer: MEDICARE

## 2022-06-03 VITALS
WEIGHT: 293 LBS | OXYGEN SATURATION: 99 % | SYSTOLIC BLOOD PRESSURE: 132 MMHG | DIASTOLIC BLOOD PRESSURE: 86 MMHG | HEART RATE: 99 BPM | BODY MASS INDEX: 47.84 KG/M2

## 2022-06-03 DIAGNOSIS — M25.562 PAIN IN BOTH KNEES, UNSPECIFIED CHRONICITY: ICD-10-CM

## 2022-06-03 DIAGNOSIS — I27.20 PULMONARY HYPERTENSION (HCC): Primary | ICD-10-CM

## 2022-06-03 DIAGNOSIS — J41.0 SIMPLE CHRONIC BRONCHITIS (HCC): ICD-10-CM

## 2022-06-03 DIAGNOSIS — R60.0 LEG EDEMA: ICD-10-CM

## 2022-06-03 DIAGNOSIS — D69.6 THROMBOCYTOPENIA (HCC): ICD-10-CM

## 2022-06-03 DIAGNOSIS — B17.10 ACUTE HEPATITIS C VIRUS INFECTION WITHOUT HEPATIC COMA: ICD-10-CM

## 2022-06-03 DIAGNOSIS — M25.561 PAIN IN BOTH KNEES, UNSPECIFIED CHRONICITY: ICD-10-CM

## 2022-06-03 DIAGNOSIS — G90.50 RSD (REFLEX SYMPATHETIC DYSTROPHY): ICD-10-CM

## 2022-06-03 DIAGNOSIS — I27.20 PULMONARY HYPERTENSION (HCC): ICD-10-CM

## 2022-06-03 DIAGNOSIS — F41.9 ANXIETY: ICD-10-CM

## 2022-06-03 PROBLEM — J44.9 COPD (CHRONIC OBSTRUCTIVE PULMONARY DISEASE) (HCC): Status: ACTIVE | Noted: 2022-06-03

## 2022-06-03 PROBLEM — L02.419 CELLULITIS AND ABSCESS OF LOWER EXTREMITY: Status: RESOLVED | Noted: 2019-04-05 | Resolved: 2022-06-03

## 2022-06-03 PROBLEM — L03.119 CELLULITIS AND ABSCESS OF LOWER EXTREMITY: Status: RESOLVED | Noted: 2019-04-05 | Resolved: 2022-06-03

## 2022-06-03 PROCEDURE — 99214 OFFICE O/P EST MOD 30 MIN: CPT | Performed by: INTERNAL MEDICINE

## 2022-06-03 RX ORDER — BUSPIRONE HYDROCHLORIDE 5 MG/1
5 TABLET ORAL 2 TIMES DAILY PRN
Qty: 60 TABLET | Refills: 0 | Status: SHIPPED | OUTPATIENT
Start: 2022-06-03 | End: 2022-07-01 | Stop reason: SDUPTHER

## 2022-06-03 RX ORDER — BUSPIRONE HYDROCHLORIDE 5 MG/1
5 TABLET ORAL 2 TIMES DAILY PRN
Qty: 60 TABLET | Refills: 0 | Status: SHIPPED | OUTPATIENT
Start: 2022-06-03 | End: 2022-06-03 | Stop reason: SDUPTHER

## 2022-06-03 RX ORDER — GABAPENTIN 300 MG/1
300 CAPSULE ORAL 3 TIMES DAILY
Qty: 90 CAPSULE | Refills: 0 | Status: SHIPPED | OUTPATIENT
Start: 2022-06-03 | End: 2022-06-03 | Stop reason: SDUPTHER

## 2022-06-03 RX ORDER — UMECLIDINIUM BROMIDE AND VILANTEROL TRIFENATATE 62.5; 25 UG/1; UG/1
1 POWDER RESPIRATORY (INHALATION) DAILY
Qty: 1 EACH | Refills: 2 | Status: SHIPPED | OUTPATIENT
Start: 2022-06-03

## 2022-06-03 RX ORDER — VENLAFAXINE HYDROCHLORIDE 150 MG/1
150 CAPSULE, EXTENDED RELEASE ORAL DAILY
Qty: 30 CAPSULE | Refills: 2 | Status: SHIPPED | OUTPATIENT
Start: 2022-06-03 | End: 2022-06-03 | Stop reason: SDUPTHER

## 2022-06-03 RX ORDER — VENLAFAXINE HYDROCHLORIDE 150 MG/1
150 CAPSULE, EXTENDED RELEASE ORAL DAILY
Qty: 30 CAPSULE | Refills: 2 | Status: SHIPPED | OUTPATIENT
Start: 2022-06-03 | End: 2022-09-01 | Stop reason: SDUPTHER

## 2022-06-03 RX ORDER — FUROSEMIDE 20 MG/1
20 TABLET ORAL DAILY
Qty: 7 TABLET | Refills: 0 | Status: SHIPPED | OUTPATIENT
Start: 2022-06-03 | End: 2022-08-04

## 2022-06-03 RX ORDER — UMECLIDINIUM BROMIDE AND VILANTEROL TRIFENATATE 62.5; 25 UG/1; UG/1
1 POWDER RESPIRATORY (INHALATION) DAILY
Qty: 1 EACH | Refills: 2 | Status: SHIPPED | OUTPATIENT
Start: 2022-06-03 | End: 2022-06-03 | Stop reason: SDUPTHER

## 2022-06-03 RX ORDER — FUROSEMIDE 20 MG/1
20 TABLET ORAL DAILY
Qty: 7 TABLET | Refills: 0 | Status: SHIPPED | OUTPATIENT
Start: 2022-06-03 | End: 2022-06-03 | Stop reason: SDUPTHER

## 2022-06-03 RX ORDER — GABAPENTIN 300 MG/1
300 CAPSULE ORAL 3 TIMES DAILY
Qty: 90 CAPSULE | Refills: 0 | Status: SHIPPED | OUTPATIENT
Start: 2022-06-03 | End: 2022-07-01 | Stop reason: SDUPTHER

## 2022-06-03 ASSESSMENT — ENCOUNTER SYMPTOMS
SPUTUM PRODUCTION: 0
FREQUENT THROAT CLEARING: 0
COUGH: 0
WHEEZING: 0
SHORTNESS OF BREATH: 1
DIFFICULTY BREATHING: 0
CHEST TIGHTNESS: 0

## 2022-06-03 ASSESSMENT — COPD QUESTIONNAIRES: COPD: 1

## 2022-06-03 NOTE — ASSESSMENT & PLAN NOTE
Patient having severe bilateral arthritis unfortunately she did not do her x-rays but I feel that she would need to be seen by orthopedics her MRI last year showed severe arthritis of the left knee

## 2022-06-03 NOTE — ASSESSMENT & PLAN NOTE
This most likely a result of her chronic hepatitis we will can refer the patient for evaluation for hepatitis treatment

## 2022-06-03 NOTE — ASSESSMENT & PLAN NOTE
With patient her symptomatology she is reporting that she has not had significant changes with the Effexor we will get a double the dose to 150 and add BuSpar on as-needed basis patient was asking to be given Xanax I did explain to patient that given her history that it would be very harmful for her to go on any habit-forming medication and we can have to avoid that I did offer her to see a psychiatrist or a psychologist but she declined at this stage

## 2022-06-03 NOTE — PROGRESS NOTES
Annie Colon (:  1975) is a 49 y.o. female,New patient, here for evaluation of the following chief complaint(s):  Orders (states echo, xrays and US at hospital didn't do orders given. [facility]) and Follow-up (states medications not working \"quickly\" trying to understand not to be that way.)         ASSESSMENT/PLAN:  1. Pulmonary hypertension ([address]  Assessment & Plan:  Fortunately patient did not do her echo I encouraged her strongly to do that for further assessment  Orders:  -     umeclidinium-vilanterol (ANORO ELLIPTA) 62.5-25 MCG/INH AEPB inhaler; Inhale 1 puff into the lungs daily, Disp-1 each, R-2Normal  2. Leg edema  -     furosemide (LASIX) 20 MG tablet; Take 1 tablet by mouth daily, Disp-7 tablet, R-0Normal  3. RSD (reflex sympathetic dystrophy)  Assessment & Plan: At this point we did not titrate the patient gabapentin up but I think a lot of her symptomatology had to do with her arthritis as well as her weight and neuropathy secondary to her hepatitis C  Orders:  -     gabapentin (NEURONTIN) 300 MG capsule; Take 1 capsule by mouth 3 times daily for 30 days. Intended supply: 30 days, Disp-90 capsule, R-0Normal  4. Acute hepatitis C virus infection without hepatic coma  -     AFL - Rudolfo Cogan, MD, Gastroenterology (ERCP & EUS), [facility]  -     Hep C AB RLFX HCV PCR-A; Future  5. Thrombocytopenia ([facility] [address]  Assessment & Plan: This most likely a result of her chronic hepatitis we will can refer the patient for evaluation for hepatitis treatment  6. Pain in both knees, unspecified chronicity  Assessment & Plan:  Patient having severe bilateral arthritis unfortunately she did not do her x-rays but I feel that she would need to be seen by orthopedics her MRI last year showed severe arthritis of the left knee  Orders:  -     Ambulatory referral to Orthopedic Surgery  7. Anxiety  Assessment & Plan:   With patient her symptomatology she is reporting that she has not had significant changes with the Effexor we will get a double the dose to 150 and add BuSpar on as-needed basis patient was asking to be given Xanax I did explain to patient that given her history that it would be very harmful for her to go on any habit-forming medication and we can have to avoid that I did offer her to see a psychiatrist or a psychologist but she declined at this stage  Orders:  -     venlafaxine (EFFEXOR XR) 150 MG extended release capsule; Take 1 capsule by mouth daily, Disp-30 capsule, R-2Normal  -     busPIRone (BUSPAR) 5 MG tablet; Take 1 tablet by mouth 2 times daily as needed (anxeity), Disp-60 tablet, R-0Normal  8. Simple chronic bronchitis (HCC)  Assessment & Plan:  Reviewed patient need to quit smoking at this point and given her shortness of breath and cor pulmonale think the patient needs to be on treatment to reduce her pulmonary resistance we will get a start the patient on treatment and reassess next visit for the time being I advised patient to use her Lasix for 1 more week to try and reduce edema she has lost quite a bit of weight in the last 2 weeks 26 pounds      Return in about 4 weeks (around 7/1/2022).          Subjective   SUBJECTIVE/OBJECTIVE:    Lab Review   Lab Results   Component Value Date     05/17/2022     12/17/2020     04/09/2019    K 3.8 05/17/2022    K 4.1 12/17/2020    K 3.6 04/09/2019    K 3.9 04/07/2019    K 4.2 04/06/2019    CO2 27 05/17/2022    CO2 29 12/17/2020    CO2 25 04/09/2019    BUN 9 05/17/2022    BUN 9 12/17/2020    BUN 11 04/09/2019    CREATININE 0.7 05/17/2022    CREATININE 0.6 12/17/2020    CREATININE 0.6 04/09/2019    GLUCOSE 114 05/17/2022    GLUCOSE 93 12/17/2020    GLUCOSE 95 04/09/2019    CALCIUM 8.5 05/17/2022    CALCIUM 9.1 12/17/2020    CALCIUM 8.1 04/09/2019     Lab Results   Component Value Date    WBC 5.9 05/17/2022    WBC 9.1 12/17/2020    WBC 4.3 04/09/2019    HGB 13.5 05/17/2022    HGB 12.9 12/17/2020    HGB 12.3 04/09/2019    HCT 39.5 05/17/2022    HCT 37.2 12/17/2020    HCT 35.8 04/09/2019    MCV 98.0 05/17/2022    MCV 98.5 12/17/2020    MCV 99.2 04/09/2019    PLT 89 05/17/2022     12/17/2020    PLT 65 04/09/2019     Lab Results   Component Value Date    CHOL 130 05/17/2022    TRIG 95 05/17/2022    HDL 46 05/17/2022       Vitals 6/3/2022 6/3/2022 7/53/9399   SYSTOLIC 029 603 317   DIASTOLIC 86 96 82   Site Right Upper Arm - -   Position Sitting - -   Pulse - 99 92   Temp - - -   Resp - - -   SpO2 - 99 99   Weight - 296 lb 6.4 oz 322 lb 12.8 oz   Height - - 5' 6\"   Body mass index - - 52.1 kg/m2   Pain Level - - -   Some recent data might be hidden       Knee Pain   The incident occurred more than 1 week ago. The pain is moderate. Pertinent negatives include no inability to bear weight, loss of motion, muscle weakness, numbness or tingling. COPD  She complains of shortness of breath. There is no chest tightness, cough, difficulty breathing, frequent throat clearing, sputum production or wheezing. Review of Systems   Respiratory: Positive for shortness of breath. Negative for cough, sputum production and wheezing. Neurological: Negative for tingling and numbness. Objective   Physical Exam  Vitals and nursing note reviewed. Constitutional:       General: She is not in acute distress. Appearance: Normal appearance. HENT:      Head: Normocephalic and atraumatic. Right Ear: Tympanic membrane normal.      Left Ear: Tympanic membrane normal.      Nose: Nose normal.   Eyes:      Extraocular Movements: Extraocular movements intact. Conjunctiva/sclera: Conjunctivae normal.      Pupils: Pupils are equal, round, and reactive to light. Neck:      Vascular: No carotid bruit. Cardiovascular:      Rate and Rhythm: Normal rate and regular rhythm. Pulses: Normal pulses. Heart sounds: No murmur heard. Pulmonary:      Effort: Pulmonary effort is normal. No respiratory distress.       Breath sounds:

## 2022-06-03 NOTE — ASSESSMENT & PLAN NOTE
Reviewed patient need to quit smoking at this point and given her shortness of breath and cor pulmonale think the patient needs to be on treatment to reduce her pulmonary resistance we will get a start the patient on treatment and reassess next visit for the time being I advised patient to use her Lasix for 1 more week to try and reduce edema she has lost quite a bit of weight in the last 2 weeks 26 pounds

## 2022-06-03 NOTE — ASSESSMENT & PLAN NOTE
At this point we did not titrate the patient gabapentin up but I think a lot of her symptomatology had to do with her arthritis as well as her weight and neuropathy secondary to her hepatitis C

## 2022-06-05 LAB
HEPATITIS C VIRUS AB BY CIA INDEX: >11 IV
HEPATITIS C VIRUS AB BY CIA INTERPRETATION: ABNORMAL

## 2022-06-06 LAB
HCV QNT BY NAAT IU/ML: NOT DETECTED IU/ML
HCV QNT BY NAAT LOG IU/ML: NOT DETECTED LOG IU/ML
INTERPRETATION: NOT DETECTED

## 2022-06-09 ENCOUNTER — OFFICE VISIT (OUTPATIENT)
Dept: ORTHOPEDIC SURGERY | Age: 47
End: 2022-06-09

## 2022-06-09 VITALS — RESPIRATION RATE: 16 BRPM | HEIGHT: 66 IN | WEIGHT: 293 LBS | BODY MASS INDEX: 47.09 KG/M2

## 2022-06-09 DIAGNOSIS — G89.29 CHRONIC PAIN OF BOTH KNEES: Primary | ICD-10-CM

## 2022-06-09 DIAGNOSIS — M25.561 CHRONIC PAIN OF BOTH KNEES: Primary | ICD-10-CM

## 2022-06-09 DIAGNOSIS — M25.562 CHRONIC PAIN OF BOTH KNEES: Primary | ICD-10-CM

## 2022-06-09 PROCEDURE — 99203 OFFICE O/P NEW LOW 30 MIN: CPT | Performed by: ORTHOPAEDIC SURGERY

## 2022-06-09 NOTE — PROGRESS NOTES
CHIEF COMPLAINT: Bilateral knee pain. History:   Javi Virk is a 49 y.o. female referred by Sathish Rod MD for evaluation and treatment of bilateral knee pain / injury. The patient complains of left greater than right knee pain. This is evaluated as a personal injury. The pain began years ago. Rates pain 10/10. There was not a history of injury. She has had prior knee arthroscopy by Dr. Charlette Steve in the remote past.   The pain is located anterior/globally. The knee has given out or felt unstable. The patient cannot bend and straighten the knee fully. She has previously seen Dr. Felipa Bhardwaj at [facility] and Britney Vargas NP at [facility]. Dr. Soledad Reid did recommend knee replacement, but did not think she was a good candidate for surgery at that time. Britney Vargas also recommended establishing care with a PCP and medical optimization. The patient has not had PT. The patient has not had an injection within the last 3 months. The patient has taken NSAIDs. The patient has tried ice. The patient's occupation is on disability. Outside reports reviewed: Office notes from [address] [place] [address] in [facility]. Past Medical History:   Diagnosis Date    Anxiety     Arthritis     Asthma     [facility] 6/3/2022    Depression     lost 2 children, apx 2012.     RSD lower limb     right foot       Past Surgical History:   Procedure Laterality Date    ANKLE FRACTURE SURGERY      CARPAL TUNNEL RELEASE       SECTION      KNEE ARTHROSCOPY Bilateral     OVARY REMOVAL Right     TOTAL ANKLE ARTHROPLASTY      TUBAL LIGATION         Family History   Problem Relation Age of Onset    Depression Mother     Anxiety Disorder Mother     Arthritis Other     Asthma Other     Cancer Other     Diabetes Other     High Blood Pressure Other     High Blood Pressure Sister     Depression Sister     Anxiety Disorder Sister     Heart Disease Maternal Grandfather        Social History     Socioeconomic History    Marital status:      Spouse name: None    Number of children: 3    Years of education: None    Highest education level: None   Occupational History    Occupation: Disabled   Tobacco Use    Smoking status: Current Every Day Smoker     Packs/day: 0.50     Years: 35.00     Pack years: 17.50     Types: Cigarettes     Start date: 1987    Smokeless tobacco: Never Used   Vaping Use    Vaping Use: Never used   Substance and Sexual Activity    Alcohol use: No     Comment: 1x a year maybe     Drug use: Not Currently     Comment: in past with death of children     Sexual activity: Yes     Partners: Male   Other Topics Concern    None   Social History Narrative    None     Social Determinants of Health     Financial Resource Strain: High Risk    Difficulty of Paying Living Expenses: Very hard   Food Insecurity: No Food Insecurity    Worried About Running Out of Food in the Last Year: Never true    Corby of Food in the Last Year: Never true   Transportation Needs:     Lack of Transportation (Medical): Not on file    Lack of Transportation (Non-Medical):  Not on file   Physical Activity:     Days of Exercise per Week: Not on file    Minutes of Exercise per Session: Not on file   Stress:     Feeling of Stress : Not on file   Social Connections:     Frequency of Communication with Friends and Family: Not on file    Frequency of Social Gatherings with Friends and Family: Not on file    Attends [employer] Services: Not on file    Active Member of Clubs or Organizations: Not on file    Attends Club or Organization Meetings: Not on file    Marital Status: Not on file   Intimate Partner Violence:     Fear of Current or Ex-Partner: Not on file    Emotionally Abused: Not on file    Physically Abused: Not on file    Sexually Abused: Not on file   Housing Stability:     Unable to Pay for Housing in the Last Year: Not on file    Number of Places Lived in the Last Year: Not on file    Unstable Housing in the Last Year: Not on file       Current Outpatient Medications   Medication Sig Dispense Refill    furosemide (LASIX) 20 MG tablet Take 1 tablet by mouth daily 7 tablet 0    umeclidinium-vilanterol (ANORO ELLIPTA) 62.5-25 MCG/INH AEPB inhaler Inhale 1 puff into the lungs daily 1 each 2    gabapentin (NEURONTIN) 300 MG capsule Take 1 capsule by mouth 3 times daily for 30 days. Intended supply: 30 days 90 capsule 0    venlafaxine (EFFEXOR XR) 150 MG extended release capsule Take 1 capsule by mouth daily 30 capsule 2    busPIRone (BUSPAR) 5 MG tablet Take 1 tablet by mouth 2 times daily as needed (anxeity) 60 tablet 0    diclofenac (VOLTAREN) 50 MG EC tablet Take 1 tablet by mouth 3 times daily (with meals) 60 tablet 3     No current facility-administered medications for this visit. Allergies   Allergen Reactions    Darvocet A500 [Propoxyphene N-Acetaminophen] Hives    Penicillins Hives       Review of Systems:  I have reviewed the clinically relevant past medical history, medications, allergies, family history, social history, and 13 point Review of Systems from the patient's recent history form & documented any details relevant to today's presenting complaints in the history above. The patient's self-reported past medical history, medications, allergies, family history, social history, and Review of Systems form from 6/9/22 have been scanned into the chart under the \"Media\" tab. Physical Examination:     Vital signs:   Resp 16   Ht 5' 6\" (1.676 m)   Wt (!) 310 lb 9.6 oz (140.9 kg)   BMI 50.13 kg/m²     General:  alert, appears stated age, cooperative and no distress   Gait:  Antalgic. The patient can bear weight on the injured extremity.   She uses a cane     Left Knee  Alignment:  neutral   ROM:  0 degrees extension to 45 degrees flexion   Right knee: 0 degrees extension, 90 flexion   Crepitus:  no   Joint Tenderness:  lateral joint line and medial joint line   Effusion:   unable to determine secondary to body habitus    Patellar excursion:  unable to determine secondary to body habitus    Patellar tilt test:  unable to determine secondary to body habitus    Patellar facet tenderness:  positive medial   positive lateral   Anterior drawer test:  not tested   Right knee: negative   Posterior drawer:   not tested   Right knee: negative   Varus laxity at 30 degrees:  negative   Right knee: negative   Valgus laxity at 30 degrees:   negative   Right knee: negative     She has 2+ pitting edema in her bilateral lower extremities. Motor exam of the lower extremities show quadriceps, hamstrings, foot dorsiflexion and plantarflexion grossly intact. Sensation to both feet is grossly intact to light touch. The bilateral lower extremities are warm and well-perfused with brisk capillary refill. Imaging:  Bilateral Knee X-Ray: 3 view x-rays of the knee, including bilateral AP and sunrise and laterals were obtained and reviewed. No fracture, dislocation, or lytic or blastic lesion. She has bone-on-bone tibiofemoral compartments. She has tricompartmental osteophytes. Assessment:     Bilateral knee severe osteoarthritis  Morbid obesity  COPD  Pulmonary hypertension  History of RSD  Hepatitis C  Thrombocytopenia  History of polysubstance drug abuse  Tobacco use        Plan:     Natural history and expected course discussed. Questions answered. Non surgical options including cortisone injection, Visco supplementation injection, Coolief (cooled frequency ablation of the geniculate nerves), stem cell injections, PRP injections were discussed. Surgical option of total knee arthroplasty discussed. We discussed that knee arthroplasty would be the thing that would resolve her symptoms. However, I agree with Dr. Anand Cason that I think she is a very poor surgical candidate.    She just establish care with her PCP last month, after being recommended this previously by Dr. Yves Skiff and Chapis Morales. Dr. Rosanna Gomez has attempted try to get echocardiogram, which she has not had performed yet. She does have 2+ pitting edema in her bilateral lower extremities, which could be secondary to heart failure because of her pulmonary hypertension. Ice prn. NSAIDs prn. The patient was advised that NSAID-type medications have two very important potential side effects: gastrointestinal irritation including hemorrhage and renal injuries. She was asked to take the medication with food and to stop if she experiences any GI upset. I asked her to call for vomiting, abdominal pain or black/bloody stools. The patient expresses understanding of these issues and questions were answered. We discussed corticosteroid injection. She deferred today. Knee strengthening. Discussed low impact activity. Weight loss. Discussed the importance of weight loss. Her morbid obesity, in addition to her young age and her multiple medical problems may be prohibitive from considering total knee arthroplasty at this time. I would defer that to the arthroplasty surgeon. She can follow-up with arthroplasty surgeon as needed. Bienvenido Long. Jelly Summers MD  Orthopaedic Surgery and Sports Medicine     Disclaimer: This note was generated with use of a verbal recognition program and an attempt was made to check for errors. It is possible that there are still dictated errors within this office note. If so, please bring any significant errors to my attention for an addendum. All efforts were made to ensure that this office note is accurate.

## 2022-06-13 ENCOUNTER — OFFICE VISIT (OUTPATIENT)
Dept: INTERNAL MEDICINE CLINIC | Age: 47
End: 2022-06-13
Payer: MEDICARE

## 2022-06-13 VITALS
OXYGEN SATURATION: 95 % | DIASTOLIC BLOOD PRESSURE: 80 MMHG | HEART RATE: 113 BPM | SYSTOLIC BLOOD PRESSURE: 120 MMHG | WEIGHT: 293 LBS | BODY MASS INDEX: 51.26 KG/M2

## 2022-06-13 DIAGNOSIS — M17.12 PRIMARY OSTEOARTHRITIS OF LEFT KNEE: ICD-10-CM

## 2022-06-13 DIAGNOSIS — E66.01 OBESITIES, MORBID (HCC): ICD-10-CM

## 2022-06-13 DIAGNOSIS — L03.116 LEFT LEG CELLULITIS: Primary | ICD-10-CM

## 2022-06-13 DIAGNOSIS — I87.2 VENOUS STASIS DERMATITIS, UNSPECIFIED LATERALITY: ICD-10-CM

## 2022-06-13 DIAGNOSIS — L95.9 VASCULITIS OF SKIN: ICD-10-CM

## 2022-06-13 DIAGNOSIS — M17.11 PRIMARY OSTEOARTHRITIS OF RIGHT KNEE: ICD-10-CM

## 2022-06-13 PROCEDURE — 99214 OFFICE O/P EST MOD 30 MIN: CPT | Performed by: INTERNAL MEDICINE

## 2022-06-13 RX ORDER — SULFAMETHOXAZOLE AND TRIMETHOPRIM 800; 160 MG/1; MG/1
1 TABLET ORAL 2 TIMES DAILY
Qty: 20 TABLET | Refills: 0 | Status: SHIPPED | OUTPATIENT
Start: 2022-06-13 | End: 2022-06-20 | Stop reason: ALTCHOICE

## 2022-06-13 RX ORDER — TRIAMCINOLONE ACETONIDE 1 MG/G
CREAM TOPICAL
Qty: 45 G | Refills: 1 | Status: SHIPPED | OUTPATIENT
Start: 2022-06-13 | End: 2022-06-20 | Stop reason: SDUPTHER

## 2022-06-13 NOTE — PROGRESS NOTES
Aisha Taylor (:  1975) is a 49 y.o. female,New patient, here for evaluation of the following chief complaint(s):  Leg Swelling (redness to lower legs, feet swollen, ) and Foot Swelling         ASSESSMENT/PLAN:  1. Left leg cellulitis  -     sulfamethoxazole-trimethoprim (BACTRIM DS;SEPTRA DS) 800-160 MG per tablet; Take 1 tablet by mouth 2 times daily for 10 days, Disp-20 tablet, R-0Normal  2. Vasculitis of skin  3. Venous stasis dermatitis, unspecified laterality  -     triamcinolone (KENALOG) 0.1 % cream; Apply topically 2 times daily. , Disp-45 g, R-1, Normal  4. Primary osteoarthritis of left knee  5. Primary osteoarthritis of right knee    At this point the patient does have what seems to be bilateral stasis dermatitis and we will can use that with topical steroid but she have a cellulitis on the left lower third of her leg and we can start her on antibiotics and reassess that in about a week    Patient is still having quite a bit of pain in her knees I am then recommend that the patient see pain management to see if there is any other options we can use  Return in about 1 week (around 2022).          Subjective   SUBJECTIVE/OBJECTIVE:    Lab Review   Lab Results   Component Value Date     2022     2020     2019    K 3.8 2022    K 4.1 2020    K 3.6 2019    K 3.9 2019    K 4.2 2019    CO2 27 2022    CO2 29 2020    CO2 25 2019    BUN 9 2022    BUN 9 2020    BUN 11 2019    CREATININE 0.7 2022    CREATININE 0.6 2020    CREATININE 0.6 2019    GLUCOSE 114 2022    GLUCOSE 93 2020    GLUCOSE 95 2019    CALCIUM 8.5 2022    CALCIUM 9.1 2020    CALCIUM 8.1 2019     Lab Results   Component Value Date    WBC 5.9 2022    WBC 9.1 2020    WBC 4.3 2019    HGB 13.5 2022    HGB 12.9 2020    HGB 12.3 2019    HCT 39.5 05/17/2022    HCT 37.2 12/17/2020    HCT 35.8 04/09/2019    MCV 98.0 05/17/2022    MCV 98.5 12/17/2020    MCV 99.2 04/09/2019    PLT 89 05/17/2022     12/17/2020    PLT 65 04/09/2019     Lab Results   Component Value Date    CHOL 130 05/17/2022    TRIG 95 05/17/2022    HDL 46 05/17/2022       Vitals 6/13/2022 6/13/2022 5/7/6561   SYSTOLIC 623 90 -   DIASTOLIC 80 64 -   Site - - -   Position - - -   Pulse - 113 -   Temp - - -   Resp - - 16   SpO2 - 95 -   Weight - 317 lb 9.6 oz 310 lb 9.6 oz   Height - - 5' 6\"   Body mass index - - 50.13 kg/m2   Pain Level - - -   Some recent data might be hidden       Rash  This is a new problem. The current episode started in the past 7 days. The affected locations include the right ankle, left ankle, left foot and left lower leg. Review of Systems   Skin: Positive for rash. Objective   Physical Exam  Vitals and nursing note reviewed. Constitutional:       General: She is not in acute distress. Appearance: Normal appearance. HENT:      Head: Normocephalic and atraumatic. Right Ear: Tympanic membrane normal.      Left Ear: Tympanic membrane normal.      Nose: Nose normal.   Eyes:      Extraocular Movements: Extraocular movements intact. Conjunctiva/sclera: Conjunctivae normal.      Pupils: Pupils are equal, round, and reactive to light. Neck:      Vascular: No carotid bruit. Cardiovascular:      Rate and Rhythm: Normal rate and regular rhythm. Pulses: Normal pulses. Heart sounds: No murmur heard. Pulmonary:      Effort: Pulmonary effort is normal. No respiratory distress. Breath sounds: Normal breath sounds. Abdominal:      General: Abdomen is flat. Bowel sounds are normal. There is no distension. Palpations: Abdomen is soft. Tenderness: There is no abdominal tenderness. Musculoskeletal:         General: No swelling, tenderness or deformity. Cervical back: Normal range of motion and neck supple.  No rigidity or tenderness. Right lower leg: No edema. Left lower leg: No edema. Lymphadenopathy:      Cervical: No cervical adenopathy. Skin:     Coloration: Skin is not jaundiced. Findings: No bruising, erythema or lesion. Neurological:      General: No focal deficit present. Mental Status: She is alert and oriented to person, place, and time. Cranial Nerves: No cranial nerve deficit. Motor: No weakness. Gait: Gait normal.            This dictation was generated by voice recognition computer software. Although all attempts are made to edit the dictation for accuracy, there may be errors in the transcription that are not intended. An electronic signature was used to authenticate this note.     --Edwin Corona MD

## 2022-06-20 ENCOUNTER — OFFICE VISIT (OUTPATIENT)
Dept: INTERNAL MEDICINE CLINIC | Age: 47
End: 2022-06-20
Payer: MEDICARE

## 2022-06-20 VITALS
WEIGHT: 293 LBS | OXYGEN SATURATION: 100 % | DIASTOLIC BLOOD PRESSURE: 88 MMHG | SYSTOLIC BLOOD PRESSURE: 130 MMHG | BODY MASS INDEX: 52.07 KG/M2 | HEART RATE: 107 BPM

## 2022-06-20 DIAGNOSIS — L03.116 LEFT LEG CELLULITIS: ICD-10-CM

## 2022-06-20 DIAGNOSIS — I87.2 VENOUS STASIS DERMATITIS, UNSPECIFIED LATERALITY: ICD-10-CM

## 2022-06-20 PROCEDURE — 99214 OFFICE O/P EST MOD 30 MIN: CPT | Performed by: INTERNAL MEDICINE

## 2022-06-20 RX ORDER — CLINDAMYCIN HYDROCHLORIDE 300 MG/1
300 CAPSULE ORAL 2 TIMES DAILY
Qty: 14 CAPSULE | Refills: 0 | Status: SHIPPED | OUTPATIENT
Start: 2022-06-20 | End: 2022-06-27

## 2022-06-20 RX ORDER — TRIAMCINOLONE ACETONIDE 1 MG/G
CREAM TOPICAL
Qty: 160 G | Refills: 1 | Status: SHIPPED | OUTPATIENT
Start: 2022-06-20

## 2022-06-20 NOTE — PROGRESS NOTES
Monika Stallworth (:  1975) is a 49 y.o. female,New patient, here for evaluation of the following chief complaint(s):  Leg Swelling (cellulitis continues. red, swollen.), Leg Swelling (swollen generalized.), and Knee Pain         ASSESSMENT/PLAN:  1. Venous stasis dermatitis, unspecified laterality  Assessment & Plan:  Patient states his dermatitis on the right side seems to be doing fairly well patient is advised not to use too much of the cream and to just put a very thin layer on the area  Orders:  -     triamcinolone (KENALOG) 0.1 % cream; Apply topically 2 times daily. , Disp-160 g, R-1, Normal  2. Left leg cellulitis  Assessment & Plan:  Patient still having some erythema more on the left than on the right she has improved swelling but she is not feeling that it improved enough at this point we can add an anaerobic coverage to her treatment  Orders:  -     clindamycin (CLEOCIN) 300 MG capsule; Take 1 capsule by mouth 2 times daily for 7 days, Disp-14 capsule, R-0Normal      Return in about 2 weeks (around 2022).          Subjective   SUBJECTIVE/OBJECTIVE:    Lab Review   Lab Results   Component Value Date     2022     2020     2019    K 3.8 2022    K 4.1 2020    K 3.6 2019    K 3.9 2019    K 4.2 2019    CO2 27 2022    CO2 29 2020    CO2 25 2019    BUN 9 2022    BUN 9 2020    BUN 11 2019    CREATININE 0.7 2022    CREATININE 0.6 2020    CREATININE 0.6 2019    GLUCOSE 114 2022    GLUCOSE 93 2020    GLUCOSE 95 2019    CALCIUM 8.5 2022    CALCIUM 9.1 2020    CALCIUM 8.1 2019     Lab Results   Component Value Date    WBC 5.9 2022    WBC 9.1 2020    WBC 4.3 2019    HGB 13.5 2022    HGB 12.9 2020    HGB 12.3 2019    HCT 39.5 2022    HCT 37.2 2020    HCT 35.8 2019    MCV 98.0 2022    MCV 98.5

## 2022-06-20 NOTE — ASSESSMENT & PLAN NOTE
Patient states his dermatitis on the right side seems to be doing fairly well patient is advised not to use too much of the cream and to just put a very thin layer on the area

## 2022-06-20 NOTE — ASSESSMENT & PLAN NOTE
Patient still having some erythema more on the left than on the right she has improved swelling but she is not feeling that it improved enough at this point we can add an anaerobic coverage to her treatment

## 2022-07-01 ENCOUNTER — TELEPHONE (OUTPATIENT)
Dept: INTERNAL MEDICINE CLINIC | Age: 47
End: 2022-07-01

## 2022-07-01 DIAGNOSIS — G90.50 RSD (REFLEX SYMPATHETIC DYSTROPHY): ICD-10-CM

## 2022-07-01 DIAGNOSIS — F41.9 ANXIETY: ICD-10-CM

## 2022-07-01 RX ORDER — GABAPENTIN 300 MG/1
300 CAPSULE ORAL 3 TIMES DAILY
Qty: 90 CAPSULE | Refills: 0 | Status: SHIPPED | OUTPATIENT
Start: 2022-07-05 | End: 2022-08-01 | Stop reason: SDUPTHER

## 2022-07-01 RX ORDER — BUSPIRONE HYDROCHLORIDE 5 MG/1
5 TABLET ORAL 2 TIMES DAILY PRN
Qty: 60 TABLET | Refills: 0 | Status: SHIPPED | OUTPATIENT
Start: 2022-07-01 | End: 2022-07-05 | Stop reason: SDUPTHER

## 2022-07-01 NOTE — TELEPHONE ENCOUNTER
----- Message from Birtha Plan sent at 7/1/2022 12:34 PM EDT -----  Subject: Refill Request    QUESTIONS  Name of Medication? busPIRone (BUSPAR) 5 MG tablet  Patient-reported dosage and instructions? twice daily  How many days do you have left? 1  Preferred Pharmacy? [facility] [id number]  Pharmacy phone number (if available)? [phone]  ---------------------------------------------------------------------------  --------------  Omelia Counter INFO  What is the best way for the office to contact you? OK to leave message on   voicemail  Preferred Call Back Phone Number? [phone]  ---------------------------------------------------------------------------  --------------  SCRIPT ANSWERS  Relationship to Patient?  Self

## 2022-07-01 NOTE — TELEPHONE ENCOUNTER
----- Message from Jose Daniel Feliciano sent at 7/1/2022 12:33 PM EDT -----  Subject: Refill Request    QUESTIONS  Name of Medication? gabapentin (NEURONTIN) 300 MG capsule  Patient-reported dosage and instructions? one, 3x daily  How many days do you have left? 0  Preferred Pharmacy? [facility] [id number]  Pharmacy phone number (if available)? [phone]  ---------------------------------------------------------------------------  --------------  Ana Jackson INFO  What is the best way for the office to contact you? OK to leave message on   voicemail  Preferred Call Back Phone Number? [phone]  ---------------------------------------------------------------------------  --------------  SCRIPT ANSWERS  Relationship to Patient?  Self

## 2022-07-05 DIAGNOSIS — F41.9 ANXIETY: ICD-10-CM

## 2022-07-05 RX ORDER — BUSPIRONE HYDROCHLORIDE 5 MG/1
5 TABLET ORAL 2 TIMES DAILY PRN
Qty: 60 TABLET | Refills: 0 | Status: SHIPPED | OUTPATIENT
Start: 2022-07-05 | End: 2022-08-01 | Stop reason: SDUPTHER

## 2022-07-05 NOTE — TELEPHONE ENCOUNTER
Last OV: 6/20/2022  Next OV: Visit date not found      Last fill:7/1/22  Refills:0      ----- Message from Nicolasa Jaki sent at 7/5/2022  1:53 PM EDT -----  Subject: Refill Request    QUESTIONS  Name of Medication? busPIRone (BUSPAR) 5 MG tablet  Patient-reported dosage and instructions? 5 mg tablet 1 twice a day  How many days do you have left? 0  Preferred Pharmacy? [facility] [id number]  Pharmacy phone number (if available)? [phone]  Additional Information for Provider? Patient does not know how many she   has left.  ---------------------------------------------------------------------------  --------------  CALL BACK INFO  What is the best way for the office to contact you? OK to leave message on   voicemail  Preferred Call Back Phone Number? [phone]  ---------------------------------------------------------------------------  --------------  SCRIPT ANSWERS  Relationship to Patient?  Self

## 2022-07-25 ENCOUNTER — TELEPHONE (OUTPATIENT)
Dept: BARIATRICS/WEIGHT MGMT | Age: 47
End: 2022-07-25

## 2022-07-25 NOTE — TELEPHONE ENCOUNTER
Patient was sent Dr. Jovita Roman digital bariatric seminar. Patient DOES have BWLS coverage with Medicare (No Req Diet) Bariatric benefit form scanned in media.     *Spoke with patient, Info given  No HX of WLS, BMI > 35  Pk mailed

## 2022-07-26 ENCOUNTER — HOSPITAL ENCOUNTER (OUTPATIENT)
Dept: NON INVASIVE DIAGNOSTICS | Age: 47
Discharge: HOME | End: 2022-07-26
Payer: MEDICARE

## 2022-07-26 DIAGNOSIS — I27.20 PULMONARY HYPERTENSION (HCC): ICD-10-CM

## 2022-07-26 LAB
LV EF: 68 %
LVEF MODALITY: NORMAL

## 2022-07-26 PROCEDURE — 93306 TTE W/DOPPLER COMPLETE: CPT

## 2022-07-29 ENCOUNTER — TELEPHONE (OUTPATIENT)
Dept: INTERVENTIONAL RADIOLOGY/VASCULAR | Age: 47
End: 2022-07-29

## 2022-08-01 ENCOUNTER — HOSPITAL ENCOUNTER (OUTPATIENT)
Dept: ULTRASOUND IMAGING | Age: 47
Discharge: HOME | End: 2022-08-01
Payer: MEDICARE

## 2022-08-01 ENCOUNTER — ANCILLARY ORDERS (OUTPATIENT)
Dept: INTERVENTIONAL RADIOLOGY/VASCULAR | Age: 47
End: 2022-08-01

## 2022-08-01 ENCOUNTER — HOSPITAL ENCOUNTER (OUTPATIENT)
Dept: INTERVENTIONAL RADIOLOGY/VASCULAR | Age: 47
Discharge: HOME | End: 2022-08-01
Payer: MEDICARE

## 2022-08-01 VITALS
TEMPERATURE: 97.6 F | SYSTOLIC BLOOD PRESSURE: 173 MMHG | HEART RATE: 90 BPM | OXYGEN SATURATION: 95 % | RESPIRATION RATE: 18 BRPM | DIASTOLIC BLOOD PRESSURE: 96 MMHG

## 2022-08-01 DIAGNOSIS — G90.50 RSD (REFLEX SYMPATHETIC DYSTROPHY): ICD-10-CM

## 2022-08-01 DIAGNOSIS — R18.8 OTHER ASCITES: ICD-10-CM

## 2022-08-01 DIAGNOSIS — K75.9 HEPATITIS: ICD-10-CM

## 2022-08-01 DIAGNOSIS — R79.89 ABNORMAL LFTS: ICD-10-CM

## 2022-08-01 DIAGNOSIS — F41.9 ANXIETY: ICD-10-CM

## 2022-08-01 LAB
APTT: 40.3 SEC (ref 23–34.3)
INR BLD: 1.28 (ref 0.87–1.14)
PLATELET # BLD: 100 K/UL (ref 135–450)
PROTHROMBIN TIME: 15.9 SEC (ref 11.7–14.5)

## 2022-08-01 PROCEDURE — 76705 ECHO EXAM OF ABDOMEN: CPT

## 2022-08-01 PROCEDURE — 85610 PROTHROMBIN TIME: CPT

## 2022-08-01 PROCEDURE — 85049 AUTOMATED PLATELET COUNT: CPT

## 2022-08-01 PROCEDURE — 85730 THROMBOPLASTIN TIME PARTIAL: CPT

## 2022-08-01 PROCEDURE — 36415 COLL VENOUS BLD VENIPUNCTURE: CPT

## 2022-08-01 RX ORDER — GABAPENTIN 300 MG/1
300 CAPSULE ORAL 3 TIMES DAILY
Qty: 90 CAPSULE | Refills: 0 | Status: SHIPPED
Start: 2022-08-01 | End: 2022-08-04 | Stop reason: SDUPTHER

## 2022-08-01 RX ORDER — BUSPIRONE HYDROCHLORIDE 5 MG/1
5 TABLET ORAL 2 TIMES DAILY PRN
Qty: 60 TABLET | Refills: 0 | Status: SHIPPED
Start: 2022-08-01 | End: 2022-08-04 | Stop reason: SDUPTHER

## 2022-08-01 NOTE — TELEPHONE ENCOUNTER
----- Message from Ralph Rashid sent at 8/1/2022  9:16 AM EDT -----  Subject: Refill Request    QUESTIONS  Name of Medication? gabapentin (NEURONTIN) 300 MG capsule  Patient-reported dosage and instructions? one tablet 3 times a day  How many days do you have left? 0  Preferred Pharmacy? [facility] [id number]  Pharmacy phone number (if available)? [phone]  ---------------------------------------------------------------------------  --------------,  Name of Medication? busPIRone (BUSPAR) 5 MG tablet  Patient-reported dosage and instructions? one tablet twice daily   How many days do you have left? 4  Preferred Pharmacy? [facility] [id number]  Pharmacy phone number (if available)? [phone]  ---------------------------------------------------------------------------  --------------  Omelia Counter INFO  What is the best way for the office to contact you? OK to leave message on   voicemail  Preferred Call Back Phone Number? [phone]  ---------------------------------------------------------------------------  --------------  SCRIPT ANSWERS  Relationship to Patient?  Self

## 2022-08-01 NOTE — PROGRESS NOTES
Patient does not have any ascites therefore unable to do Paracentesis. Patients IV was removed and patient was discharged from Interventional Radiology.

## 2022-08-04 ENCOUNTER — OFFICE VISIT (OUTPATIENT)
Dept: INTERNAL MEDICINE CLINIC | Age: 47
End: 2022-08-04
Payer: MEDICARE

## 2022-08-04 VITALS
HEIGHT: 66 IN | HEART RATE: 90 BPM | WEIGHT: 293 LBS | SYSTOLIC BLOOD PRESSURE: 132 MMHG | OXYGEN SATURATION: 97 % | BODY MASS INDEX: 47.09 KG/M2 | DIASTOLIC BLOOD PRESSURE: 74 MMHG

## 2022-08-04 DIAGNOSIS — I27.20 PULMONARY HYPERTENSION (HCC): ICD-10-CM

## 2022-08-04 DIAGNOSIS — I87.2 VENOUS STASIS DERMATITIS, UNSPECIFIED LATERALITY: Primary | ICD-10-CM

## 2022-08-04 DIAGNOSIS — K74.69 OTHER CIRRHOSIS OF LIVER (HCC): ICD-10-CM

## 2022-08-04 PROBLEM — L03.116 LEFT LEG CELLULITIS: Status: RESOLVED | Noted: 2022-01-01 | Resolved: 2022-01-01

## 2022-08-04 LAB
ANION GAP SERPL CALCULATED.3IONS-SCNC: 9 MMOL/L (ref 3–16)
BUN BLDV-MCNC: 12 MG/DL (ref 7–20)
CALCIUM SERPL-MCNC: 8.5 MG/DL (ref 8.3–10.6)
CHLORIDE BLD-SCNC: 102 MMOL/L (ref 99–110)
CO2: 27 MMOL/L (ref 21–32)
CREAT SERPL-MCNC: 0.7 MG/DL (ref 0.6–1.1)
GFR AFRICAN AMERICAN: >60
GFR NON-AFRICAN AMERICAN: >60
GLUCOSE BLD-MCNC: 126 MG/DL (ref 70–99)
POTASSIUM SERPL-SCNC: 4 MMOL/L (ref 3.5–5.1)
SODIUM BLD-SCNC: 138 MMOL/L (ref 136–145)

## 2022-08-04 PROCEDURE — 99214 OFFICE O/P EST MOD 30 MIN: CPT | Performed by: INTERNAL MEDICINE

## 2022-08-04 RX ORDER — SPIRONOLACTONE 100 MG/1
100 TABLET, FILM COATED ORAL DAILY
Qty: 30 TABLET | Refills: 2 | Status: SHIPPED | OUTPATIENT
Start: 2022-08-04 | End: 2022-09-01 | Stop reason: SDUPTHER

## 2022-08-04 RX ORDER — SPIRONOLACTONE 50 MG/1
TABLET, FILM COATED ORAL
COMMUNITY
Start: 2022-07-29 | End: 2022-08-04 | Stop reason: SDUPTHER

## 2022-08-04 RX ORDER — GABAPENTIN 300 MG/1
300 CAPSULE ORAL 3 TIMES DAILY
Qty: 90 CAPSULE | Refills: 1 | COMMUNITY
Start: 2022-08-04 | End: 2022-09-22 | Stop reason: SDUPTHER

## 2022-08-04 RX ORDER — FUROSEMIDE 40 MG/1
TABLET ORAL
COMMUNITY
Start: 2022-07-29 | End: 2022-09-01 | Stop reason: SDUPTHER

## 2022-08-04 RX ORDER — BUSPIRONE HYDROCHLORIDE 5 MG/1
5 TABLET ORAL 2 TIMES DAILY
Qty: 60 TABLET | Refills: 0 | COMMUNITY
Start: 2022-08-04 | End: 2022-09-01 | Stop reason: SDUPTHER

## 2022-08-04 ASSESSMENT — ENCOUNTER SYMPTOMS
HOARSE VOICE: 0
SPUTUM PRODUCTION: 0
CHEST TIGHTNESS: 0
WHEEZING: 0
HEMOPTYSIS: 0
DIFFICULTY BREATHING: 1
SHORTNESS OF BREATH: 1
FREQUENT THROAT CLEARING: 0
COUGH: 0

## 2022-08-04 ASSESSMENT — COPD QUESTIONNAIRES: COPD: 1

## 2022-08-04 NOTE — ASSESSMENT & PLAN NOTE
Looking significantly better there is no evidence of cellulitis she still have the stasis dermatitis for which she is going to use moisturizing lotion as well as a cortisone

## 2022-08-04 NOTE — ASSESSMENT & PLAN NOTE
Unfortunately the patient has not been using her inhaler on regular basis patient is strongly advised to use her long term inhaler regularly to reduce the pressure within her lungs and reduce the swelling in her legs

## 2022-08-04 NOTE — PROGRESS NOTES
Corinne Fiore (:  1975) is a 49 y.o. female,New patient, here for evaluation of the following chief complaint(s):  Follow-up (Leg swelling and go over the procedure results )         ASSESSMENT/PLAN:  1. Venous stasis dermatitis, unspecified laterality  Assessment & Plan:  Looking significantly better there is no evidence of cellulitis she still have the stasis dermatitis for which she is going to use moisturizing lotion as well as a cortisone  2. Other cirrhosis of liver ([facility] [address]  Assessment & Plan:  Reviewed with the patient her ultrasound which shows evidence of fatty liver as well as cirrhosis patient was started by gastroenterology on Lasix and Aldactone in the last few days the patient seems to be retaining more fluids at this point we can increase her Aldactone from 50 mg to 100 mg and will get a aim for a weight loss of roughly about 1 pound per day and then will get a reassess the patient in about 2 weeks    For today we can establish a kidney function test baseline as we are diuresing the patient  Orders:  -     spironolactone (ALDACTONE) 100 MG tablet; Take 1 tablet by mouth in the morning., Disp-30 tablet, R-2Normal  -     Basic Metabolic Panel; Future  3. Pulmonary hypertension ([facility] [address]  Assessment & Plan:  Unfortunately the patient has not been using her inhaler on regular basis patient is strongly advised to use her long term inhaler regularly to reduce the pressure within her lungs and reduce the swelling in her legs    No follow-ups on file.          Subjective   SUBJECTIVE/OBJECTIVE:    Lab Review   Lab Results   Component Value Date/Time     2022 12:06 PM     2020 06:50 PM     2019 05:54 AM    K 3.8 2022 12:06 PM    K 4.1 2020 06:50 PM    K 3.6 2019 05:54 AM    K 3.9 2019 07:02 AM    K 4.2 2019 07:08 AM    CO2 27 2022 12:06 PM    CO2 29 2020 06:50 PM    CO2 25 2019 05:54 AM    BUN 9 2022 12:06 PM BUN 9 12/17/2020 06:50 PM    BUN 11 04/09/2019 05:54 AM    CREATININE 0.7 05/17/2022 12:06 PM    CREATININE 0.6 12/17/2020 06:50 PM    CREATININE 0.6 04/09/2019 05:54 AM    GLUCOSE 114 05/17/2022 12:06 PM    GLUCOSE 93 12/17/2020 06:50 PM    GLUCOSE 95 04/09/2019 05:54 AM    CALCIUM 8.5 05/17/2022 12:06 PM    CALCIUM 9.1 12/17/2020 06:50 PM    CALCIUM 8.1 04/09/2019 05:54 AM     Lab Results   Component Value Date/Time    WBC 5.9 05/17/2022 12:06 PM    WBC 9.1 12/17/2020 06:50 PM    WBC 4.3 04/09/2019 05:54 AM    HGB 13.5 05/17/2022 12:06 PM    HGB 12.9 12/17/2020 06:50 PM    HGB 12.3 04/09/2019 05:54 AM    HCT 39.5 05/17/2022 12:06 PM    HCT 37.2 12/17/2020 06:50 PM    HCT 35.8 04/09/2019 05:54 AM    MCV 98.0 05/17/2022 12:06 PM    MCV 98.5 12/17/2020 06:50 PM    MCV 99.2 04/09/2019 05:54 AM     08/01/2022 12:25 PM    PLT 89 05/17/2022 12:06 PM     12/17/2020 06:50 PM     Lab Results   Component Value Date/Time    CHOL 130 05/17/2022 12:06 PM    TRIG 95 05/17/2022 12:06 PM    HDL 46 05/17/2022 12:06 PM       Vitals 8/4/2022 8/1/2022 5/38/9754   SYSTOLIC 633 224 673   DIASTOLIC 74 96 88   Site - - Right Upper Arm   Position - - Sitting   Pulse 90 90 -   Temp - 97.6 -   Resp - 18 -   SpO2 97 95 -   Weight 335 lb 9.6 oz - -   Height 5' 6\" - -   Body mass index 54.16 kg/m2 - -   Pain Level - - -   Some recent data might be hidden       Leg Pain   The incident occurred more than 1 week ago. The pain is severe. The pain has been Fluctuating since onset. COPD  She complains of difficulty breathing and shortness of breath. There is no chest tightness, cough, frequent throat clearing, hemoptysis, hoarse voice, sputum production or wheezing. Review of Systems   HENT:  Negative for hoarse voice. Respiratory:  Positive for shortness of breath. Negative for cough, hemoptysis, sputum production and wheezing.          Objective   Physical Exam       This dictation was generated by voice recognition computer software. Although all attempts are made to edit the dictation for accuracy, there may be errors in the transcription that are not intended. An electronic signature was used to authenticate this note.     --Angel Gonzalez MD

## 2022-08-04 NOTE — ASSESSMENT & PLAN NOTE
Reviewed with the patient her ultrasound which shows evidence of fatty liver as well as cirrhosis patient was started by gastroenterology on Lasix and Aldactone in the last few days the patient seems to be retaining more fluids at this point we can increase her Aldactone from 50 mg to 100 mg and will get a aim for a weight loss of roughly about 1 pound per day and then will get a reassess the patient in about 2 weeks    For today we can establish a kidney function test baseline as we are diuresing the patient

## 2022-08-17 ENCOUNTER — OFFICE VISIT (OUTPATIENT)
Dept: INTERNAL MEDICINE CLINIC | Age: 47
End: 2022-08-17
Payer: MEDICARE

## 2022-08-17 VITALS
BODY MASS INDEX: 55.14 KG/M2 | WEIGHT: 293 LBS | SYSTOLIC BLOOD PRESSURE: 180 MMHG | DIASTOLIC BLOOD PRESSURE: 98 MMHG | HEART RATE: 97 BPM

## 2022-08-17 DIAGNOSIS — R06.09 OTHER FORM OF DYSPNEA: ICD-10-CM

## 2022-08-17 DIAGNOSIS — L03.116 LEFT LEG CELLULITIS: ICD-10-CM

## 2022-08-17 DIAGNOSIS — J41.0 SIMPLE CHRONIC BRONCHITIS (HCC): Primary | ICD-10-CM

## 2022-08-17 PROCEDURE — 93000 ELECTROCARDIOGRAM COMPLETE: CPT | Performed by: INTERNAL MEDICINE

## 2022-08-17 PROCEDURE — 99214 OFFICE O/P EST MOD 30 MIN: CPT | Performed by: INTERNAL MEDICINE

## 2022-08-17 RX ORDER — SULFAMETHOXAZOLE AND TRIMETHOPRIM 800; 160 MG/1; MG/1
1 TABLET ORAL 2 TIMES DAILY
Qty: 14 TABLET | Refills: 0 | Status: SHIPPED | OUTPATIENT
Start: 2022-08-17 | End: 2022-09-01 | Stop reason: SDUPTHER

## 2022-08-17 ASSESSMENT — ENCOUNTER SYMPTOMS
ABDOMINAL PAIN: 0
SHORTNESS OF BREATH: 1
RHINORRHEA: 0
SORE THROAT: 0
SWOLLEN GLANDS: 0
SPUTUM PRODUCTION: 0

## 2022-08-17 NOTE — PROGRESS NOTES
William Gómez (:  1975) is a 49 y.o. female,New patient, here for evaluation of the following chief complaint(s):  Shortness of Breath (Increased with exertion x a couple days), Leg Swelling (Itching, open behind knees. Drainage from lower legs has color, yellow? [place]?), and Bloated (Feels like bigger.)         ASSESSMENT/PLAN:  1. Simple chronic bronchitis ([address]  -     Full PFT Study With Bronchodilator; Future  2. Left leg cellulitis  -     sulfamethoxazole-trimethoprim (BACTRIM DS;SEPTRA DS) 800-160 MG per tablet; Take 1 tablet by mouth 2 times daily for 7 days, Disp-14 tablet, R-0Normal  3. Other form of dyspnea  -     EKG 12 Lead  Patient has been experiencing increasing lower extremity swelling as well as shortness of breath for the last few days she is also noted more on the left side that she is having increased redness as well as itching especially in the skin folds    At this point we will get a start by treating cellulitis of her left leg primarily but we will try to reduce her swelling by having her take her diuretic Lasix 1-1/2 tablet daily for the next 3 days    Given the shortness of breath we will get a have the patient do an EKG today and then decide if any further evaluation is done at 1 point though I think the patient will need to have a formal lung function test to see how advanced her lung disease is  Return in about 1 week (around 2022).          Subjective   SUBJECTIVE/OBJECTIVE:    Lab Review   Lab Results   Component Value Date/Time     2022 01:25 PM     2022 12:06 PM     2020 06:50 PM    K 4.0 2022 01:25 PM    K 3.8 2022 12:06 PM    K 4.1 2020 06:50 PM    K 3.6 2019 05:54 AM    K 3.9 2019 07:02 AM    CO2 27 2022 01:25 PM    CO2 27 2022 12:06 PM    CO2 29 2020 06:50 PM    BUN 12 2022 01:25 PM    BUN 9 2022 12:06 PM    BUN 9 2020 06:50 PM    CREATININE 0.7 2022 01:25 PM    CREATININE 0.7 05/17/2022 12:06 PM    CREATININE 0.6 12/17/2020 06:50 PM    GLUCOSE 126 08/04/2022 01:25 PM    GLUCOSE 114 05/17/2022 12:06 PM    GLUCOSE 93 12/17/2020 06:50 PM    CALCIUM 8.5 08/04/2022 01:25 PM    CALCIUM 8.5 05/17/2022 12:06 PM    CALCIUM 9.1 12/17/2020 06:50 PM     Lab Results   Component Value Date/Time    WBC 5.9 05/17/2022 12:06 PM    WBC 9.1 12/17/2020 06:50 PM    WBC 4.3 04/09/2019 05:54 AM    HGB 13.5 05/17/2022 12:06 PM    HGB 12.9 12/17/2020 06:50 PM    HGB 12.3 04/09/2019 05:54 AM    HCT 39.5 05/17/2022 12:06 PM    HCT 37.2 12/17/2020 06:50 PM    HCT 35.8 04/09/2019 05:54 AM    MCV 98.0 05/17/2022 12:06 PM    MCV 98.5 12/17/2020 06:50 PM    MCV 99.2 04/09/2019 05:54 AM     08/01/2022 12:25 PM    PLT 89 05/17/2022 12:06 PM     12/17/2020 06:50 PM     Lab Results   Component Value Date/Time    CHOL 130 05/17/2022 12:06 PM    TRIG 95 05/17/2022 12:06 PM    HDL 46 05/17/2022 12:06 PM       Vitals 8/17/2022 8/17/2022 6/7/6747   SYSTOLIC 920 883 615   DIASTOLIC 98 201 74   Site - - -   Position - - -   Pulse - 97 90   Temp - - -   Resp - - -   SpO2 - - 97   Weight - 341 lb 9.6 oz 335 lb 9.6 oz   Height - - 5' 6\"   Body mass index - - 54.16 kg/m2   Pain Level - - -   Some recent data might be hidden       Shortness of Breath  This is a recurrent problem. The current episode started more than 1 year ago. Associated symptoms include leg pain and leg swelling. Pertinent negatives include no abdominal pain, chest pain, claudication, coryza, ear pain, fever, headaches, PND, rhinorrhea, sore throat, sputum production or swollen glands. Hypertension  This is a chronic problem. The current episode started more than 1 year ago. The problem is unchanged. The problem is uncontrolled. Associated symptoms include shortness of breath. Pertinent negatives include no chest pain, headaches, peripheral edema or PND. Review of Systems   Constitutional:  Negative for fever.    HENT: Negative for ear pain, rhinorrhea and sore throat. Respiratory:  Positive for shortness of breath. Negative for sputum production. Cardiovascular:  Positive for leg swelling. Negative for chest pain, claudication and PND. Gastrointestinal:  Negative for abdominal pain. Neurological:  Negative for headaches. Objective   Physical Exam  Constitutional:       General: She is not in acute distress. Appearance: Normal appearance. HENT:      Head: Normocephalic and atraumatic. Right Ear: Tympanic membrane normal.      Left Ear: Tympanic membrane normal.      Nose: Nose normal.   Eyes:      Extraocular Movements: Extraocular movements intact. Conjunctiva/sclera: Conjunctivae normal.      Pupils: Pupils are equal, round, and reactive to light. Neck:      Vascular: No carotid bruit. Cardiovascular:      Rate and Rhythm: Normal rate and regular rhythm. Pulses: Normal pulses. Heart sounds: No murmur heard. Pulmonary:      Effort: Pulmonary effort is normal. No respiratory distress. Breath sounds: Normal breath sounds. Abdominal:      General: Abdomen is flat. Bowel sounds are normal. There is no distension. Palpations: Abdomen is soft. Tenderness: There is no abdominal tenderness. Musculoskeletal:         General: No swelling, tenderness or deformity. Cervical back: Normal range of motion and neck supple. No rigidity or tenderness. Right lower leg: Edema present. Left lower leg: Edema present. Lymphadenopathy:      Cervical: No cervical adenopathy. Skin:     Coloration: Skin is not jaundiced. Findings: No bruising, erythema or lesion. Neurological:      General: No focal deficit present. Mental Status: She is alert and oriented to person, place, and time. Cranial Nerves: No cranial nerve deficit. Motor: No weakness. Gait: Gait normal.          This dictation was generated by voice recognition computer software. Although all attempts are made to edit the dictation for accuracy, there may be errors in the transcription that are not intended. An electronic signature was used to authenticate this note.     --Pavan Montalvo MD

## 2022-08-24 ENCOUNTER — TELEPHONE (OUTPATIENT)
Dept: BARIATRICS/WEIGHT MGMT | Age: 47
End: 2022-08-24

## 2022-09-01 ENCOUNTER — OFFICE VISIT (OUTPATIENT)
Dept: INTERNAL MEDICINE CLINIC | Age: 47
End: 2022-09-01
Payer: MEDICARE

## 2022-09-01 VITALS
BODY MASS INDEX: 51.1 KG/M2 | DIASTOLIC BLOOD PRESSURE: 88 MMHG | SYSTOLIC BLOOD PRESSURE: 138 MMHG | OXYGEN SATURATION: 99 % | WEIGHT: 293 LBS | HEART RATE: 90 BPM

## 2022-09-01 DIAGNOSIS — K74.69 OTHER CIRRHOSIS OF LIVER (HCC): ICD-10-CM

## 2022-09-01 DIAGNOSIS — I87.2 VENOUS STASIS DERMATITIS, UNSPECIFIED LATERALITY: Primary | ICD-10-CM

## 2022-09-01 DIAGNOSIS — F41.9 ANXIETY: ICD-10-CM

## 2022-09-01 DIAGNOSIS — L03.116 LEFT LEG CELLULITIS: ICD-10-CM

## 2022-09-01 LAB
A/G RATIO: 0.6 (ref 1.1–2.2)
ALBUMIN SERPL-MCNC: 3.3 G/DL (ref 3.4–5)
ALP BLD-CCNC: 204 U/L (ref 40–129)
ALT SERPL-CCNC: 25 U/L (ref 10–40)
ANION GAP SERPL CALCULATED.3IONS-SCNC: 8 MMOL/L (ref 3–16)
AST SERPL-CCNC: 53 U/L (ref 15–37)
BILIRUB SERPL-MCNC: 0.6 MG/DL (ref 0–1)
BUN BLDV-MCNC: 16 MG/DL (ref 7–20)
CALCIUM SERPL-MCNC: 8.9 MG/DL (ref 8.3–10.6)
CHLORIDE BLD-SCNC: 103 MMOL/L (ref 99–110)
CO2: 28 MMOL/L (ref 21–32)
CREAT SERPL-MCNC: 0.8 MG/DL (ref 0.6–1.1)
GFR AFRICAN AMERICAN: >60
GFR NON-AFRICAN AMERICAN: >60
GLUCOSE BLD-MCNC: 84 MG/DL (ref 70–99)
POTASSIUM SERPL-SCNC: 4.4 MMOL/L (ref 3.5–5.1)
SODIUM BLD-SCNC: 139 MMOL/L (ref 136–145)
TOTAL PROTEIN: 8.7 G/DL (ref 6.4–8.2)

## 2022-09-01 PROCEDURE — 99214 OFFICE O/P EST MOD 30 MIN: CPT | Performed by: INTERNAL MEDICINE

## 2022-09-01 RX ORDER — SPIRONOLACTONE 100 MG/1
100 TABLET, FILM COATED ORAL DAILY
Qty: 30 TABLET | Refills: 2 | Status: SHIPPED | OUTPATIENT
Start: 2022-09-01

## 2022-09-01 RX ORDER — SULFAMETHOXAZOLE AND TRIMETHOPRIM 800; 160 MG/1; MG/1
1 TABLET ORAL 2 TIMES DAILY
Qty: 20 TABLET | Refills: 0 | Status: SHIPPED | OUTPATIENT
Start: 2022-09-01 | End: 2022-09-11

## 2022-09-01 RX ORDER — BUSPIRONE HYDROCHLORIDE 5 MG/1
5 TABLET ORAL 2 TIMES DAILY
Qty: 60 TABLET | Refills: 0 | Status: SHIPPED | OUTPATIENT
Start: 2022-09-01

## 2022-09-01 RX ORDER — FUROSEMIDE 40 MG/1
40 TABLET ORAL DAILY
Qty: 30 TABLET | Refills: 0 | Status: SHIPPED | OUTPATIENT
Start: 2022-09-01

## 2022-09-01 RX ORDER — VENLAFAXINE HYDROCHLORIDE 150 MG/1
150 CAPSULE, EXTENDED RELEASE ORAL DAILY
Qty: 30 CAPSULE | Refills: 2 | Status: SHIPPED | OUTPATIENT
Start: 2022-09-01

## 2022-09-01 ASSESSMENT — ENCOUNTER SYMPTOMS
SHORTNESS OF BREATH: 0
BLURRED VISION: 0

## 2022-09-01 NOTE — PROGRESS NOTES
Vince Deutsch (:  1975) is a 49 y.o. female,Established patient, here for evaluation of the following chief complaint(s): Foot Problem (Red swelling, shoes won't fit.), Discuss Medications (Request refill/increased dose of gabapentin and buspirone.), and Knee Pain (Icy hot helps some times)         ASSESSMENT/PLAN:  1. Venous stasis dermatitis, unspecified laterality  -     Compression Stockings MISC; Starting Thu 2022, Disp-1 each, R-0, Print  2. Other cirrhosis of liver (HCC)  -     furosemide (LASIX) 40 MG tablet; Take 1 tablet by mouth daily, Disp-30 tablet, R-0Normal  -     spironolactone (ALDACTONE) 100 MG tablet; Take 1 tablet by mouth daily, Disp-30 tablet, R-2Normal  3. Left leg cellulitis  -     sulfamethoxazole-trimethoprim (BACTRIM DS;SEPTRA DS) 800-160 MG per tablet; Take 1 tablet by mouth 2 times daily for 10 days, Disp-20 tablet, R-0Normal  4. Anxiety  -     busPIRone (BUSPAR) 5 MG tablet; Take 1 tablet by mouth 2 times daily, Disp-60 tablet, R-0Normal  -     venlafaxine (EFFEXOR XR) 150 MG extended release capsule; Take 1 capsule by mouth daily, Disp-30 capsule, R-2Normal  The patient venous stasis has improved some I will recommend for patient to use compression stockings from now on    The patient cellulitis of the left leg has old but gone unfortunately now she is having increased redness on the right lower extremity we will get a put the patient on antibiotics and continue reducing her fluid load and try to prevent this from becoming more of a chronic affair    The patient anxiety is doing fairly well she is under a lot of stress given her multiple medical conditions we will get a continue the same medication as before.     Patient blood pressure is elevated on initial presentation after resting her it did improve but still in the upper limit of normal I think her multiple risk factors including the obesity discomfort and stress are contributing to this  Return in about 2 weeks (around 9/15/2022). Subjective   SUBJECTIVE/OBJECTIVE:    Lab Review   Lab Results   Component Value Date/Time     08/04/2022 01:25 PM     05/17/2022 12:06 PM     12/17/2020 06:50 PM    K 4.0 08/04/2022 01:25 PM    K 3.8 05/17/2022 12:06 PM    K 4.1 12/17/2020 06:50 PM    K 3.6 04/09/2019 05:54 AM    K 3.9 04/07/2019 07:02 AM    CO2 27 08/04/2022 01:25 PM    CO2 27 05/17/2022 12:06 PM    CO2 29 12/17/2020 06:50 PM    BUN 12 08/04/2022 01:25 PM    BUN 9 05/17/2022 12:06 PM    BUN 9 12/17/2020 06:50 PM    CREATININE 0.7 08/04/2022 01:25 PM    CREATININE 0.7 05/17/2022 12:06 PM    CREATININE 0.6 12/17/2020 06:50 PM    GLUCOSE 126 08/04/2022 01:25 PM    GLUCOSE 114 05/17/2022 12:06 PM    GLUCOSE 93 12/17/2020 06:50 PM    CALCIUM 8.5 08/04/2022 01:25 PM    CALCIUM 8.5 05/17/2022 12:06 PM    CALCIUM 9.1 12/17/2020 06:50 PM     Lab Results   Component Value Date/Time    WBC 5.9 05/17/2022 12:06 PM    WBC 9.1 12/17/2020 06:50 PM    WBC 4.3 04/09/2019 05:54 AM    HGB 13.5 05/17/2022 12:06 PM    HGB 12.9 12/17/2020 06:50 PM    HGB 12.3 04/09/2019 05:54 AM    HCT 39.5 05/17/2022 12:06 PM    HCT 37.2 12/17/2020 06:50 PM    HCT 35.8 04/09/2019 05:54 AM    MCV 98.0 05/17/2022 12:06 PM    MCV 98.5 12/17/2020 06:50 PM    MCV 99.2 04/09/2019 05:54 AM     08/01/2022 12:25 PM    PLT 89 05/17/2022 12:06 PM     12/17/2020 06:50 PM     Lab Results   Component Value Date/Time    CHOL 130 05/17/2022 12:06 PM    TRIG 95 05/17/2022 12:06 PM    HDL 46 05/17/2022 12:06 PM       Vitals 9/1/2022 9/1/2022 0/4/6137   SYSTOLIC 899 476 455   DIASTOLIC 88 90 90   Site Right Upper Arm - -   Position Sitting - -   Pulse - - 90   Temp - - -   Resp - - -   SpO2 - - 99   Weight - - 316 lb 9.6 oz   Height - - -   Body mass index - - -   Pain Level - - -   Some recent data might be hidden       rash    Foot Problem  Associated symptoms include a rash. Pertinent negatives include no chest pain or headaches.    Knee Pain Rash  This is a recurrent problem. The current episode started more than 1 month ago. The affected locations include the right lower leg. The rash is characterized by burning, dryness and pain. Pertinent negatives include no shortness of breath. Hypertension  This is a chronic problem. The current episode started more than 1 year ago. The problem is uncontrolled. Pertinent negatives include no anxiety, blurred vision, chest pain, headaches, malaise/fatigue, peripheral edema, PND, shortness of breath or sweats. Review of Systems   Constitutional:  Negative for malaise/fatigue. Eyes:  Negative for blurred vision. Respiratory:  Negative for shortness of breath. Cardiovascular:  Negative for chest pain and PND. Skin:  Positive for rash. Neurological:  Negative for headaches. Objective   Physical Exam  Vitals and nursing note reviewed. Constitutional:       General: She is not in acute distress. Appearance: Normal appearance. HENT:      Head: Normocephalic and atraumatic. Right Ear: Tympanic membrane normal.      Left Ear: Tympanic membrane normal.      Nose: Nose normal.   Eyes:      Extraocular Movements: Extraocular movements intact. Conjunctiva/sclera: Conjunctivae normal.      Pupils: Pupils are equal, round, and reactive to light. Neck:      Vascular: No carotid bruit. Cardiovascular:      Rate and Rhythm: Normal rate and regular rhythm. Pulses: Normal pulses. Heart sounds: No murmur heard. Pulmonary:      Effort: Pulmonary effort is normal. No respiratory distress. Breath sounds: Normal breath sounds. Abdominal:      General: Abdomen is flat. Bowel sounds are normal. There is no distension. Palpations: Abdomen is soft. Tenderness: There is no abdominal tenderness. Musculoskeletal:         General: No swelling, tenderness or deformity. Cervical back: Normal range of motion and neck supple. No rigidity or tenderness.       Right lower leg: Edema present. Left lower leg: Edema present. Lymphadenopathy:      Cervical: No cervical adenopathy. Skin:     Coloration: Skin is not jaundiced. Findings: No bruising, erythema or lesion. Neurological:      General: No focal deficit present. Mental Status: She is alert and oriented to person, place, and time. Cranial Nerves: No cranial nerve deficit. Motor: No weakness. Gait: Gait normal.          This dictation was generated by voice recognition computer software. Although all attempts are made to edit the dictation for accuracy, there may be errors in the transcription that are not intended. An electronic signature was used to authenticate this note.     --Clarisa Barnhart MD

## 2022-09-06 LAB
HBV QNT LOG, IU/ML: NOT DETECTED LOG IU/ML
HBV QNT, IU/ML: NOT DETECTED IU/ML
INTERPRETATION: NOT DETECTED

## 2022-09-13 ENCOUNTER — TELEPHONE (OUTPATIENT)
Dept: BARIATRICS/WEIGHT MGMT | Age: 47
End: 2022-09-13

## 2022-09-16 ENCOUNTER — TELEPHONE (OUTPATIENT)
Dept: INTERNAL MEDICINE CLINIC | Age: 47
End: 2022-09-16

## 2022-09-16 NOTE — TELEPHONE ENCOUNTER
----- Message from Meg Flores sent at 9/16/2022  3:36 PM EDT -----  Subject: Message to Provider    QUESTIONS  Information for Provider? Pt does not want to keep PCP Flores as her PCP. Pt states she and her family thinks she has gotten worse (has been seen   for all current symptoms by [facility]) and has had no improvement. Pt wants   to switch PCP's to another in the office. Pt would like to see a female   PCP going forward.   ---------------------------------------------------------------------------  --------------  Lennie LUNDBERG  [phone]; OK to leave message on voicemail  ---------------------------------------------------------------------------  --------------  SCRIPT ANSWERS  Relationship to Patient?  Self

## 2022-09-16 NOTE — TELEPHONE ENCOUNTER
1:1 with discussion of knee pain scheduled patient to see and discuss options with MD noted weight loss and fluid decrease noted may take time to get more weight off as patient anxious to have ortho consider knee surgery for patient \"I want to go to work!, I want to walk without pain! \".

## 2022-09-22 ENCOUNTER — OFFICE VISIT (OUTPATIENT)
Dept: INTERNAL MEDICINE CLINIC | Age: 47
End: 2022-09-22
Payer: MEDICARE

## 2022-09-22 VITALS
HEIGHT: 66 IN | SYSTOLIC BLOOD PRESSURE: 142 MMHG | HEART RATE: 81 BPM | WEIGHT: 293 LBS | BODY MASS INDEX: 47.09 KG/M2 | DIASTOLIC BLOOD PRESSURE: 84 MMHG | OXYGEN SATURATION: 97 %

## 2022-09-22 DIAGNOSIS — I10 PRIMARY HYPERTENSION: ICD-10-CM

## 2022-09-22 DIAGNOSIS — F41.9 ANXIETY: Primary | ICD-10-CM

## 2022-09-22 DIAGNOSIS — M25.561 PAIN IN BOTH KNEES, UNSPECIFIED CHRONICITY: ICD-10-CM

## 2022-09-22 DIAGNOSIS — M54.40 CHRONIC BILATERAL LOW BACK PAIN WITH SCIATICA, SCIATICA LATERALITY UNSPECIFIED: ICD-10-CM

## 2022-09-22 DIAGNOSIS — G89.29 CHRONIC BILATERAL LOW BACK PAIN WITH SCIATICA, SCIATICA LATERALITY UNSPECIFIED: ICD-10-CM

## 2022-09-22 DIAGNOSIS — M25.562 PAIN IN BOTH KNEES, UNSPECIFIED CHRONICITY: ICD-10-CM

## 2022-09-22 DIAGNOSIS — F51.01 PRIMARY INSOMNIA: ICD-10-CM

## 2022-09-22 DIAGNOSIS — G90.50 RSD (REFLEX SYMPATHETIC DYSTROPHY): ICD-10-CM

## 2022-09-22 PROCEDURE — 99214 OFFICE O/P EST MOD 30 MIN: CPT | Performed by: INTERNAL MEDICINE

## 2022-09-22 RX ORDER — AMLODIPINE BESYLATE 2.5 MG/1
2.5 TABLET ORAL DAILY
Qty: 90 TABLET | Refills: 1 | Status: SHIPPED | OUTPATIENT
Start: 2022-09-22

## 2022-09-22 RX ORDER — GABAPENTIN 400 MG/1
400 CAPSULE ORAL 3 TIMES DAILY
Qty: 90 CAPSULE | Refills: 0 | Status: SHIPPED | OUTPATIENT
Start: 2022-09-22 | End: 2022-10-18

## 2022-09-22 RX ORDER — AMITRIPTYLINE HYDROCHLORIDE 50 MG/1
50 TABLET, FILM COATED ORAL NIGHTLY
Qty: 90 TABLET | Refills: 1 | Status: SHIPPED | OUTPATIENT
Start: 2022-09-22

## 2022-09-22 ASSESSMENT — ENCOUNTER SYMPTOMS
SHORTNESS OF BREATH: 1
BLURRED VISION: 0
ORTHOPNEA: 0

## 2022-09-22 NOTE — PROGRESS NOTES
Beronica Hilton (:  1975) is a 49 y.o. female,Established patient, here for evaluation of the following chief complaint(s):  Follow-up         ASSESSMENT/PLAN:  1. Anxiety  -     amitriptyline (ELAVIL) 50 MG tablet; Take 1 tablet by mouth nightly, Disp-90 tablet, R-1Normal  2. Primary insomnia  -     amitriptyline (ELAVIL) 50 MG tablet; Take 1 tablet by mouth nightly, Disp-90 tablet, R-1Normal  3. Chronic bilateral low back pain with sciatica, sciatica laterality unspecified  -     gabapentin (NEURONTIN) 400 MG capsule; Take 1 capsule by mouth 3 times daily for 30 days. Intended supply: 90 days, Disp-90 capsule, R-0Normal  4. RSD (reflex sympathetic dystrophy)  5. Primary hypertension  -     amLODIPine (NORVASC) 2.5 MG tablet; Take 1 tablet by mouth daily, Disp-90 tablet, R-1Normal  6.  Pain in both knees, unspecified chronicity  The patient is having multiple medical issues at this stage that needs addressing    Unfortunately her lower extremity swelling and irritation continues to be the case I do not believe there is a cellulitis at this time which I think is mostly her stasis dermatitis in combination with the RSD so at this point we will get a continue local care and get a start her on amlodipine which will help with the blood pressure that is been elevated in addition to help with RSD    The patient is having significant arthritis of her knees given her weight she has not been a good candidate for the surgery I did offer the patient again to go to pain management to see if there is any other options but at this point she is still declining that    The patient is having quite a bit of back pain with sciatica ongoing increase her gabapentin from 300 to 400 mg and see if that would be helpful for her symptoms    Patient also has been having difficulty sleeping from the pain as well as other factors amitriptyline will be added to see if that will help improve both her sleeping as well has her pain  No follow-ups on file. Subjective   SUBJECTIVE/OBJECTIVE:    Lab Review   Lab Results   Component Value Date/Time     09/01/2022 03:53 PM     08/04/2022 01:25 PM     05/17/2022 12:06 PM    K 4.4 09/01/2022 03:53 PM    K 4.0 08/04/2022 01:25 PM    K 3.8 05/17/2022 12:06 PM    K 4.1 12/17/2020 06:50 PM    K 3.6 04/09/2019 05:54 AM    CO2 28 09/01/2022 03:53 PM    CO2 27 08/04/2022 01:25 PM    CO2 27 05/17/2022 12:06 PM    BUN 16 09/01/2022 03:53 PM    BUN 12 08/04/2022 01:25 PM    BUN 9 05/17/2022 12:06 PM    CREATININE 0.8 09/01/2022 03:53 PM    CREATININE 0.7 08/04/2022 01:25 PM    CREATININE 0.7 05/17/2022 12:06 PM    GLUCOSE 84 09/01/2022 03:53 PM    GLUCOSE 126 08/04/2022 01:25 PM    GLUCOSE 114 05/17/2022 12:06 PM    CALCIUM 8.9 09/01/2022 03:53 PM    CALCIUM 8.5 08/04/2022 01:25 PM    CALCIUM 8.5 05/17/2022 12:06 PM     Lab Results   Component Value Date/Time    WBC 5.9 05/17/2022 12:06 PM    WBC 9.1 12/17/2020 06:50 PM    WBC 4.3 04/09/2019 05:54 AM    HGB 13.5 05/17/2022 12:06 PM    HGB 12.9 12/17/2020 06:50 PM    HGB 12.3 04/09/2019 05:54 AM    HCT 39.5 05/17/2022 12:06 PM    HCT 37.2 12/17/2020 06:50 PM    HCT 35.8 04/09/2019 05:54 AM    MCV 98.0 05/17/2022 12:06 PM    MCV 98.5 12/17/2020 06:50 PM    MCV 99.2 04/09/2019 05:54 AM     08/01/2022 12:25 PM    PLT 89 05/17/2022 12:06 PM     12/17/2020 06:50 PM     Lab Results   Component Value Date/Time    CHOL 130 05/17/2022 12:06 PM    TRIG 95 05/17/2022 12:06 PM    HDL 46 05/17/2022 12:06 PM       Vitals 9/22/2022 9/1/2022 7/5/6752   SYSTOLIC 927 655 562   DIASTOLIC 84 88 90   Site - Right Upper Arm -   Position - Sitting -   Pulse 81 - -   Temp - - -   Resp - - -   SpO2 97 - -   Weight 344 lb 9.6 oz - -   Height 5' 6\" - -   Body mass index 55.61 kg/m2 - -   Pain Level - - -   Some recent data might be hidden       Knee Pain   The incident occurred more than 1 week ago.  Pertinent negatives include no inability to bear weight, loss of motion, loss of sensation, muscle weakness, numbness or tingling. Rash  This is a chronic problem. The rash is diffuse. Associated symptoms include shortness of breath. Hypertension  This is a new problem. The problem is uncontrolled. Associated symptoms include peripheral edema and shortness of breath. Pertinent negatives include no anxiety, blurred vision, chest pain, malaise/fatigue, neck pain, orthopnea or PND. Review of Systems   Constitutional:  Negative for malaise/fatigue. Eyes:  Negative for blurred vision. Respiratory:  Positive for shortness of breath. Cardiovascular:  Negative for chest pain, orthopnea and PND. Musculoskeletal:  Negative for neck pain. Skin:  Positive for rash. Neurological:  Negative for tingling and numbness. Objective   Physical Exam  Constitutional:       General: She is not in acute distress. Appearance: Normal appearance. HENT:      Head: Normocephalic and atraumatic. Right Ear: Tympanic membrane normal.      Left Ear: Tympanic membrane normal.      Nose: Nose normal.   Eyes:      Extraocular Movements: Extraocular movements intact. Conjunctiva/sclera: Conjunctivae normal.      Pupils: Pupils are equal, round, and reactive to light. Neck:      Vascular: No carotid bruit. Cardiovascular:      Rate and Rhythm: Normal rate and regular rhythm. Pulses: Normal pulses. Heart sounds: No murmur heard. Pulmonary:      Effort: Pulmonary effort is normal. No respiratory distress. Breath sounds: Normal breath sounds. Abdominal:      General: Abdomen is flat. Bowel sounds are normal. There is no distension. Palpations: Abdomen is soft. Tenderness: There is no abdominal tenderness. Musculoskeletal:         General: No swelling, tenderness or deformity. Cervical back: Normal range of motion and neck supple. No rigidity or tenderness. Right lower leg: No edema. Left lower leg: No edema. Lymphadenopathy:      Cervical: No cervical adenopathy. Skin:     Coloration: Skin is not jaundiced. Findings: No bruising, erythema or lesion. Neurological:      General: No focal deficit present. Mental Status: She is alert and oriented to person, place, and time. Cranial Nerves: No cranial nerve deficit. Motor: No weakness. Gait: Gait normal.          This dictation was generated by voice recognition computer software. Although all attempts are made to edit the dictation for accuracy, there may be errors in the transcription that are not intended. An electronic signature was used to authenticate this note.     --Anahy Chopra MD

## 2022-09-29 DIAGNOSIS — G90.50 RSD (REFLEX SYMPATHETIC DYSTROPHY): ICD-10-CM

## 2022-09-29 RX ORDER — GABAPENTIN 300 MG/1
CAPSULE ORAL
Qty: 90 CAPSULE | Refills: 0 | OUTPATIENT
Start: 2022-09-29

## 2022-10-18 DIAGNOSIS — G89.29 CHRONIC BILATERAL LOW BACK PAIN WITH SCIATICA, SCIATICA LATERALITY UNSPECIFIED: ICD-10-CM

## 2022-10-18 DIAGNOSIS — M54.40 CHRONIC BILATERAL LOW BACK PAIN WITH SCIATICA, SCIATICA LATERALITY UNSPECIFIED: ICD-10-CM

## 2022-10-18 RX ORDER — GABAPENTIN 400 MG/1
400 CAPSULE ORAL 3 TIMES DAILY
Qty: 90 CAPSULE | Refills: 0 | Status: SHIPPED | OUTPATIENT
Start: 2022-10-18 | End: 2022-11-21

## 2022-10-27 ENCOUNTER — APPOINTMENT (OUTPATIENT)
Dept: GENERAL RADIOLOGY | Age: 47
End: 2022-10-27
Payer: MEDICARE

## 2022-10-27 ENCOUNTER — HOSPITAL ENCOUNTER (EMERGENCY)
Age: 47
Discharge: HOME | End: 2022-10-28
Attending: EMERGENCY MEDICINE
Payer: MEDICARE

## 2022-10-27 VITALS
OXYGEN SATURATION: 99 % | RESPIRATION RATE: 17 BRPM | DIASTOLIC BLOOD PRESSURE: 79 MMHG | TEMPERATURE: 97.6 F | SYSTOLIC BLOOD PRESSURE: 129 MMHG | HEART RATE: 96 BPM

## 2022-10-27 DIAGNOSIS — T40.601A OPIATE OVERDOSE, ACCIDENTAL OR UNINTENTIONAL, INITIAL ENCOUNTER (HCC): Primary | ICD-10-CM

## 2022-10-27 DIAGNOSIS — S93.602A FOOT SPRAIN, LEFT, INITIAL ENCOUNTER: ICD-10-CM

## 2022-10-27 LAB
ANION GAP SERPL CALCULATED.3IONS-SCNC: 12 MMOL/L (ref 3–16)
BUN BLDV-MCNC: 10 MG/DL (ref 7–20)
CALCIUM SERPL-MCNC: 8.7 MG/DL (ref 8.3–10.6)
CHLORIDE BLD-SCNC: 103 MMOL/L (ref 99–110)
CO2: 23 MMOL/L (ref 21–32)
CREAT SERPL-MCNC: 0.8 MG/DL (ref 0.6–1.1)
GFR SERPL CREATININE-BSD FRML MDRD: >60 ML/MIN/{1.73_M2}
GLUCOSE BLD-MCNC: 123 MG/DL (ref 70–99)
POTASSIUM SERPL-SCNC: 3.6 MMOL/L (ref 3.5–5.1)
SODIUM BLD-SCNC: 138 MMOL/L (ref 136–145)

## 2022-10-27 PROCEDURE — 36415 COLL VENOUS BLD VENIPUNCTURE: CPT

## 2022-10-27 PROCEDURE — 73630 X-RAY EXAM OF FOOT: CPT

## 2022-10-27 PROCEDURE — 80048 BASIC METABOLIC PNL TOTAL CA: CPT

## 2022-10-27 PROCEDURE — 99284 EMERGENCY DEPT VISIT MOD MDM: CPT

## 2022-10-27 NOTE — Clinical Note
Markos Snyder was seen and treated in our emergency department on 10/27/2022. She may return to work on 10/29/2022. If you have any questions or concerns, please don't hesitate to call.       Liang Ricardo MD

## 2022-10-28 ASSESSMENT — ENCOUNTER SYMPTOMS
CHEST TIGHTNESS: 0
NAUSEA: 0
DIARRHEA: 0
ABDOMINAL PAIN: 0
VOMITING: 0
SHORTNESS OF BREATH: 0

## 2022-10-28 NOTE — ED PROVIDER NOTES
[address] [facility]        Pt Name: Paddy Rogel  MRN: [id number]  [place] 1975  Date of evaluation: 10/27/2022  Provider: SAHARA Rosas - CNP  PCP: Cait Santiago MD  Note Started: 1:59 AM EDT        I have seen and evaluated this patient with my supervising physician [facility]       Chief Complaint   Patient presents with    Drug Overdose     [address] - was found to be unconscious unresponsive with agonal respirations; given 2 doses of narcan at scene; PD came to ED : left leg was underneath her- unable to bear weight on it; (+) Good PMS       HISTORY OF PRESENT ILLNESS   (Location, Timing/Onset, Context/Setting, Quality, Duration, Modifying Factors, Severity, Associated Signs and Symptoms)  Note limiting factors. Chief Complaint: accidental overdose     Paddy Rogel is a 49 y.o. female who presents to the ER with accidental overdose, revived by narcan. Nursing Notes were all reviewed and agreed with or any disagreements were addressed in the HPI. REVIEW OF SYSTEMS    (2-9 systems for level 4, 10 or more for level 5)     Review of Systems   Constitutional:  Negative for activity change, chills and fever. Respiratory:  Negative for chest tightness and shortness of breath. Cardiovascular:  Negative for chest pain. Gastrointestinal:  Negative for abdominal pain, diarrhea, nausea and vomiting. Genitourinary:  Negative for dysuria. All other systems reviewed and are negative. Positives and Pertinent negatives as per HPI. Except as noted above in the ROS, all other systems were reviewed and negative. PAST MEDICAL HISTORY     Past Medical History:   Diagnosis Date    Anxiety     Arthritis     Asthma     COPD (chronic obstructive pulmonary disease) ([facility] [address] 6/3/2022    Depression     lost 2 children, apx 2019, 2012.     RSD lower limb     right foot         SURGICAL HISTORY Past Surgical History:   Procedure Laterality Date    ANKLE FRACTURE SURGERY      CARPAL TUNNEL RELEASE       SECTION      CHOLECYSTECTOMY      KNEE ARTHROSCOPY Bilateral     OVARY REMOVAL Right     TOTAL ANKLE ARTHROPLASTY      TUBAL LIGATION           [address]       Discharge Medication List as of 10/28/2022 12:33 AM        CONTINUE these medications which have NOT CHANGED    Details   gabapentin (NEURONTIN) 400 MG capsule Take 1 capsule by mouth 3 times daily for 30 days. , Disp-90 capsule, R-0Normal      amitriptyline (ELAVIL) 50 MG tablet Take 1 tablet by mouth nightly, Disp-90 tablet, R-1Normal      amLODIPine (NORVASC) 2.5 MG tablet Take 1 tablet by mouth daily, Disp-90 tablet, R-1Normal      furosemide (LASIX) 40 MG tablet Take 1 tablet by mouth daily, Disp-30 tablet, R-0Normal      spironolactone (ALDACTONE) 100 MG tablet Take 1 tablet by mouth daily, Disp-30 tablet, R-2Normal      Compression Stockings MISC Starting u 2022, Disp-1 each, R-0, Print      busPIRone (BUSPAR) 5 MG tablet Take 1 tablet by mouth 2 times daily, Disp-60 tablet, R-0Normal      venlafaxine (EFFEXOR XR) 150 MG extended release capsule Take 1 capsule by mouth daily, Disp-30 capsule, R-2Normal      triamcinolone (KENALOG) 0.1 % cream Apply topically 2 times daily. , Disp-160 g, R-1, Normal      umeclidinium-vilanterol (ANORO ELLIPTA) 62.5-25 MCG/INH AEPB inhaler Inhale 1 puff into the lungs daily, Disp-1 each, R-2Normal      diclofenac (VOLTAREN) 50 MG EC tablet Take 1 tablet by mouth 3 times daily (with meals), Disp-60 tablet, R-3Normal               ALLERGIES     Darvocet a500 [propoxyphene n-acetaminophen] and Penicillins    FAMILYHISTORY       Family History   Problem Relation Age of Onset    Depression Mother     Anxiety Disorder Mother     Arthritis Other     Asthma Other     Cancer Other     Diabetes Other     High Blood Pressure Other     High Blood Pressure Sister     Depression Sister     Anxiety Disorder Sister     Heart Disease Maternal Grandfather           SOCIAL HISTORY       Social History     Tobacco Use    Smoking status: Every Day     Packs/day: 0.50     Years: 35.00     Pack years: 17.50     Types: Cigarettes     Start date: 25    Smokeless tobacco: Never   Vaping Use    Vaping Use: Never used   Substance Use Topics    Alcohol use: No     Comment: 1x a year maybe     Drug use: Not Currently     Comment: in past with death of children        SCREENINGS    Awilda Coma Scale  Eye Opening: Spontaneous  Best Verbal Response: Oriented  Best Motor Response: Obeys commands  Awilda Coma Scale Score: 15        PHYSICAL EXAM    (up to 7 for level 4, 8 or more for level 5)     ED Triage Vitals [10/27/22 2134]   BP Temp Temp Source Heart Rate Resp SpO2 Height Weight   (!) 120/90 97.6 °F (36.4 °C) Oral 97 23 100 % -- --       Physical Exam  Vitals and nursing note reviewed. Constitutional:       Appearance: She is well-developed. She is not diaphoretic. HENT:      Head: Normocephalic and atraumatic. Right Ear: External ear normal.      Left Ear: External ear normal.   Eyes:      General:         Right eye: No discharge. Left eye: No discharge. Neck:      Vascular: No JVD. Cardiovascular:      Rate and Rhythm: Normal rate. Pulmonary:      Effort: Pulmonary effort is normal. No respiratory distress. Abdominal:      Palpations: Abdomen is soft. Musculoskeletal:         General: Normal range of motion. Skin:     General: Skin is warm and dry. Coloration: Skin is not pale. Neurological:      Mental Status: She is alert. Psychiatric:         Behavior: Behavior normal.       DIAGNOSTIC RESULTS   LABS:    Labs Reviewed   BASIC METABOLIC PANEL - Abnormal; Notable for the following components:       Result Value    Glucose 123 (*)     All other components within normal limits       When ordered only abnormal lab results are displayed.  All other labs were within normal range or not returned as of this dictation. EKG: When ordered, EKG's are interpreted by the Emergency Department Physician in the absence of a cardiologist.  Please see their note for interpretation of EKG. RADIOLOGY:   Non-plain film images such as CT, Ultrasound and MRI are read by the radiologist. Plain radiographic images are visualized and preliminarily interpreted by the ED Provider with the below findings:        Interpretation per the Radiologist below, if available at the time of this note:    XR FOOT LEFT (MIN 3 VIEWS)   Final Result   No acute fracture, dislocation, or destruction at the left foot. XR FOOT LEFT (MIN 3 VIEWS)    Result Date: 10/27/2022  EXAMINATION: THREE XRAY VIEWS OF THE LEFT FOOT 10/27/2022 10:07 pm COMPARISON: None. HISTORY: ORDERING SYSTEM PROVIDED HISTORY: pain TECHNOLOGIST PROVIDED HISTORY: Reason for exam:->pain Reason for Exam: pain FINDINGS: No acute fracture or dislocation at the left foot. There is a minimal hallux valgus angulation and mild arthritic changes of the 1st metatarsophalangeal joint. Tarsal metatarsal junction is intact. There are mild degenerative changes in the midfoot and small plantar calcaneal spur formation. No radiopaque foreign bodies or soft tissue emphysema. Edema and or large habitus around the ankle and calf. No acute fracture, dislocation, or destruction at the left foot. PROCEDURES   Unless otherwise noted below, none     Procedures    CRITICAL CARE TIME       CONSULTS:  None      EMERGENCY DEPARTMENT COURSE and DIFFERENTIAL DIAGNOSIS/MDM:   Vitals:    Vitals:    10/27/22 2134 10/27/22 2148 10/27/22 2158   BP: (!) 120/90 126/77 129/79   Pulse: 97 96 96   Resp: 23 15 17   Temp: 97.6 °F (36.4 °C)     TempSrc: Oral     SpO2: 100% 98% 99%       Patient was given the following medications:  Medications - No data to display      Is this patient to be included in the SEP-1 Core Measure due to severe sepsis or septic shock?    No Exclusion criteria - the patient is NOT to be included for SEP-1 Core Measure due to: Infection is not suspected    I was asked to follow up and discharge this patient at 3 hours of observation. She was awake and alert, able to bear weight on injured leg    Follow up with referral    Narcan prescribed at time of discharge    I estimate there is LOW risk for SUICIDAL BEHAVIOR, HOMICIDAL BEHAVIOR, PSYCHOSIS, DANGEROUS OR VIOLENT BEHAVIOR, DISORIENTATION, [address], thus I consider the discharge disposition reasonable. FINAL IMPRESSION      1. Opiate overdose, accidental or unintentional, initial encounter ([facility] [address]    2.  Foot sprain, left, initial encounter          DISPOSITION/PLAN   DISPOSITION Decision To Discharge 10/28/2022 12:32:00 AM      PATIENT REFERRED TO:  Alonso Biggs MD  [address]  [phone]    Schedule an appointment as soon as possible for a visit       [facility] Emergency Department  [facility] [address]  [phone]  Go to   If symptoms worsen      DISCHARGE MEDICATIONS:  Discharge Medication List as of 10/28/2022 12:33 AM          DISCONTINUED MEDICATIONS:  Discharge Medication List as of 10/28/2022 12:33 AM        STOP taking these medications       Naloxone HCl (NALOXONE OPIATE OVERDOSE KIT) Comments:   Reason for Stopping:                      (Please note that portions of this note were completed with a voice recognition program.  Efforts were made to edit the dictations but occasionally words are mis-transcribed.)    SAHARA Galeana CNP (electronically signed)           SAHARA Galeana CNP  10/28/22 0201

## 2022-10-28 NOTE — ED PROVIDER NOTES
[address] [facility] Emergency Department      Pt Name: Stephie Vega  MRN: [id number]  [place] 1975  Date of evaluation: 10/27/2022  Provider: Louis Isaacs MD  CHIEF COMPLAINT  Chief Complaint   Patient presents with    Drug Overdose     [address] [facility] [address] - was found to be unconscious unresponsive with agonal respirations; given 2 doses of narcan at scene; PD came to ED : left leg was underneath her- unable to bear weight on it; (+) Good PMS     HPI  Stephie Vega is a 49 y.o. female who presents because of overdose. She lives with a friend who called EMS providers. She was found unresponsive with agonal respirations and was given 2 doses of Narcan. She did wake up after the second dose. She does have a history of heroin abuse and admitted to the  that she last used about a month ago, snorting the drug. She denies any continued drug abuse. She says that she only remembers taking her normal medications this evening. Earlier, everything was fine and she denies any recent illness. She does have pain to her left foot and says that she was told that she was laying on it when the medics came to help her. She denies any other injury or areas of pain. Denies taking any extra doses of any of her medicines. REVIEW OF SYSTEMS:  No fever, no vomiting, no sob, no chest pain, no abdominal pain, no headache, postmenopausal Pertinent positives and negatives as per the HPI. All other review of systems reviewed and negative. Nursing notes reviewed. PAST MEDICAL HISTORY  Past Medical History:   Diagnosis Date    Anxiety     Arthritis     Asthma     COPD (chronic obstructive pulmonary disease) ([facility] [address] 6/3/2022    Depression     lost 2 children, apx 2012.     RSD lower limb     right foot     SURGICAL HISTORY  Past Surgical History:   Procedure Laterality Date    ANKLE FRACTURE SURGERY      CARPAL TUNNEL RELEASE       SECTION      CHOLECYSTECTOMY      KNEE ARTHROSCOPY Bilateral OVARY REMOVAL Right     TOTAL ANKLE ARTHROPLASTY      TUBAL LIGATION  1994     MEDICATIONS:  No current facility-administered medications on file prior to encounter. Current Outpatient Medications on File Prior to Encounter   Medication Sig Dispense Refill    gabapentin (NEURONTIN) 400 MG capsule Take 1 capsule by mouth 3 times daily for 30 days. 90 capsule 0    amitriptyline (ELAVIL) 50 MG tablet Take 1 tablet by mouth nightly 90 tablet 1    amLODIPine (NORVASC) 2.5 MG tablet Take 1 tablet by mouth daily 90 tablet 1    furosemide (LASIX) 40 MG tablet Take 1 tablet by mouth daily (Patient not taking: Reported on 9/22/2022) 30 tablet 0    spironolactone (ALDACTONE) 100 MG tablet Take 1 tablet by mouth daily 30 tablet 2    Compression Stockings MISC by Does not apply route 1 each 0    busPIRone (BUSPAR) 5 MG tablet Take 1 tablet by mouth 2 times daily 60 tablet 0    venlafaxine (EFFEXOR XR) 150 MG extended release capsule Take 1 capsule by mouth daily 30 capsule 2    triamcinolone (KENALOG) 0.1 % cream Apply topically 2 times daily.  160 g 1    umeclidinium-vilanterol (ANORO ELLIPTA) 62.5-25 MCG/INH AEPB inhaler Inhale 1 puff into the lungs daily 1 each 2    diclofenac (VOLTAREN) 50 MG EC tablet Take 1 tablet by mouth 3 times daily (with meals) 60 tablet 3     ALLERGIES  Darvocet a500 [propoxyphene n-acetaminophen] and Penicillins  FAMILY HISTORY  Family History   Problem Relation Age of Onset    Depression Mother     Anxiety Disorder Mother     Arthritis Other     Asthma Other     Cancer Other     Diabetes Other     High Blood Pressure Other     High Blood Pressure Sister     Depression Sister     Anxiety Disorder Sister     Heart Disease Maternal Grandfather      SOCIAL HISTORY:  Social History     Tobacco Use    Smoking status: Every Day     Packs/day: 0.50     Years: 35.00     Pack years: 17.50     Types: Cigarettes     Start date: 25    Smokeless tobacco: Never   Vaping Use    Vaping Use: Never used Substance Use Topics    Alcohol use: No     Comment: 1x a year maybe     Drug use: Not Currently     Comment: in past with death of children      IMMUNIZATIONS:  Noncontributory    PHYSICAL EXAM  VITAL SIGNS:  Blood pressure 129/79, pulse 96, temperature 97.6 °F (36.4 °C), temperature source Oral, resp. rate 17, SpO2 99 %, not currently breastfeeding. Constitutional:  49 y.o. female alert, cooperative, nontoxic  HENT:  Atraumatic, mucous membranes moist  Eyes:   Conjunctiva clear, no icterus  Neck:  Supple, no JVD, no signs of injury  Cardiovascular:  Regular, no rubs  Thorax & Lungs:  No accessory muscle usage, clear  Abdomen:  Soft, non distended, bowel sounds present, NT  Back:  No deformity  Genitalia:  Deferred  Rectal:  Deferred  Extremities:  No cyanosis, mild pedal edema (chronic per patient), tenderness to left foot without bruising at this time  Skin:  Warm, dry  Neurologic:  Alert, no slurred speech, no focal deficits noted  Psychiatric:  Affect appropriate    DIAGNOSTIC RESULTS:  Labs resulted at the time of this note reviewed. Labs Reviewed   BASIC METABOLIC PANEL - Abnormal; Notable for the following components:       Result Value    Glucose 123 (*)     All other components within normal limits     RADIOLOGY:  Plain x-rays were viewed by me:   XR FOOT LEFT (MIN 3 VIEWS)   Final Result   No acute fracture, dislocation, or destruction at the left foot. ED COURSE:  Uneventful     PROCEDURES:  None    CRITICAL CARE:  None    CONSULTATIONS:  None    MEDICAL DECISION MAKING: Debora Alas is a 49 y.o. female who presented because of overdose. The patient is not exhibiting any signs of respiratory depression requiring intervention during observation period thus far.   She has no apparent findings consistent with aspiration pneumonitis, pulmonary edema, endocarditis, sepsis, bacteremia, intentional overdose, etc.  We will likely discharge her with information on substance abuse resources available in the community. We informed her of her potential for dying or suffering permanent disability with continued use of drugs. Will give narcan kit as rescue intervention prior to EMS arrival should anything like this happen again though she denies that there is continued abuse problems. Debora Alas will be given appropriate discharge instructions. Referral to follow up provider. New Prescriptions    NALOXONE HCL (NALOXONE OPIATE OVERDOSE KIT)    1 each by Nasal route once for 1 dose     FOLLOW UP:    Robby Peres MD  [address]  [phone]    Schedule an appointment as soon as possible for a visit       [facility] Emergency Department  [address]  Go to   If symptoms worsen    FINAL IMPRESSION:    1. Opiate overdose, accidental or unintentional, initial encounter ([facility] [address]    2. Foot sprain, left, initial encounter        (Please note that I used voice recognition software to generate this note.   Occasionally words are mistranscribed despite my efforts to edit errors.)        Didier Alcaraz MD  10/27/22 5104

## 2022-10-28 NOTE — DISCHARGE INSTRUCTIONS
Outpatient Alcohol & Drug Treatment  [facility]    AAA-Detox & Treatment                       Narcotics Anonymous          [phone]                                        [phone]    AAAB Alcohol & Treatment                     [facility]  [phone]                                       [address]       Alcohol Treatment:                                     Inpatient Detox [facility]) [phone]  Outpatient- [phone] Rkbpsfa - [phone]    Substance Abuse Resources  Organization Phone Number [facility] (Substance Julien Vanegas 4163) www. findtreatment. sama.gov       Treatment facility    [facility] [address], [address], [address] Education and Prevention [address] [place] [address] Drive [phone]     National Information and referral   [facility] [phone]     [facility] Information and referral   [facility] [phone] [address] for [address], [address] programs    [facility] OASIS Program for Older Adults   [address] [phone] 50 [facility], 5 [facility],[address] Floor Alcohol Drug and Treatment Program [phone] 800 [facility], [address] [place] [address] [phone] [address], 1840 [facility] [phone] 1919 [facility], [address], [address] Several Locations.   Counseling support, treatment programs   [facility] [phone] x [address], [address] AND CHILDREN Recovery Programs    [address] Detox or [employer] [facility] [address] [place] [address], [address], [address] Intensive Outpatient Program Suboxone program Sept 2012   [facility] of [address]   [place], 6601 [facility] Drug and Alcohol Abuse Hotline [phone]     National Information and referral   [facility] [phone] [address], [address], [address], [address] inpatient residential program.  Must be willing to stay atleast 30 days. Can only take [employer] resident if patient is indigent. [address] [phone] [address] Miguel A, [address], [address] Recovery Programs, 1812 [facility] [phone] 838 [facility], [address]:   Alternative for Eluterio Bream Men [phone] [place] [address] [place], [place] Therisa Khat MEN ONLY:                    Information and Treatment Centers   [facility] [phone]     Therisa Khat Information and referral                 Alcohol/Chemical Abuse [place] [phone] dial \"0\"   Inderjit Every Outpatient Treatment   [facility] [address] [facility], 201 [facility] Outpatient Counseling and Treatment   [address] , 201 [facility] Out[facility] Treatment   [facility] Dario Tran Inpatient, no managed [facility] Counseling and [facility] [phone]   Therisa Khat, 5830 [facility] [phone]   [facility], 4300 [facility] Inpatient/Outpatient programs   [facility] [phone] ext 309 [facility], 201 [facility] Outpatient Counseling and Treatment   [facility] Centers [address] Information and Referral: Adolescents and Young Adults [facility] [phone]   [place], [address] 66 [facility], 201 [facility] Outpatient Counseling and Treatment   [address], 6330 [facility] [address]   Inpatient    [facility]itional Living [address] [place] [address] [phone] [address] [place] [address] Dr. Ashley Max IOP and Outpatient counseling

## 2022-11-18 DIAGNOSIS — M54.40 CHRONIC BILATERAL LOW BACK PAIN WITH SCIATICA, SCIATICA LATERALITY UNSPECIFIED: ICD-10-CM

## 2022-11-18 DIAGNOSIS — G89.29 CHRONIC BILATERAL LOW BACK PAIN WITH SCIATICA, SCIATICA LATERALITY UNSPECIFIED: ICD-10-CM

## 2022-11-21 RX ORDER — GABAPENTIN 400 MG/1
CAPSULE ORAL
Qty: 90 CAPSULE | Refills: 0 | Status: SHIPPED | OUTPATIENT
Start: 2022-11-21 | End: 2022-12-21

## 2023-01-01 ENCOUNTER — HOSPITAL ENCOUNTER (INPATIENT)
Age: 48
LOS: 2 days | End: 2023-02-17
Attending: EMERGENCY MEDICINE | Admitting: INTERNAL MEDICINE
Payer: COMMERCIAL

## 2023-01-01 ENCOUNTER — APPOINTMENT (OUTPATIENT)
Dept: GENERAL RADIOLOGY | Age: 48
End: 2023-01-01
Payer: COMMERCIAL

## 2023-01-01 ENCOUNTER — APPOINTMENT (OUTPATIENT)
Dept: CT IMAGING | Age: 48
End: 2023-01-01
Payer: COMMERCIAL

## 2023-01-01 ENCOUNTER — APPOINTMENT (OUTPATIENT)
Dept: INTERVENTIONAL RADIOLOGY/VASCULAR | Age: 48
End: 2023-01-01
Payer: COMMERCIAL

## 2023-01-01 VITALS
BODY MASS INDEX: 47.09 KG/M2 | DIASTOLIC BLOOD PRESSURE: 48 MMHG | HEIGHT: 66 IN | WEIGHT: 293 LBS | HEART RATE: 78 BPM | TEMPERATURE: 95.7 F | RESPIRATION RATE: 18 BRPM | SYSTOLIC BLOOD PRESSURE: 98 MMHG | OXYGEN SATURATION: 95 %

## 2023-01-01 DIAGNOSIS — F19.10 POLYSUBSTANCE ABUSE (HCC): ICD-10-CM

## 2023-01-01 DIAGNOSIS — E87.20 LACTIC ACIDOSIS: ICD-10-CM

## 2023-01-01 DIAGNOSIS — K74.69 OTHER CIRRHOSIS OF LIVER (HCC): ICD-10-CM

## 2023-01-01 DIAGNOSIS — E72.20 HYPERAMMONEMIA (HCC): ICD-10-CM

## 2023-01-01 DIAGNOSIS — N30.00 ACUTE CYSTITIS WITHOUT HEMATURIA: ICD-10-CM

## 2023-01-01 DIAGNOSIS — N17.9 AKI (ACUTE KIDNEY INJURY) (HCC): ICD-10-CM

## 2023-01-01 DIAGNOSIS — J96.01 ACUTE HYPOXEMIC RESPIRATORY FAILURE (HCC): Primary | ICD-10-CM

## 2023-01-01 DIAGNOSIS — U07.1 COVID-19: ICD-10-CM

## 2023-01-01 DIAGNOSIS — R60.1 ANASARCA: ICD-10-CM

## 2023-01-01 DIAGNOSIS — G93.1 ANOXIC BRAIN INJURY (HCC): ICD-10-CM

## 2023-01-01 DIAGNOSIS — T40.601A OPIATE OVERDOSE, ACCIDENTAL OR UNINTENTIONAL, INITIAL ENCOUNTER (HCC): ICD-10-CM

## 2023-01-01 LAB
A/G RATIO: 0.6 (ref 1.1–2.2)
ACETAMINOPHEN LEVEL: <5 UG/ML (ref 10–30)
AFP: 4.9 UG/L
ALBUMIN SERPL-MCNC: 2.4 G/DL (ref 3.4–5)
ALBUMIN SERPL-MCNC: 2.5 G/DL (ref 3.4–5)
ALBUMIN SERPL-MCNC: 2.6 G/DL (ref 3.4–5)
ALBUMIN SERPL-MCNC: 2.8 G/DL (ref 3.4–5)
ALP BLD-CCNC: 157 U/L (ref 40–129)
ALP BLD-CCNC: 179 U/L (ref 40–129)
ALP BLD-CCNC: 209 U/L (ref 40–129)
ALP BLD-CCNC: 276 U/L (ref 40–129)
ALT SERPL-CCNC: 27 U/L (ref 10–40)
ALT SERPL-CCNC: 36 U/L (ref 10–40)
ALT SERPL-CCNC: 47 U/L (ref 10–40)
ALT SERPL-CCNC: 49 U/L (ref 10–40)
AMMONIA: 75 UMOL/L (ref 11–51)
AMPHETAMINE SCREEN, URINE: POSITIVE
ANION GAP SERPL CALCULATED.3IONS-SCNC: 11 MMOL/L (ref 3–16)
ANION GAP SERPL CALCULATED.3IONS-SCNC: 13 MMOL/L (ref 3–16)
ANION GAP SERPL CALCULATED.3IONS-SCNC: 17 MMOL/L (ref 3–16)
ANION GAP SERPL CALCULATED.3IONS-SCNC: 6 MMOL/L (ref 3–16)
ANISOCYTOSIS: ABNORMAL
APPEARANCE FLUID: CLEAR
APTT: 44.5 SEC (ref 23–34.3)
AST SERPL-CCNC: 105 U/L (ref 15–37)
AST SERPL-CCNC: 131 U/L (ref 15–37)
AST SERPL-CCNC: 143 U/L (ref 15–37)
AST SERPL-CCNC: 46 U/L (ref 15–37)
BACTERIA: ABNORMAL /HPF
BANDED NEUTROPHILS RELATIVE PERCENT: 2 % (ref 0–7)
BARBITURATE SCREEN URINE: ABNORMAL
BASE EXCESS ARTERIAL: -0.8 MMOL/L (ref -3–3)
BASE EXCESS ARTERIAL: -10 (ref -3–3)
BASE EXCESS ARTERIAL: 1.5 MMOL/L (ref -3–3)
BASE EXCESS VENOUS: -2.7 MMOL/L (ref -3–3)
BASOPHILS ABSOLUTE: 0 K/UL (ref 0–0.2)
BASOPHILS ABSOLUTE: 0.1 K/UL (ref 0–0.2)
BASOPHILS RELATIVE PERCENT: 0 %
BASOPHILS RELATIVE PERCENT: 0.3 %
BASOPHILS RELATIVE PERCENT: 0.4 %
BASOPHILS RELATIVE PERCENT: 1.4 %
BENZODIAZEPINE SCREEN, URINE: ABNORMAL
BILIRUB SERPL-MCNC: 0.7 MG/DL (ref 0–1)
BILIRUB SERPL-MCNC: 0.9 MG/DL (ref 0–1)
BILIRUB SERPL-MCNC: 1.1 MG/DL (ref 0–1)
BILIRUB SERPL-MCNC: 1.1 MG/DL (ref 0–1)
BILIRUBIN URINE: ABNORMAL
BLOOD, URINE: ABNORMAL
BUN BLDV-MCNC: 13 MG/DL (ref 7–20)
BUN BLDV-MCNC: 21 MG/DL (ref 7–20)
BUN BLDV-MCNC: 7 MG/DL (ref 7–20)
BUN BLDV-MCNC: 9 MG/DL (ref 7–20)
C-REACTIVE PROTEIN: 14.9 MG/L (ref 0–5.1)
CALCIUM IONIZED: 1.19 MMOL/L (ref 1.12–1.32)
CALCIUM OXALATE CRYSTALS: PRESENT
CALCIUM SERPL-MCNC: 8.2 MG/DL (ref 8.3–10.6)
CALCIUM SERPL-MCNC: 8.4 MG/DL (ref 8.3–10.6)
CALCIUM SERPL-MCNC: 8.5 MG/DL (ref 8.3–10.6)
CALCIUM SERPL-MCNC: 8.8 MG/DL (ref 8.3–10.6)
CANNABINOID SCREEN URINE: ABNORMAL
CARBOXYHEMOGLOBIN ARTERIAL: 1.3 % (ref 0–1.5)
CARBOXYHEMOGLOBIN ARTERIAL: 1.5 % (ref 0–1.5)
CARBOXYHEMOGLOBIN: 4.8 % (ref 0–1.5)
CELL COUNT FLUID TYPE: NORMAL
CHLORIDE BLD-SCNC: 103 MMOL/L (ref 99–110)
CHLORIDE BLD-SCNC: 104 MMOL/L (ref 99–110)
CHLORIDE BLD-SCNC: 108 MMOL/L (ref 99–110)
CHLORIDE BLD-SCNC: 99 MMOL/L (ref 99–110)
CLARITY: ABNORMAL
CLOT EVALUATION: NORMAL
CO2: 20 MMOL/L (ref 21–32)
CO2: 22 MMOL/L (ref 21–32)
CO2: 26 MMOL/L (ref 21–32)
CO2: 29 MMOL/L (ref 21–32)
COCAINE METABOLITE SCREEN URINE: ABNORMAL
COLOR FLUID: NORMAL
COLOR: ABNORMAL
CREAT SERPL-MCNC: 0.7 MG/DL (ref 0.6–1.1)
CREAT SERPL-MCNC: 1.2 MG/DL (ref 0.6–1.1)
CREAT SERPL-MCNC: 1.3 MG/DL (ref 0.6–1.1)
CREAT SERPL-MCNC: 1.3 MG/DL (ref 0.6–1.1)
EKG ATRIAL RATE: 100 BPM
EKG DIAGNOSIS: NORMAL
EKG P AXIS: 61 DEGREES
EKG P-R INTERVAL: 140 MS
EKG Q-T INTERVAL: 374 MS
EKG QRS DURATION: 82 MS
EKG QTC CALCULATION (BAZETT): 482 MS
EKG R AXIS: 45 DEGREES
EKG T AXIS: 5 DEGREES
EKG VENTRICULAR RATE: 100 BPM
EOSINOPHILS ABSOLUTE: 0 K/UL (ref 0–0.6)
EOSINOPHILS ABSOLUTE: 0.3 K/UL (ref 0–0.6)
EOSINOPHILS RELATIVE PERCENT: 0 %
EOSINOPHILS RELATIVE PERCENT: 0 %
EOSINOPHILS RELATIVE PERCENT: 0.2 %
EOSINOPHILS RELATIVE PERCENT: 3.1 %
EPITHELIAL CELLS, UA: 25 /HPF (ref 0–5)
ETHANOL: NORMAL MG/DL (ref 0–0.08)
FENTANYL SCREEN, URINE: POSITIVE
FERRITIN: 379.5 NG/ML (ref 15–150)
FIBRINOGEN: 195 MG/DL (ref 207–509)
GFR SERPL CREATININE-BSD FRML MDRD: 50 ML/MIN/{1.73_M2}
GFR SERPL CREATININE-BSD FRML MDRD: 50 ML/MIN/{1.73_M2}
GFR SERPL CREATININE-BSD FRML MDRD: 55 ML/MIN/{1.73_M2}
GFR SERPL CREATININE-BSD FRML MDRD: >60 ML/MIN/{1.73_M2}
GLUCOSE BLD-MCNC: 116 MG/DL (ref 70–99)
GLUCOSE BLD-MCNC: 130 MG/DL (ref 70–99)
GLUCOSE BLD-MCNC: 140 MG/DL (ref 70–99)
GLUCOSE BLD-MCNC: 157 MG/DL (ref 70–99)
GLUCOSE BLD-MCNC: 187 MG/DL (ref 70–99)
GLUCOSE BLD-MCNC: 93 MG/DL (ref 70–99)
GLUCOSE URINE: 100 MG/DL
GRANULAR CASTS: PRESENT
HAV IGM SER IA-ACNC: ABNORMAL
HCG QUALITATIVE: NEGATIVE
HCO3 ARTERIAL: 20 MMOL/L (ref 21–29)
HCO3 ARTERIAL: 25.5 MMOL/L (ref 21–29)
HCO3 ARTERIAL: 27.6 MMOL/L (ref 21–29)
HCO3 VENOUS: 23.2 MMOL/L (ref 23–29)
HCT VFR BLD CALC: 35 % (ref 36–48)
HCT VFR BLD CALC: 36 % (ref 36–48)
HCT VFR BLD CALC: 39 % (ref 36–48)
HCT VFR BLD CALC: 41.9 % (ref 36–48)
HEMOGLOBIN, ART, EXTENDED: 11.9 G/DL (ref 12–16)
HEMOGLOBIN, ART, EXTENDED: 12 G/DL (ref 12–16)
HEMOGLOBIN: 11.7 G/DL (ref 12–16)
HEMOGLOBIN: 11.9 G/DL (ref 12–16)
HEMOGLOBIN: 12.5 G/DL (ref 12–16)
HEMOGLOBIN: 13.8 G/DL (ref 12–16)
HEMOGLOBIN: 14.5 GM/DL (ref 12–16)
HEPATITIS B CORE IGM ANTIBODY: ABNORMAL
HEPATITIS B SURFACE ANTIGEN INTERPRETATION: REACTIVE
HEPATITIS C ANTIBODY INTERPRETATION: REACTIVE
HYALINE CASTS: 68 /LPF (ref 0–8)
HYALINE CASTS: PRESENT
INR BLD: 1.52 (ref 0.87–1.14)
KETONES, URINE: NEGATIVE MG/DL
L. PNEUMOPHILA SEROGP 1 UR AG: NORMAL
LACTATE DEHYDROGENASE: 398 U/L (ref 100–190)
LACTATE: 9.25 MMOL/L (ref 0.4–2)
LACTIC ACID, SEPSIS: 3.4 MMOL/L (ref 0.4–1.9)
LACTIC ACID, SEPSIS: 5.5 MMOL/L (ref 0.4–1.9)
LACTIC ACID: 1.5 MMOL/L (ref 0.4–2)
LACTIC ACID: 7 MMOL/L (ref 0.4–2)
LEUKOCYTE ESTERASE, URINE: ABNORMAL
LIPASE: 30 U/L (ref 13–60)
LYMPHOCYTES ABSOLUTE: 0.8 K/UL (ref 1–5.1)
LYMPHOCYTES ABSOLUTE: 0.9 K/UL (ref 1–5.1)
LYMPHOCYTES ABSOLUTE: 1.1 K/UL (ref 1–5.1)
LYMPHOCYTES ABSOLUTE: 4.1 K/UL (ref 1–5.1)
LYMPHOCYTES RELATIVE PERCENT: 10 %
LYMPHOCYTES RELATIVE PERCENT: 12.8 %
LYMPHOCYTES RELATIVE PERCENT: 43.2 %
LYMPHOCYTES RELATIVE PERCENT: 7 %
LYMPHOCYTES, BODY FLUID: 90 %
Lab: ABNORMAL
MACROCYTES: ABNORMAL
MACROPHAGE FLUID: 4 %
MAGNESIUM: 2 MG/DL (ref 1.8–2.4)
MCH RBC QN AUTO: 32.9 PG (ref 26–34)
MCH RBC QN AUTO: 33.2 PG (ref 26–34)
MCH RBC QN AUTO: 33.2 PG (ref 26–34)
MCH RBC QN AUTO: 33.9 PG (ref 26–34)
MCHC RBC AUTO-ENTMCNC: 32.1 G/DL (ref 31–36)
MCHC RBC AUTO-ENTMCNC: 32.6 G/DL (ref 31–36)
MCHC RBC AUTO-ENTMCNC: 33 G/DL (ref 31–36)
MCHC RBC AUTO-ENTMCNC: 33.9 G/DL (ref 31–36)
MCV RBC AUTO: 100 FL (ref 80–100)
MCV RBC AUTO: 101.9 FL (ref 80–100)
MCV RBC AUTO: 103.5 FL (ref 80–100)
MCV RBC AUTO: 99.6 FL (ref 80–100)
METHADONE SCREEN, URINE: ABNORMAL
METHEMOGLOBIN ARTERIAL: 0.6 %
METHEMOGLOBIN ARTERIAL: 0.6 %
METHEMOGLOBIN VENOUS: 0.4 %
MICROSCOPIC EXAMINATION: YES
MONOCYTES ABSOLUTE: 0.5 K/UL (ref 0–1.3)
MONOCYTES ABSOLUTE: 0.5 K/UL (ref 0–1.3)
MONOCYTES ABSOLUTE: 0.8 K/UL (ref 0–1.3)
MONOCYTES ABSOLUTE: 0.8 K/UL (ref 0–1.3)
MONOCYTES RELATIVE PERCENT: 5.7 %
MONOCYTES RELATIVE PERCENT: 5.8 %
MONOCYTES RELATIVE PERCENT: 7 %
MONOCYTES RELATIVE PERCENT: 8.5 %
NEUTROPHIL, FLUID: 6 %
NEUTROPHILS ABSOLUTE: 10 K/UL (ref 1.7–7.7)
NEUTROPHILS ABSOLUTE: 4.1 K/UL (ref 1.7–7.7)
NEUTROPHILS ABSOLUTE: 7.2 K/UL (ref 1.7–7.7)
NEUTROPHILS ABSOLUTE: 7.7 K/UL (ref 1.7–7.7)
NEUTROPHILS RELATIVE PERCENT: 43.8 %
NEUTROPHILS RELATIVE PERCENT: 80.9 %
NEUTROPHILS RELATIVE PERCENT: 83.9 %
NEUTROPHILS RELATIVE PERCENT: 84 %
NITRITE, URINE: NEGATIVE
NUCLEATED CELLS FLUID: 238 /CUMM
NUMBER OF CELLS COUNTED FLUID: 100
O2 CONTENT, VEN: 16 VOL %
O2 SAT, ARTERIAL: 96.5 %
O2 SAT, ARTERIAL: 96.9 %
O2 SAT, ARTERIAL: 99 % (ref 93–100)
O2 SAT, VEN: 98 %
O2 THERAPY: ABNORMAL
OPIATE SCREEN URINE: ABNORMAL
ORGANISM: ABNORMAL
OVALOCYTES: ABNORMAL
OXYCODONE URINE: ABNORMAL
PCO2 ARTERIAL: 48 MMHG (ref 35–45)
PCO2 ARTERIAL: 48.7 MMHG (ref 35–45)
PCO2 ARTERIAL: 70.3 MM HG (ref 35–45)
PCO2, VEN: 43.5 MMHG (ref 40–50)
PDW BLD-RTO: 16.1 % (ref 12.4–15.4)
PDW BLD-RTO: 17.2 % (ref 12.4–15.4)
PDW BLD-RTO: 17.3 % (ref 12.4–15.4)
PDW BLD-RTO: 18.2 % (ref 12.4–15.4)
PERFORMED ON: ABNORMAL
PERFORMED ON: ABNORMAL
PH ARTERIAL: 7.06 (ref 7.35–7.45)
PH ARTERIAL: 7.33 (ref 7.35–7.45)
PH ARTERIAL: 7.36 (ref 7.35–7.45)
PH UA: 5.5
PH UA: 5.5 (ref 5–8)
PH VENOUS: 7.33 (ref 7.35–7.45)
PHENCYCLIDINE SCREEN URINE: ABNORMAL
PLATELET # BLD: 122 K/UL (ref 135–450)
PLATELET # BLD: 136 K/UL (ref 135–450)
PLATELET # BLD: 97 K/UL (ref 135–450)
PLATELET # BLD: 97 K/UL (ref 135–450)
PMV BLD AUTO: 10.5 FL (ref 5–10.5)
PMV BLD AUTO: 8.6 FL (ref 5–10.5)
PMV BLD AUTO: 9 FL (ref 5–10.5)
PMV BLD AUTO: 9.3 FL (ref 5–10.5)
PO2 ARTERIAL: 182.7 MM HG (ref 75–108)
PO2 ARTERIAL: 83.4 MMHG (ref 75–108)
PO2 ARTERIAL: 85.3 MMHG (ref 75–108)
PO2, VEN: 86.5 MMHG (ref 25–40)
POC HEMATOCRIT: 43 % (ref 36–48)
POC POTASSIUM: 3.3 MMOL/L (ref 3.5–5.1)
POC SAMPLE TYPE: ABNORMAL
POC SODIUM: 140 MMOL/L (ref 136–145)
POTASSIUM REFLEX MAGNESIUM: 3.1 MMOL/L (ref 3.5–5.1)
POTASSIUM REFLEX MAGNESIUM: 3.6 MMOL/L (ref 3.5–5.1)
POTASSIUM REFLEX MAGNESIUM: 4.8 MMOL/L (ref 3.5–5.1)
POTASSIUM SERPL-SCNC: 3.1 MMOL/L (ref 3.5–5.1)
PRO-BNP: 336 PG/ML (ref 0–124)
PROCALCITONIN: 0.79 NG/ML (ref 0–0.15)
PROCALCITONIN: 0.8 NG/ML (ref 0–0.15)
PROCALCITONIN: 2.18 NG/ML (ref 0–0.15)
PROTEIN UA: 300 MG/DL
PROTHROMBIN TIME: 18.3 SEC (ref 11.7–14.5)
RAPID INFLUENZA  B AGN: NEGATIVE
RAPID INFLUENZA A AGN: NEGATIVE
RBC # BLD: 3.5 M/UL (ref 4–5.2)
RBC # BLD: 3.53 M/UL (ref 4–5.2)
RBC # BLD: 3.77 M/UL (ref 4–5.2)
RBC # BLD: 4.21 M/UL (ref 4–5.2)
RBC FLUID: <1000 /CUMM
RBC UA: 100 /HPF (ref 0–4)
SALICYLATE, SERUM: <0.3 MG/DL (ref 15–30)
SARS-COV-2, NAAT: DETECTED
SEDIMENTATION RATE, ERYTHROCYTE: 69 MM/HR (ref 0–20)
SLIDE REVIEW: ABNORMAL
SODIUM BLD-SCNC: 136 MMOL/L (ref 136–145)
SODIUM BLD-SCNC: 139 MMOL/L (ref 136–145)
SODIUM BLD-SCNC: 140 MMOL/L (ref 136–145)
SODIUM BLD-SCNC: 143 MMOL/L (ref 136–145)
SPECIFIC GRAVITY UA: 1.01 (ref 1–1.03)
STREP PNEUMONIAE ANTIGEN, URINE: NORMAL
TCO2 ARTERIAL: 22 MMOL/L
TCO2 ARTERIAL: 60.4 MMOL/L
TCO2 ARTERIAL: 65.1 MMOL/L
TCO2 CALC VENOUS: 55 MMOL/L
TOTAL CK: 485 U/L (ref 26–192)
TOTAL PROTEIN: 6.6 G/DL (ref 6.4–8.2)
TOTAL PROTEIN: 6.8 G/DL (ref 6.4–8.2)
TOTAL PROTEIN: 6.8 G/DL (ref 6.4–8.2)
TOTAL PROTEIN: 7.5 G/DL (ref 6.4–8.2)
TROPONIN: 0.06 NG/ML
TROPONIN: 0.06 NG/ML
TROPONIN: 0.07 NG/ML
URINE CULTURE, ROUTINE: ABNORMAL
URINE REFLEX TO CULTURE: YES
URINE TYPE: ABNORMAL
UROBILINOGEN, URINE: 1 E.U./DL
VITAMIN D 25-HYDROXY: 8.8 NG/ML
WBC # BLD: 11.6 K/UL (ref 4–11)
WBC # BLD: 8.9 K/UL (ref 4–11)
WBC # BLD: 9.2 K/UL (ref 4–11)
WBC # BLD: 9.4 K/UL (ref 4–11)
WBC UA: 202 /HPF (ref 0–5)

## 2023-01-01 PROCEDURE — 94002 VENT MGMT INPAT INIT DAY: CPT

## 2023-01-01 PROCEDURE — 83605 ASSAY OF LACTIC ACID: CPT

## 2023-01-01 PROCEDURE — 85610 PROTHROMBIN TIME: CPT

## 2023-01-01 PROCEDURE — 82550 ASSAY OF CK (CPK): CPT

## 2023-01-01 PROCEDURE — 87077 CULTURE AEROBIC IDENTIFY: CPT

## 2023-01-01 PROCEDURE — C1729 CATH, DRAINAGE: HCPCS

## 2023-01-01 PROCEDURE — 80053 COMPREHEN METABOLIC PANEL: CPT

## 2023-01-01 PROCEDURE — 82803 BLOOD GASES ANY COMBINATION: CPT

## 2023-01-01 PROCEDURE — 0W9G3ZZ DRAINAGE OF PERITONEAL CAVITY, PERCUTANEOUS APPROACH: ICD-10-PCS | Performed by: STUDENT IN AN ORGANIZED HEALTH CARE EDUCATION/TRAINING PROGRAM

## 2023-01-01 PROCEDURE — 87040 BLOOD CULTURE FOR BACTERIA: CPT

## 2023-01-01 PROCEDURE — 84484 ASSAY OF TROPONIN QUANT: CPT

## 2023-01-01 PROCEDURE — 93010 ELECTROCARDIOGRAM REPORT: CPT | Performed by: INTERNAL MEDICINE

## 2023-01-01 PROCEDURE — 94761 N-INVAS EAR/PLS OXIMETRY MLT: CPT

## 2023-01-01 PROCEDURE — 86140 C-REACTIVE PROTEIN: CPT

## 2023-01-01 PROCEDURE — 2500000003 HC RX 250 WO HCPCS: Performed by: INTERNAL MEDICINE

## 2023-01-01 PROCEDURE — 6360000002 HC RX W HCPCS: Performed by: EMERGENCY MEDICINE

## 2023-01-01 PROCEDURE — 2580000003 HC RX 258: Performed by: PHYSICIAN ASSISTANT

## 2023-01-01 PROCEDURE — 6360000002 HC RX W HCPCS: Performed by: INTERNAL MEDICINE

## 2023-01-01 PROCEDURE — 6370000000 HC RX 637 (ALT 250 FOR IP): Performed by: PHYSICIAN ASSISTANT

## 2023-01-01 PROCEDURE — 87186 SC STD MICRODIL/AGAR DIL: CPT

## 2023-01-01 PROCEDURE — 2700000000 HC OXYGEN THERAPY PER DAY

## 2023-01-01 PROCEDURE — 87449 NOS EACH ORGANISM AG IA: CPT

## 2023-01-01 PROCEDURE — 80074 ACUTE HEPATITIS PANEL: CPT

## 2023-01-01 PROCEDURE — 83615 LACTATE (LD) (LDH) ENZYME: CPT

## 2023-01-01 PROCEDURE — 2580000003 HC RX 258: Performed by: INTERNAL MEDICINE

## 2023-01-01 PROCEDURE — 71045 X-RAY EXAM CHEST 1 VIEW: CPT

## 2023-01-01 PROCEDURE — 95822 EEG COMA OR SLEEP ONLY: CPT | Performed by: PSYCHIATRY & NEUROLOGY

## 2023-01-01 PROCEDURE — 85025 COMPLETE CBC W/AUTO DIFF WBC: CPT

## 2023-01-01 PROCEDURE — 87086 URINE CULTURE/COLONY COUNT: CPT

## 2023-01-01 PROCEDURE — 94640 AIRWAY INHALATION TREATMENT: CPT

## 2023-01-01 PROCEDURE — 87070 CULTURE OTHR SPECIMN AEROBIC: CPT

## 2023-01-01 PROCEDURE — 83880 ASSAY OF NATRIURETIC PEPTIDE: CPT

## 2023-01-01 PROCEDURE — 0BH17EZ INSERTION OF ENDOTRACHEAL AIRWAY INTO TRACHEA, VIA NATURAL OR ARTIFICIAL OPENING: ICD-10-PCS | Performed by: FAMILY MEDICINE

## 2023-01-01 PROCEDURE — 81001 URINALYSIS AUTO W/SCOPE: CPT

## 2023-01-01 PROCEDURE — 71250 CT THORAX DX C-: CPT

## 2023-01-01 PROCEDURE — 82330 ASSAY OF CALCIUM: CPT

## 2023-01-01 PROCEDURE — 99291 CRITICAL CARE FIRST HOUR: CPT | Performed by: INTERNAL MEDICINE

## 2023-01-01 PROCEDURE — 96374 THER/PROPH/DIAG INJ IV PUSH: CPT

## 2023-01-01 PROCEDURE — 84145 PROCALCITONIN (PCT): CPT

## 2023-01-01 PROCEDURE — 84703 CHORIONIC GONADOTROPIN ASSAY: CPT

## 2023-01-01 PROCEDURE — 1200000000 HC SEMI PRIVATE

## 2023-01-01 PROCEDURE — 86707 HEPATITIS BE ANTIBODY: CPT

## 2023-01-01 PROCEDURE — 5A1945Z RESPIRATORY VENTILATION, 24-96 CONSECUTIVE HOURS: ICD-10-PCS | Performed by: FAMILY MEDICINE

## 2023-01-01 PROCEDURE — 89051 BODY FLUID CELL COUNT: CPT

## 2023-01-01 PROCEDURE — 80143 DRUG ASSAY ACETAMINOPHEN: CPT

## 2023-01-01 PROCEDURE — 87015 SPECIMEN INFECT AGNT CONCNTJ: CPT

## 2023-01-01 PROCEDURE — 84132 ASSAY OF SERUM POTASSIUM: CPT

## 2023-01-01 PROCEDURE — 87350 HEPATITIS BE AG IA: CPT

## 2023-01-01 PROCEDURE — 80307 DRUG TEST PRSMV CHEM ANLYZR: CPT

## 2023-01-01 PROCEDURE — 70450 CT HEAD/BRAIN W/O DYE: CPT

## 2023-01-01 PROCEDURE — 6370000000 HC RX 637 (ALT 250 FOR IP): Performed by: EMERGENCY MEDICINE

## 2023-01-01 PROCEDURE — 51702 INSERT TEMP BLADDER CATH: CPT

## 2023-01-01 PROCEDURE — 93005 ELECTROCARDIOGRAM TRACING: CPT | Performed by: EMERGENCY MEDICINE

## 2023-01-01 PROCEDURE — 87205 SMEAR GRAM STAIN: CPT

## 2023-01-01 PROCEDURE — 2580000003 HC RX 258: Performed by: EMERGENCY MEDICINE

## 2023-01-01 PROCEDURE — 36592 COLLECT BLOOD FROM PICC: CPT

## 2023-01-01 PROCEDURE — 6370000000 HC RX 637 (ALT 250 FOR IP): Performed by: INTERNAL MEDICINE

## 2023-01-01 PROCEDURE — 82140 ASSAY OF AMMONIA: CPT

## 2023-01-01 PROCEDURE — 87804 INFLUENZA ASSAY W/OPTIC: CPT

## 2023-01-01 PROCEDURE — 85730 THROMBOPLASTIN TIME PARTIAL: CPT

## 2023-01-01 PROCEDURE — 87635 SARS-COV-2 COVID-19 AMP PRB: CPT

## 2023-01-01 PROCEDURE — 99285 EMERGENCY DEPT VISIT HI MDM: CPT

## 2023-01-01 PROCEDURE — 84295 ASSAY OF SERUM SODIUM: CPT

## 2023-01-01 PROCEDURE — 83735 ASSAY OF MAGNESIUM: CPT

## 2023-01-01 PROCEDURE — 85014 HEMATOCRIT: CPT

## 2023-01-01 PROCEDURE — 82077 ASSAY SPEC XCP UR&BREATH IA: CPT

## 2023-01-01 PROCEDURE — 36600 WITHDRAWAL OF ARTERIAL BLOOD: CPT

## 2023-01-01 PROCEDURE — 83690 ASSAY OF LIPASE: CPT

## 2023-01-01 PROCEDURE — 80179 DRUG ASSAY SALICYLATE: CPT

## 2023-01-01 PROCEDURE — 94003 VENT MGMT INPAT SUBQ DAY: CPT

## 2023-01-01 PROCEDURE — 2000000000 HC ICU R&B

## 2023-01-01 PROCEDURE — 82728 ASSAY OF FERRITIN: CPT

## 2023-01-01 PROCEDURE — 85652 RBC SED RATE AUTOMATED: CPT

## 2023-01-01 PROCEDURE — 82306 VITAMIN D 25 HYDROXY: CPT

## 2023-01-01 PROCEDURE — 82947 ASSAY GLUCOSE BLOOD QUANT: CPT

## 2023-01-01 PROCEDURE — 95819 EEG AWAKE AND ASLEEP: CPT

## 2023-01-01 PROCEDURE — 85384 FIBRINOGEN ACTIVITY: CPT

## 2023-01-01 PROCEDURE — 49083 ABD PARACENTESIS W/IMAGING: CPT

## 2023-01-01 PROCEDURE — 2500000003 HC RX 250 WO HCPCS: Performed by: FAMILY MEDICINE

## 2023-01-01 RX ORDER — LACTULOSE 10 G/15ML
20 SOLUTION ORAL
Status: DISCONTINUED | OUTPATIENT
Start: 2023-01-01 | End: 2023-01-01

## 2023-01-01 RX ORDER — SODIUM CHLORIDE 9 MG/ML
INJECTION, SOLUTION INTRAVENOUS PRN
Status: DISCONTINUED | OUTPATIENT
Start: 2023-01-01 | End: 2023-01-01 | Stop reason: HOSPADM

## 2023-01-01 RX ORDER — PROMETHAZINE HYDROCHLORIDE 25 MG/1
12.5 TABLET ORAL EVERY 6 HOURS PRN
Status: DISCONTINUED | OUTPATIENT
Start: 2023-01-01 | End: 2023-01-01 | Stop reason: HOSPADM

## 2023-01-01 RX ORDER — ACETAMINOPHEN 650 MG/1
650 SUPPOSITORY RECTAL EVERY 6 HOURS PRN
Status: DISCONTINUED | OUTPATIENT
Start: 2023-01-01 | End: 2023-01-01 | Stop reason: HOSPADM

## 2023-01-01 RX ORDER — NALOXONE HYDROCHLORIDE 0.4 MG/ML
INJECTION, SOLUTION INTRAMUSCULAR; INTRAVENOUS; SUBCUTANEOUS
Status: COMPLETED | OUTPATIENT
Start: 2023-01-01 | End: 2023-01-01

## 2023-01-01 RX ORDER — SODIUM CHLORIDE 0.9 % (FLUSH) 0.9 %
10 SYRINGE (ML) INJECTION EVERY 12 HOURS SCHEDULED
Status: DISCONTINUED | OUTPATIENT
Start: 2023-01-01 | End: 2023-01-01 | Stop reason: HOSPADM

## 2023-01-01 RX ORDER — METHYLPREDNISOLONE SODIUM SUCCINATE 125 MG/2ML
125 INJECTION, POWDER, LYOPHILIZED, FOR SOLUTION INTRAMUSCULAR; INTRAVENOUS ONCE
Status: COMPLETED | OUTPATIENT
Start: 2023-01-01 | End: 2023-01-01

## 2023-01-01 RX ORDER — DEXAMETHASONE SODIUM PHOSPHATE 10 MG/ML
6 INJECTION, SOLUTION INTRAMUSCULAR; INTRAVENOUS EVERY 24 HOURS
Status: DISCONTINUED | OUTPATIENT
Start: 2023-01-01 | End: 2023-01-01

## 2023-01-01 RX ORDER — MIDAZOLAM HYDROCHLORIDE 1 MG/ML
1-10 INJECTION, SOLUTION INTRAVENOUS CONTINUOUS
Status: DISCONTINUED | OUTPATIENT
Start: 2023-01-01 | End: 2023-01-01 | Stop reason: HOSPADM

## 2023-01-01 RX ORDER — MAGNESIUM SULFATE IN WATER 40 MG/ML
2000 INJECTION, SOLUTION INTRAVENOUS PRN
Status: DISCONTINUED | OUTPATIENT
Start: 2023-01-01 | End: 2023-01-01 | Stop reason: HOSPADM

## 2023-01-01 RX ORDER — ONDANSETRON 2 MG/ML
4 INJECTION INTRAMUSCULAR; INTRAVENOUS EVERY 6 HOURS PRN
Status: DISCONTINUED | OUTPATIENT
Start: 2023-01-01 | End: 2023-01-01 | Stop reason: HOSPADM

## 2023-01-01 RX ORDER — FUROSEMIDE 10 MG/ML
20 INJECTION INTRAMUSCULAR; INTRAVENOUS DAILY
Status: DISCONTINUED | OUTPATIENT
Start: 2023-01-01 | End: 2023-01-01

## 2023-01-01 RX ORDER — HEPARIN SODIUM 5000 [USP'U]/ML
5000 INJECTION, SOLUTION INTRAVENOUS; SUBCUTANEOUS EVERY 8 HOURS SCHEDULED
Status: DISCONTINUED | OUTPATIENT
Start: 2023-01-01 | End: 2023-01-01

## 2023-01-01 RX ORDER — IPRATROPIUM BROMIDE AND ALBUTEROL SULFATE 2.5; .5 MG/3ML; MG/3ML
1 SOLUTION RESPIRATORY (INHALATION) ONCE
Status: COMPLETED | OUTPATIENT
Start: 2023-01-01 | End: 2023-01-01

## 2023-01-01 RX ORDER — FUROSEMIDE 10 MG/ML
80 INJECTION INTRAMUSCULAR; INTRAVENOUS ONCE
Status: COMPLETED | OUTPATIENT
Start: 2023-01-01 | End: 2023-01-01

## 2023-01-01 RX ORDER — LEVOFLOXACIN 5 MG/ML
750 INJECTION, SOLUTION INTRAVENOUS EVERY 24 HOURS
Status: DISCONTINUED | OUTPATIENT
Start: 2023-01-01 | End: 2023-01-01

## 2023-01-01 RX ORDER — LORAZEPAM 2 MG/ML
1 CONCENTRATE ORAL
Status: DISCONTINUED | OUTPATIENT
Start: 2023-01-01 | End: 2023-01-01 | Stop reason: HOSPADM

## 2023-01-01 RX ORDER — ATROPINE SULFATE 0.1 MG/ML
INJECTION INTRAVENOUS
Status: COMPLETED | OUTPATIENT
Start: 2023-01-01 | End: 2023-01-01

## 2023-01-01 RX ORDER — METRONIDAZOLE 500 MG/100ML
500 INJECTION, SOLUTION INTRAVENOUS EVERY 8 HOURS
Status: DISCONTINUED | OUTPATIENT
Start: 2023-01-01 | End: 2023-01-01

## 2023-01-01 RX ORDER — ACETAMINOPHEN 325 MG/1
650 TABLET ORAL EVERY 6 HOURS PRN
Status: DISCONTINUED | OUTPATIENT
Start: 2023-01-01 | End: 2023-01-01 | Stop reason: HOSPADM

## 2023-01-01 RX ORDER — POTASSIUM CHLORIDE 7.45 MG/ML
10 INJECTION INTRAVENOUS PRN
Status: DISCONTINUED | OUTPATIENT
Start: 2023-01-01 | End: 2023-01-01 | Stop reason: HOSPADM

## 2023-01-01 RX ORDER — LACTULOSE 10 G/15ML
20 SOLUTION ORAL ONCE
Status: COMPLETED | OUTPATIENT
Start: 2023-01-01 | End: 2023-01-01

## 2023-01-01 RX ORDER — NOREPINEPHRINE BIT/0.9 % NACL 16MG/250ML
1-100 INFUSION BOTTLE (ML) INTRAVENOUS CONTINUOUS
Status: DISCONTINUED | OUTPATIENT
Start: 2023-01-01 | End: 2023-01-01 | Stop reason: HOSPADM

## 2023-01-01 RX ORDER — GUAIFENESIN/DEXTROMETHORPHAN 100-10MG/5
5 SYRUP ORAL EVERY 4 HOURS PRN
Status: DISCONTINUED | OUTPATIENT
Start: 2023-01-01 | End: 2023-01-01 | Stop reason: HOSPADM

## 2023-01-01 RX ORDER — SODIUM CHLORIDE 0.9 % (FLUSH) 0.9 %
10 SYRINGE (ML) INJECTION PRN
Status: DISCONTINUED | OUTPATIENT
Start: 2023-01-01 | End: 2023-01-01 | Stop reason: HOSPADM

## 2023-01-01 RX ORDER — SCOLOPAMINE TRANSDERMAL SYSTEM 1 MG/1
1 PATCH, EXTENDED RELEASE TRANSDERMAL
Status: DISCONTINUED | OUTPATIENT
Start: 2023-01-01 | End: 2023-01-01 | Stop reason: HOSPADM

## 2023-01-01 RX ORDER — EPINEPHRINE 0.1 MG/ML
SYRINGE (ML) INJECTION
Status: COMPLETED | OUTPATIENT
Start: 2023-01-01 | End: 2023-01-01

## 2023-01-01 RX ORDER — LIDOCAINE HYDROCHLORIDE 10 MG/ML
10 INJECTION, SOLUTION EPIDURAL; INFILTRATION; INTRACAUDAL; PERINEURAL ONCE
Status: COMPLETED | OUTPATIENT
Start: 2023-01-01 | End: 2023-01-01

## 2023-01-01 RX ADMIN — POTASSIUM CHLORIDE 10 MEQ: 7.46 INJECTION, SOLUTION INTRAVENOUS at 08:51

## 2023-01-01 RX ADMIN — SODIUM CHLORIDE: 9 INJECTION, SOLUTION INTRAVENOUS at 05:23

## 2023-01-01 RX ADMIN — Medication 2 MG/HR: at 08:45

## 2023-01-01 RX ADMIN — METRONIDAZOLE 500 MG: 500 INJECTION, SOLUTION INTRAVENOUS at 05:58

## 2023-01-01 RX ADMIN — SODIUM CHLORIDE: 9 INJECTION, SOLUTION INTRAVENOUS at 05:55

## 2023-01-01 RX ADMIN — HEPARIN SODIUM 5000 UNITS: 5000 INJECTION INTRAVENOUS; SUBCUTANEOUS at 05:39

## 2023-01-01 RX ADMIN — EPINEPHRINE 1 MG: 0.1 INJECTION, SOLUTION ENDOTRACHEAL; INTRACARDIAC; INTRAVENOUS at 03:37

## 2023-01-01 RX ADMIN — METRONIDAZOLE 500 MG: 500 INJECTION, SOLUTION INTRAVENOUS at 13:17

## 2023-01-01 RX ADMIN — SODIUM CHLORIDE, PRESERVATIVE FREE 10 ML: 5 INJECTION INTRAVENOUS at 08:50

## 2023-01-01 RX ADMIN — Medication 4 MCG/MIN: at 04:30

## 2023-01-01 RX ADMIN — FUROSEMIDE 80 MG: 10 INJECTION, SOLUTION INTRAMUSCULAR; INTRAVENOUS at 19:50

## 2023-01-01 RX ADMIN — DOXYCYCLINE 100 MG: 100 INJECTION, POWDER, LYOPHILIZED, FOR SOLUTION INTRAVENOUS at 04:38

## 2023-01-01 RX ADMIN — METHYLPREDNISOLONE SODIUM SUCCINATE 125 MG: 125 INJECTION, POWDER, FOR SOLUTION INTRAMUSCULAR; INTRAVENOUS at 17:43

## 2023-01-01 RX ADMIN — LIDOCAINE HYDROCHLORIDE 10 ML: 10 INJECTION, SOLUTION EPIDURAL; INFILTRATION; INTRACAUDAL; PERINEURAL at 09:22

## 2023-01-01 RX ADMIN — ACETAMINOPHEN 650 MG: 325 TABLET ORAL at 00:15

## 2023-01-01 RX ADMIN — LACTULOSE: 10 SOLUTION ORAL; RECTAL at 13:32

## 2023-01-01 RX ADMIN — SODIUM CHLORIDE, PRESERVATIVE FREE 10 ML: 5 INJECTION INTRAVENOUS at 20:17

## 2023-01-01 RX ADMIN — METRONIDAZOLE 500 MG: 500 INJECTION, SOLUTION INTRAVENOUS at 20:30

## 2023-01-01 RX ADMIN — HEPARIN SODIUM 5000 UNITS: 5000 INJECTION INTRAVENOUS; SUBCUTANEOUS at 09:16

## 2023-01-01 RX ADMIN — FUROSEMIDE 20 MG: 10 INJECTION, SOLUTION INTRAMUSCULAR; INTRAVENOUS at 08:33

## 2023-01-01 RX ADMIN — LACTULOSE: 10 SOLUTION ORAL; RECTAL at 22:30

## 2023-01-01 RX ADMIN — HEPARIN SODIUM 5000 UNITS: 5000 INJECTION INTRAVENOUS; SUBCUTANEOUS at 14:12

## 2023-01-01 RX ADMIN — SODIUM CHLORIDE, PRESERVATIVE FREE 10 ML: 5 INJECTION INTRAVENOUS at 08:33

## 2023-01-01 RX ADMIN — MORPHINE SULFATE 1 MG/HR: 10 INJECTION INTRAVENOUS at 16:19

## 2023-01-01 RX ADMIN — IPRATROPIUM BROMIDE AND ALBUTEROL SULFATE 1 AMPULE: 2.5; .5 SOLUTION RESPIRATORY (INHALATION) at 17:59

## 2023-01-01 RX ADMIN — LACTULOSE 20 G: 20 SOLUTION ORAL at 00:12

## 2023-01-01 RX ADMIN — SODIUM CHLORIDE, PRESERVATIVE FREE 10 ML: 5 INJECTION INTRAVENOUS at 00:12

## 2023-01-01 RX ADMIN — LACTULOSE: 10 SOLUTION ORAL; RECTAL at 06:04

## 2023-01-01 RX ADMIN — LACTULOSE 20 G: 20 SOLUTION ORAL at 19:47

## 2023-01-01 RX ADMIN — LEVOFLOXACIN 750 MG: 5 INJECTION, SOLUTION INTRAVENOUS at 11:39

## 2023-01-01 RX ADMIN — DOXYCYCLINE 100 MG: 100 INJECTION, POWDER, LYOPHILIZED, FOR SOLUTION INTRAVENOUS at 18:53

## 2023-01-01 RX ADMIN — METRONIDAZOLE 500 MG: 500 INJECTION, SOLUTION INTRAVENOUS at 04:42

## 2023-01-01 RX ADMIN — FUROSEMIDE 20 MG: 10 INJECTION, SOLUTION INTRAMUSCULAR; INTRAVENOUS at 13:03

## 2023-01-01 RX ADMIN — NALOXONE HYDROCHLORIDE 0.4 MG: 0.4 INJECTION, SOLUTION INTRAMUSCULAR; INTRAVENOUS; SUBCUTANEOUS at 03:37

## 2023-01-01 RX ADMIN — DEXAMETHASONE SODIUM PHOSPHATE 6 MG: 10 INJECTION, SOLUTION INTRAMUSCULAR; INTRAVENOUS at 08:55

## 2023-01-01 RX ADMIN — ATROPINE SULFATE 1 MG: 0.1 INJECTION, SOLUTION ENDOTRACHEAL; INTRAMUSCULAR; INTRAVENOUS; SUBCUTANEOUS at 03:37

## 2023-01-01 RX ADMIN — CEFTRIAXONE SODIUM 1000 MG: 1 INJECTION, POWDER, FOR SOLUTION INTRAMUSCULAR; INTRAVENOUS at 20:16

## 2023-01-01 RX ADMIN — HEPARIN SODIUM 5000 UNITS: 5000 INJECTION INTRAVENOUS; SUBCUTANEOUS at 23:16

## 2023-01-01 RX ADMIN — DOXYCYCLINE 100 MG: 100 INJECTION, POWDER, LYOPHILIZED, FOR SOLUTION INTRAVENOUS at 05:29

## 2023-01-01 RX ADMIN — CEFTRIAXONE SODIUM 1000 MG: 1 INJECTION, POWDER, FOR SOLUTION INTRAMUSCULAR; INTRAVENOUS at 19:47

## 2023-01-01 RX ADMIN — DEXAMETHASONE SODIUM PHOSPHATE 6 MG: 10 INJECTION, SOLUTION INTRAMUSCULAR; INTRAVENOUS at 08:33

## 2023-01-01 ASSESSMENT — PAIN SCALES - GENERAL
PAINLEVEL_OUTOF10: 0
PAINLEVEL_OUTOF10: 5
PAINLEVEL_OUTOF10: 0

## 2023-01-01 ASSESSMENT — PULMONARY FUNCTION TESTS
PIF_VALUE: 22
PIF_VALUE: 23
PIF_VALUE: 23
PIF_VALUE: 28
PIF_VALUE: 24
PIF_VALUE: 23
PIF_VALUE: 36
PIF_VALUE: 28
PIF_VALUE: 28
PIF_VALUE: 23
PIF_VALUE: 25

## 2023-01-01 ASSESSMENT — LIFESTYLE VARIABLES
HOW OFTEN DO YOU HAVE A DRINK CONTAINING ALCOHOL: MONTHLY OR LESS
HOW MANY STANDARD DRINKS CONTAINING ALCOHOL DO YOU HAVE ON A TYPICAL DAY: 1 OR 2

## 2023-01-01 ASSESSMENT — PAIN SCALES - WONG BAKER

## 2023-01-01 ASSESSMENT — PAIN DESCRIPTION - LOCATION: LOCATION: HEAD

## 2023-01-01 ASSESSMENT — PAIN - FUNCTIONAL ASSESSMENT
PAIN_FUNCTIONAL_ASSESSMENT: PREVENTS OR INTERFERES SOME ACTIVE ACTIVITIES AND ADLS
PAIN_FUNCTIONAL_ASSESSMENT: NONE - DENIES PAIN

## 2023-01-01 ASSESSMENT — PAIN DESCRIPTION - PAIN TYPE: TYPE: ACUTE PAIN

## 2023-01-01 ASSESSMENT — PAIN DESCRIPTION - ORIENTATION: ORIENTATION: MID

## 2023-01-01 ASSESSMENT — PAIN DESCRIPTION - DESCRIPTORS: DESCRIPTORS: ACHING;DISCOMFORT

## 2023-01-10 ENCOUNTER — OFFICE VISIT (OUTPATIENT)
Dept: INTERNAL MEDICINE CLINIC | Age: 48
End: 2023-01-10
Payer: MEDICAID

## 2023-01-10 VITALS
WEIGHT: 293 LBS | OXYGEN SATURATION: 97 % | SYSTOLIC BLOOD PRESSURE: 142 MMHG | BODY MASS INDEX: 47.09 KG/M2 | HEIGHT: 66 IN | HEART RATE: 102 BPM | DIASTOLIC BLOOD PRESSURE: 88 MMHG

## 2023-01-10 DIAGNOSIS — K74.69 OTHER CIRRHOSIS OF LIVER (HCC): ICD-10-CM

## 2023-01-10 DIAGNOSIS — D69.6 THROMBOCYTOPENIA (HCC): ICD-10-CM

## 2023-01-10 DIAGNOSIS — I27.20 PULMONARY HYPERTENSION (HCC): ICD-10-CM

## 2023-01-10 DIAGNOSIS — T40.601A OPIATE OVERDOSE, ACCIDENTAL OR UNINTENTIONAL, INITIAL ENCOUNTER (HCC): ICD-10-CM

## 2023-01-10 DIAGNOSIS — J41.0 SIMPLE CHRONIC BRONCHITIS (HCC): ICD-10-CM

## 2023-01-10 DIAGNOSIS — F32.0 CURRENT MILD EPISODE OF MAJOR DEPRESSIVE DISORDER WITHOUT PRIOR EPISODE (HCC): Primary | ICD-10-CM

## 2023-01-10 PROCEDURE — 99214 OFFICE O/P EST MOD 30 MIN: CPT | Performed by: INTERNAL MEDICINE

## 2023-01-10 RX ORDER — ALBUTEROL SULFATE 90 UG/1
2 AEROSOL, METERED RESPIRATORY (INHALATION) 4 TIMES DAILY PRN
Qty: 54 G | Refills: 1 | Status: SHIPPED | OUTPATIENT
Start: 2023-01-10

## 2023-01-10 RX ORDER — SPIRONOLACTONE 50 MG/1
50 TABLET, FILM COATED ORAL DAILY
Qty: 30 TABLET | Refills: 1 | Status: SHIPPED | OUTPATIENT
Start: 2023-01-10

## 2023-01-10 RX ORDER — FUROSEMIDE 40 MG/1
40 TABLET ORAL DAILY
Qty: 30 TABLET | Refills: 0 | Status: SHIPPED | OUTPATIENT
Start: 2023-01-10

## 2023-01-10 ASSESSMENT — PATIENT HEALTH QUESTIONNAIRE - PHQ9
SUM OF ALL RESPONSES TO PHQ QUESTIONS 1-9: 14
2. FEELING DOWN, DEPRESSED OR HOPELESS: 2
9. THOUGHTS THAT YOU WOULD BE BETTER OFF DEAD, OR OF HURTING YOURSELF: 0
7. TROUBLE CONCENTRATING ON THINGS, SUCH AS READING THE NEWSPAPER OR WATCHING TELEVISION: 1
SUM OF ALL RESPONSES TO PHQ QUESTIONS 1-9: 14
SUM OF ALL RESPONSES TO PHQ QUESTIONS 1-9: 14
10. IF YOU CHECKED OFF ANY PROBLEMS, HOW DIFFICULT HAVE THESE PROBLEMS MADE IT FOR YOU TO DO YOUR WORK, TAKE CARE OF THINGS AT HOME, OR GET ALONG WITH OTHER PEOPLE: 0
8. MOVING OR SPEAKING SO SLOWLY THAT OTHER PEOPLE COULD HAVE NOTICED. OR THE OPPOSITE, BEING SO FIGETY OR RESTLESS THAT YOU HAVE BEEN MOVING AROUND A LOT MORE THAN USUAL: 2
1. LITTLE INTEREST OR PLEASURE IN DOING THINGS: 2
4. FEELING TIRED OR HAVING LITTLE ENERGY: 3
6. FEELING BAD ABOUT YOURSELF - OR THAT YOU ARE A FAILURE OR HAVE LET YOURSELF OR YOUR FAMILY DOWN: 1
SUM OF ALL RESPONSES TO PHQ9 QUESTIONS 1 & 2: 4
SUM OF ALL RESPONSES TO PHQ QUESTIONS 1-9: 14
5. POOR APPETITE OR OVEREATING: 3

## 2023-01-10 ASSESSMENT — ENCOUNTER SYMPTOMS
SINUS PAIN: 0
BLOOD IN STOOL: 0
ABDOMINAL PAIN: 1
CONSTIPATION: 0
CHEST TIGHTNESS: 0
VISUAL CHANGE: 0
SWOLLEN GLANDS: 0
SORE THROAT: 0
COUGH: 0
WHEEZING: 0
SHORTNESS OF BREATH: 0
COLOR CHANGE: 0

## 2023-01-10 ASSESSMENT — COPD QUESTIONNAIRES: COPD: 1

## 2023-01-10 NOTE — PROGRESS NOTES
Aviva Kline (:  1975) is a 49 y.o. female,Established patient, here for evaluation of the following chief complaint(s):  Leg Swelling (Legs , stomach swelling )         ASSESSMENT/PLAN:  1. Current mild episode of major depressive disorder without prior episode ([facility]  2. Other cirrhosis of liver ([facility]  -     Comprehensive Metabolic Panel; Future  -     CBC with Auto Differential; Future  -     AFP Tumor Marker; Future  -     US ABDOMEN LIMITED; Future  -     spironolactone (ALDACTONE) 50 MG tablet; Take 1 tablet by mouth daily, Disp-30 tablet, R-1Normal  -     furosemide (LASIX) 40 MG tablet; Take 1 tablet by mouth daily, Disp-30 tablet, R-0Normal  3. Body mass index (BMI) 50.0-59.9, adult ([facility].)  4. Simple chronic bronchitis (HCC)  -     albuterol sulfate HFA (VENTOLIN HFA) 108 (90 Base) MCG/ACT inhaler; Inhale 2 puffs into the lungs 4 times daily as needed for Wheezing, Disp-54 g, R-1Normal  5. Pulmonary hypertension (HCC)  6. Opiate overdose, accidental or unintentional, initial encounter ([facility]  7.  Thrombocytopenia ([facility]  The patient had a recent significant increase in the abdominal girth as well as lower extremity edema she has been incarcerated for the last 60 days and just started back on her medication at this point I am very concerned about the potential for hepatocellular carcinoma given her Whitlock's history of cirrhosis as well as hepatitis C and be at this stage we will get a have patient started back on the Aldactone and continue with the Lasix we will get a get her baseline studies including her chemistry kidney function liver function test and alpha-fetoprotein patient understands the importance of taking the medication doing the testing including the ultrasound and then following up with me within the next 2 weeks    Inhalers on as-needed basis    If the patient discomfort continues she is advised to go to the emergency room for more urgent care at this point we will get a have the patient follow closely and hopefully we can resolve and control this in the next couple weeks  Return in about 1 week (around 1/17/2023).          Subjective   SUBJECTIVE/OBJECTIVE:    Lab Review   Lab Results   Component Value Date/Time     10/27/2022 10:30 PM     09/01/2022 03:53 PM     08/04/2022 01:25 PM    K 3.6 10/27/2022 10:30 PM    K 4.4 09/01/2022 03:53 PM    K 4.0 08/04/2022 01:25 PM    K 4.1 12/17/2020 06:50 PM    K 3.6 04/09/2019 05:54 AM    CO2 23 10/27/2022 10:30 PM    CO2 28 09/01/2022 03:53 PM    CO2 27 08/04/2022 01:25 PM    BUN 10 10/27/2022 10:30 PM    BUN 16 09/01/2022 03:53 PM    BUN 12 08/04/2022 01:25 PM    CREATININE 0.8 10/27/2022 10:30 PM    CREATININE 0.8 09/01/2022 03:53 PM    CREATININE 0.7 08/04/2022 01:25 PM    GLUCOSE 123 10/27/2022 10:30 PM    GLUCOSE 84 09/01/2022 03:53 PM    GLUCOSE 126 08/04/2022 01:25 PM    CALCIUM 8.7 10/27/2022 10:30 PM    CALCIUM 8.9 09/01/2022 03:53 PM    CALCIUM 8.5 08/04/2022 01:25 PM     Lab Results   Component Value Date/Time    WBC 5.9 05/17/2022 12:06 PM    WBC 9.1 12/17/2020 06:50 PM    WBC 4.3 04/09/2019 05:54 AM    HGB 13.5 05/17/2022 12:06 PM    HGB 12.9 12/17/2020 06:50 PM    HGB 12.3 04/09/2019 05:54 AM    HCT 39.5 05/17/2022 12:06 PM    HCT 37.2 12/17/2020 06:50 PM    HCT 35.8 04/09/2019 05:54 AM    MCV 98.0 05/17/2022 12:06 PM    MCV 98.5 12/17/2020 06:50 PM    MCV 99.2 04/09/2019 05:54 AM     08/01/2022 12:25 PM    PLT 89 05/17/2022 12:06 PM     12/17/2020 06:50 PM     Lab Results   Component Value Date/Time    CHOL 130 05/17/2022 12:06 PM    TRIG 95 05/17/2022 12:06 PM    HDL 46 05/17/2022 12:06 PM       Vitals 1/10/2023 10/27/2022 88/95/9431   SYSTOLIC 308 020 924   DIASTOLIC 88 79 77   Site - - -   Position - - -   Pulse 102 96 96   Temp - - -   Resp - 17 15   SpO2 97 99 98   Weight 363 lb 12.8 oz - -   Height 5' 6\" - -   Body mass index 58.71 kg/m2 - -   Pain Level - - -   Some recent data might be hidden Abdominal Pain  Pertinent negatives include no arthralgias, constipation, dysuria, frequency, headaches or myalgias. Bloated  This is a new problem. The current episode started 1 to 4 weeks ago. Associated symptoms include abdominal pain. Pertinent negatives include no arthralgias, chest pain, congestion, coughing, fatigue, headaches, myalgias, rash, sore throat, swollen glands, urinary symptoms, visual change or weakness. COPD  There is no cough, shortness of breath or wheezing. Pertinent negatives include no appetite change, chest pain, ear pain, headaches, myalgias or sore throat. Review of Systems   Constitutional:  Negative for activity change, appetite change, fatigue and unexpected weight change. HENT:  Negative for congestion, ear pain, sinus pain and sore throat. Respiratory:  Negative for cough, chest tightness, shortness of breath and wheezing. Cardiovascular:  Negative for chest pain and palpitations. Gastrointestinal:  Positive for abdominal pain. Negative for blood in stool and constipation. Endocrine: Negative for cold intolerance, heat intolerance and polyuria. Genitourinary:  Negative for dysuria, frequency and urgency. Musculoskeletal:  Negative for arthralgias and myalgias. Skin:  Negative for color change and rash. Neurological:  Negative for weakness and headaches. Hematological:  Negative for adenopathy. Does not bruise/bleed easily. Psychiatric/Behavioral:  Negative for agitation, dysphoric mood and sleep disturbance. Objective   Physical Exam  Vitals and nursing note reviewed. Constitutional:       General: She is not in acute distress. Appearance: Normal appearance. HENT:      Head: Normocephalic and atraumatic. Right Ear: Tympanic membrane normal.      Left Ear: Tympanic membrane normal.      Nose: Nose normal.   Eyes:      Extraocular Movements: Extraocular movements intact.       Conjunctiva/sclera: Conjunctivae normal. Pupils: Pupils are equal, round, and reactive to light. Neck:      Vascular: No carotid bruit. Cardiovascular:      Rate and Rhythm: Normal rate and regular rhythm. Pulses: Normal pulses. Heart sounds: No murmur heard. Pulmonary:      Effort: Pulmonary effort is normal. No respiratory distress. Breath sounds: Normal breath sounds. Abdominal:      General: Abdomen is flat. Bowel sounds are normal. There is distension. Palpations: Abdomen is soft. There is no mass. Tenderness: There is no abdominal tenderness. There is no rebound. Hernia: No hernia is present. Musculoskeletal:         General: No swelling, tenderness or deformity. Cervical back: Normal range of motion and neck supple. No rigidity or tenderness. Right lower leg: No edema. Left lower leg: No edema. Lymphadenopathy:      Cervical: No cervical adenopathy. Skin:     Coloration: Skin is not jaundiced. Findings: No bruising, erythema or lesion. Neurological:      General: No focal deficit present. Mental Status: She is alert and oriented to person, place, and time. Cranial Nerves: No cranial nerve deficit. Motor: No weakness. Gait: Gait normal.          This dictation was generated by voice recognition computer software. Although all attempts are made to edit the dictation for accuracy, there may be errors in the transcription that are not intended. An electronic signature was used to authenticate this note.     --Devi Osorio MD

## 2023-02-08 ENCOUNTER — APPOINTMENT (OUTPATIENT)
Dept: GENERAL RADIOLOGY | Age: 48
DRG: 433 | End: 2023-02-08
Payer: COMMERCIAL

## 2023-02-08 ENCOUNTER — APPOINTMENT (OUTPATIENT)
Dept: CT IMAGING | Age: 48
DRG: 433 | End: 2023-02-08
Payer: COMMERCIAL

## 2023-02-08 ENCOUNTER — HOSPITAL ENCOUNTER (INPATIENT)
Age: 48
LOS: 2 days | Discharge: HOME | DRG: 433 | End: 2023-02-10
Attending: EMERGENCY MEDICINE | Admitting: INTERNAL MEDICINE
Payer: COMMERCIAL

## 2023-02-08 DIAGNOSIS — R79.89 ELEVATED LACTIC ACID LEVEL: ICD-10-CM

## 2023-02-08 DIAGNOSIS — R10.84 GENERALIZED ABDOMINAL PAIN: ICD-10-CM

## 2023-02-08 DIAGNOSIS — R16.1 SPLENOMEGALY: ICD-10-CM

## 2023-02-08 DIAGNOSIS — N30.01 ACUTE CYSTITIS WITH HEMATURIA: Primary | ICD-10-CM

## 2023-02-08 DIAGNOSIS — R23.8 BREAKDOWN OF SKIN TISSUE: ICD-10-CM

## 2023-02-08 DIAGNOSIS — K74.60 CIRRHOSIS OF LIVER WITH ASCITES, UNSPECIFIED HEPATIC CIRRHOSIS TYPE (HCC): ICD-10-CM

## 2023-02-08 DIAGNOSIS — K74.69 OTHER CIRRHOSIS OF LIVER (HCC): ICD-10-CM

## 2023-02-08 DIAGNOSIS — R60.1 ANASARCA: ICD-10-CM

## 2023-02-08 DIAGNOSIS — E88.09 HYPOALBUMINEMIA: ICD-10-CM

## 2023-02-08 DIAGNOSIS — R18.8 CIRRHOSIS OF LIVER WITH ASCITES, UNSPECIFIED HEPATIC CIRRHOSIS TYPE (HCC): ICD-10-CM

## 2023-02-08 LAB
ALBUMIN SERPL-MCNC: 2.4 G/DL (ref 3.4–5)
ALP BLD-CCNC: 232 U/L (ref 40–129)
ALT SERPL-CCNC: 17 U/L (ref 10–40)
AMMONIA: 38 UMOL/L (ref 11–51)
ANION GAP SERPL CALCULATED.3IONS-SCNC: 9 MMOL/L (ref 3–16)
AST SERPL-CCNC: 58 U/L (ref 15–37)
BACTERIA: ABNORMAL /HPF
BASOPHILS ABSOLUTE: 0.1 K/UL (ref 0–0.2)
BASOPHILS RELATIVE PERCENT: 0.9 %
BILIRUB SERPL-MCNC: 1.3 MG/DL (ref 0–1)
BILIRUBIN DIRECT: 0.5 MG/DL (ref 0–0.3)
BILIRUBIN URINE: NEGATIVE
BILIRUBIN, INDIRECT: 0.8 MG/DL (ref 0–1)
BLOOD, URINE: ABNORMAL
BUN BLDV-MCNC: 5 MG/DL (ref 7–20)
C-REACTIVE PROTEIN: 22.1 MG/L (ref 0–5.1)
CALCIUM SERPL-MCNC: 8.6 MG/DL (ref 8.3–10.6)
CHLORIDE BLD-SCNC: 100 MMOL/L (ref 99–110)
CLARITY: ABNORMAL
CO2: 26 MMOL/L (ref 21–32)
COLOR: ABNORMAL
CREAT SERPL-MCNC: 0.7 MG/DL (ref 0.6–1.1)
EKG ATRIAL RATE: 93 BPM
EKG DIAGNOSIS: NORMAL
EKG P AXIS: 38 DEGREES
EKG P-R INTERVAL: 148 MS
EKG Q-T INTERVAL: 440 MS
EKG QRS DURATION: 74 MS
EKG QTC CALCULATION (BAZETT): 547 MS
EKG R AXIS: 28 DEGREES
EKG T AXIS: 27 DEGREES
EKG VENTRICULAR RATE: 93 BPM
EOSINOPHILS ABSOLUTE: 0.3 K/UL (ref 0–0.6)
EOSINOPHILS RELATIVE PERCENT: 4.9 %
EPITHELIAL CELLS, UA: 9 /HPF (ref 0–5)
GFR SERPL CREATININE-BSD FRML MDRD: >60 ML/MIN/{1.73_M2}
GLUCOSE BLD-MCNC: 131 MG/DL (ref 70–99)
GLUCOSE URINE: NEGATIVE MG/DL
HCG QUALITATIVE: NEGATIVE
HCT VFR BLD CALC: 40.8 % (ref 36–48)
HEMOGLOBIN: 13.2 G/DL (ref 12–16)
HYALINE CASTS: 1 /LPF (ref 0–8)
INR BLD: 1.39 (ref 0.87–1.14)
KETONES, URINE: NEGATIVE MG/DL
LACTIC ACID: 2.9 MMOL/L (ref 0.4–2)
LEUKOCYTE ESTERASE, URINE: ABNORMAL
LIPASE: 17 U/L (ref 13–60)
LYMPHOCYTES ABSOLUTE: 2.7 K/UL (ref 1–5.1)
LYMPHOCYTES RELATIVE PERCENT: 38.7 %
MAGNESIUM: 1.8 MG/DL (ref 1.8–2.4)
MCH RBC QN AUTO: 32.4 PG (ref 26–34)
MCHC RBC AUTO-ENTMCNC: 32.3 G/DL (ref 31–36)
MCV RBC AUTO: 100.3 FL (ref 80–100)
MICROSCOPIC EXAMINATION: YES
MONOCYTES ABSOLUTE: 0.5 K/UL (ref 0–1.3)
MONOCYTES RELATIVE PERCENT: 7.8 %
NEUTROPHILS ABSOLUTE: 3.3 K/UL (ref 1.7–7.7)
NEUTROPHILS RELATIVE PERCENT: 47.7 %
NITRITE, URINE: NEGATIVE
PDW BLD-RTO: 17.3 % (ref 12.4–15.4)
PH UA: 6.5 (ref 5–8)
PLATELET # BLD: 131 K/UL (ref 135–450)
PMV BLD AUTO: 9.4 FL (ref 5–10.5)
POTASSIUM REFLEX MAGNESIUM: 3.3 MMOL/L (ref 3.5–5.1)
PRO-BNP: 91 PG/ML (ref 0–124)
PROCALCITONIN: 0.08 NG/ML (ref 0–0.15)
PROTEIN UA: ABNORMAL MG/DL
PROTHROMBIN TIME: 17 SEC (ref 11.7–14.5)
RBC # BLD: 4.07 M/UL (ref 4–5.2)
RBC UA: 1 /HPF (ref 0–4)
SEDIMENTATION RATE, ERYTHROCYTE: 57 MM/HR (ref 0–20)
SODIUM BLD-SCNC: 135 MMOL/L (ref 136–145)
SPECIFIC GRAVITY UA: 1.01 (ref 1–1.03)
TOTAL PROTEIN: 7.3 G/DL (ref 6.4–8.2)
TROPONIN: <0.01 NG/ML
URINE REFLEX TO CULTURE: YES
URINE TYPE: ABNORMAL
UROBILINOGEN, URINE: 1 E.U./DL
WBC # BLD: 6.9 K/UL (ref 4–11)
WBC UA: 55 /HPF (ref 0–5)

## 2023-02-08 PROCEDURE — 1200000000 HC SEMI PRIVATE

## 2023-02-08 PROCEDURE — 2580000003 HC RX 258: Performed by: EMERGENCY MEDICINE

## 2023-02-08 PROCEDURE — 6360000002 HC RX W HCPCS: Performed by: PHYSICIAN ASSISTANT

## 2023-02-08 PROCEDURE — 2580000003 HC RX 258: Performed by: INTERNAL MEDICINE

## 2023-02-08 PROCEDURE — 6370000000 HC RX 637 (ALT 250 FOR IP): Performed by: PHYSICIAN ASSISTANT

## 2023-02-08 PROCEDURE — 84703 CHORIONIC GONADOTROPIN ASSAY: CPT

## 2023-02-08 PROCEDURE — 83880 ASSAY OF NATRIURETIC PEPTIDE: CPT

## 2023-02-08 PROCEDURE — 85652 RBC SED RATE AUTOMATED: CPT

## 2023-02-08 PROCEDURE — 93010 ELECTROCARDIOGRAM REPORT: CPT | Performed by: INTERNAL MEDICINE

## 2023-02-08 PROCEDURE — 82746 ASSAY OF FOLIC ACID SERUM: CPT

## 2023-02-08 PROCEDURE — 6360000002 HC RX W HCPCS: Performed by: INTERNAL MEDICINE

## 2023-02-08 PROCEDURE — 6370000000 HC RX 637 (ALT 250 FOR IP): Performed by: EMERGENCY MEDICINE

## 2023-02-08 PROCEDURE — 83540 ASSAY OF IRON: CPT

## 2023-02-08 PROCEDURE — 82140 ASSAY OF AMMONIA: CPT

## 2023-02-08 PROCEDURE — 83735 ASSAY OF MAGNESIUM: CPT

## 2023-02-08 PROCEDURE — 36415 COLL VENOUS BLD VENIPUNCTURE: CPT

## 2023-02-08 PROCEDURE — 71045 X-RAY EXAM CHEST 1 VIEW: CPT

## 2023-02-08 PROCEDURE — 87040 BLOOD CULTURE FOR BACTERIA: CPT

## 2023-02-08 PROCEDURE — 83605 ASSAY OF LACTIC ACID: CPT

## 2023-02-08 PROCEDURE — 80048 BASIC METABOLIC PNL TOTAL CA: CPT

## 2023-02-08 PROCEDURE — 87086 URINE CULTURE/COLONY COUNT: CPT

## 2023-02-08 PROCEDURE — 6370000000 HC RX 637 (ALT 250 FOR IP): Performed by: INTERNAL MEDICINE

## 2023-02-08 PROCEDURE — 86140 C-REACTIVE PROTEIN: CPT

## 2023-02-08 PROCEDURE — 84484 ASSAY OF TROPONIN QUANT: CPT

## 2023-02-08 PROCEDURE — 96365 THER/PROPH/DIAG IV INF INIT: CPT

## 2023-02-08 PROCEDURE — 99285 EMERGENCY DEPT VISIT HI MDM: CPT

## 2023-02-08 PROCEDURE — 73590 X-RAY EXAM OF LOWER LEG: CPT

## 2023-02-08 PROCEDURE — 82607 VITAMIN B-12: CPT

## 2023-02-08 PROCEDURE — 80076 HEPATIC FUNCTION PANEL: CPT

## 2023-02-08 PROCEDURE — 74177 CT ABD & PELVIS W/CONTRAST: CPT

## 2023-02-08 PROCEDURE — 84145 PROCALCITONIN (PCT): CPT

## 2023-02-08 PROCEDURE — 85025 COMPLETE CBC W/AUTO DIFF WBC: CPT

## 2023-02-08 PROCEDURE — 83690 ASSAY OF LIPASE: CPT

## 2023-02-08 PROCEDURE — 6360000004 HC RX CONTRAST MEDICATION: Performed by: PHYSICIAN ASSISTANT

## 2023-02-08 PROCEDURE — 83550 IRON BINDING TEST: CPT

## 2023-02-08 PROCEDURE — 81001 URINALYSIS AUTO W/SCOPE: CPT

## 2023-02-08 PROCEDURE — 85610 PROTHROMBIN TIME: CPT

## 2023-02-08 PROCEDURE — 6360000002 HC RX W HCPCS: Performed by: EMERGENCY MEDICINE

## 2023-02-08 PROCEDURE — 93005 ELECTROCARDIOGRAM TRACING: CPT | Performed by: PHYSICIAN ASSISTANT

## 2023-02-08 PROCEDURE — 96375 TX/PRO/DX INJ NEW DRUG ADDON: CPT

## 2023-02-08 RX ORDER — SODIUM CHLORIDE 9 MG/ML
INJECTION, SOLUTION INTRAVENOUS PRN
Status: DISCONTINUED | OUTPATIENT
Start: 2023-02-08 | End: 2023-02-10 | Stop reason: HOSPADM

## 2023-02-08 RX ORDER — GABAPENTIN 400 MG/1
400 CAPSULE ORAL 3 TIMES DAILY
COMMUNITY

## 2023-02-08 RX ORDER — HYDROMORPHONE HYDROCHLORIDE 1 MG/ML
0.5 INJECTION, SOLUTION INTRAMUSCULAR; INTRAVENOUS; SUBCUTANEOUS ONCE
Status: COMPLETED | OUTPATIENT
Start: 2023-02-08 | End: 2023-02-08

## 2023-02-08 RX ORDER — POTASSIUM CHLORIDE 20 MEQ/1
40 TABLET, EXTENDED RELEASE ORAL ONCE
Status: COMPLETED | OUTPATIENT
Start: 2023-02-08 | End: 2023-02-08

## 2023-02-08 RX ORDER — SODIUM CHLORIDE 0.9 % (FLUSH) 0.9 %
5-40 SYRINGE (ML) INJECTION EVERY 12 HOURS SCHEDULED
Status: DISCONTINUED | OUTPATIENT
Start: 2023-02-08 | End: 2023-02-10 | Stop reason: HOSPADM

## 2023-02-08 RX ORDER — KETOROLAC TROMETHAMINE 30 MG/ML
15 INJECTION, SOLUTION INTRAMUSCULAR; INTRAVENOUS ONCE
Status: COMPLETED | OUTPATIENT
Start: 2023-02-08 | End: 2023-02-08

## 2023-02-08 RX ORDER — BUSPIRONE HYDROCHLORIDE 5 MG/1
5 TABLET ORAL 2 TIMES DAILY
Status: DISCONTINUED | OUTPATIENT
Start: 2023-02-08 | End: 2023-02-10 | Stop reason: HOSPADM

## 2023-02-08 RX ORDER — ONDANSETRON 4 MG/1
4 TABLET, ORALLY DISINTEGRATING ORAL EVERY 8 HOURS PRN
Status: DISCONTINUED | OUTPATIENT
Start: 2023-02-08 | End: 2023-02-10 | Stop reason: HOSPADM

## 2023-02-08 RX ORDER — GABAPENTIN 400 MG/1
400 CAPSULE ORAL 3 TIMES DAILY
Status: DISCONTINUED | OUTPATIENT
Start: 2023-02-08 | End: 2023-02-10 | Stop reason: HOSPADM

## 2023-02-08 RX ORDER — FUROSEMIDE 40 MG/1
40 TABLET ORAL DAILY
Status: DISCONTINUED | OUTPATIENT
Start: 2023-02-09 | End: 2023-02-10 | Stop reason: HOSPADM

## 2023-02-08 RX ORDER — ONDANSETRON 2 MG/ML
4 INJECTION INTRAMUSCULAR; INTRAVENOUS EVERY 6 HOURS PRN
Status: DISCONTINUED | OUTPATIENT
Start: 2023-02-08 | End: 2023-02-08

## 2023-02-08 RX ORDER — VENLAFAXINE HYDROCHLORIDE 150 MG/1
150 CAPSULE, EXTENDED RELEASE ORAL DAILY
Status: DISCONTINUED | OUTPATIENT
Start: 2023-02-09 | End: 2023-02-10 | Stop reason: HOSPADM

## 2023-02-08 RX ORDER — SPIRONOLACTONE 25 MG/1
50 TABLET ORAL DAILY
Status: DISCONTINUED | OUTPATIENT
Start: 2023-02-09 | End: 2023-02-09

## 2023-02-08 RX ORDER — SODIUM CHLORIDE 0.9 % (FLUSH) 0.9 %
5-40 SYRINGE (ML) INJECTION PRN
Status: DISCONTINUED | OUTPATIENT
Start: 2023-02-08 | End: 2023-02-10 | Stop reason: HOSPADM

## 2023-02-08 RX ORDER — NYSTATIN 100000 U/G
CREAM TOPICAL ONCE
Status: COMPLETED | OUTPATIENT
Start: 2023-02-08 | End: 2023-02-08

## 2023-02-08 RX ORDER — ENOXAPARIN SODIUM 100 MG/ML
40 INJECTION SUBCUTANEOUS 2 TIMES DAILY
Status: DISCONTINUED | OUTPATIENT
Start: 2023-02-08 | End: 2023-02-10 | Stop reason: HOSPADM

## 2023-02-08 RX ORDER — POLYETHYLENE GLYCOL 3350 17 G/17G
17 POWDER, FOR SOLUTION ORAL DAILY PRN
Status: DISCONTINUED | OUTPATIENT
Start: 2023-02-08 | End: 2023-02-10 | Stop reason: HOSPADM

## 2023-02-08 RX ORDER — AMITRIPTYLINE HYDROCHLORIDE 50 MG/1
50 TABLET, FILM COATED ORAL NIGHTLY
Status: DISCONTINUED | OUTPATIENT
Start: 2023-02-08 | End: 2023-02-10 | Stop reason: HOSPADM

## 2023-02-08 RX ORDER — ALBUTEROL SULFATE 90 UG/1
2 AEROSOL, METERED RESPIRATORY (INHALATION) 4 TIMES DAILY PRN
Status: DISCONTINUED | OUTPATIENT
Start: 2023-02-08 | End: 2023-02-10 | Stop reason: HOSPADM

## 2023-02-08 RX ADMIN — CEFTRIAXONE SODIUM 1000 MG: 1 INJECTION, POWDER, FOR SOLUTION INTRAMUSCULAR; INTRAVENOUS at 21:38

## 2023-02-08 RX ADMIN — Medication 10 ML: at 21:29

## 2023-02-08 RX ADMIN — HYDROMORPHONE HYDROCHLORIDE 0.5 MG: 1 INJECTION, SOLUTION INTRAMUSCULAR; INTRAVENOUS; SUBCUTANEOUS at 16:26

## 2023-02-08 RX ADMIN — HYDROMORPHONE HYDROCHLORIDE 0.5 MG: 1 INJECTION, SOLUTION INTRAMUSCULAR; INTRAVENOUS; SUBCUTANEOUS at 23:21

## 2023-02-08 RX ADMIN — IOHEXOL 65 ML: 350 INJECTION, SOLUTION INTRAVENOUS at 14:25

## 2023-02-08 RX ADMIN — BUSPIRONE HYDROCHLORIDE 5 MG: 5 TABLET ORAL at 23:21

## 2023-02-08 RX ADMIN — CEFEPIME 1000 MG: 1 INJECTION, POWDER, FOR SOLUTION INTRAMUSCULAR; INTRAVENOUS at 15:30

## 2023-02-08 RX ADMIN — NYSTATIN: 100000 CREAM TOPICAL at 19:02

## 2023-02-08 RX ADMIN — KETOROLAC TROMETHAMINE 15 MG: 30 INJECTION, SOLUTION INTRAMUSCULAR; INTRAVENOUS at 16:22

## 2023-02-08 RX ADMIN — POTASSIUM CHLORIDE 40 MEQ: 1500 TABLET, EXTENDED RELEASE ORAL at 19:08

## 2023-02-08 RX ADMIN — ENOXAPARIN SODIUM 40 MG: 100 INJECTION SUBCUTANEOUS at 21:29

## 2023-02-08 RX ADMIN — GABAPENTIN 400 MG: 400 CAPSULE ORAL at 23:21

## 2023-02-08 ASSESSMENT — PAIN SCALES - GENERAL
PAINLEVEL_OUTOF10: 6
PAINLEVEL_OUTOF10: 7
PAINLEVEL_OUTOF10: 9
PAINLEVEL_OUTOF10: 1
PAINLEVEL_OUTOF10: 7
PAINLEVEL_OUTOF10: 7

## 2023-02-08 ASSESSMENT — PAIN DESCRIPTION - DESCRIPTORS
DESCRIPTORS: ACHING
DESCRIPTORS: ACHING

## 2023-02-08 ASSESSMENT — PAIN DESCRIPTION - LOCATION
LOCATION: BACK;ABDOMEN
LOCATION: ABDOMEN
LOCATION: ABDOMEN
LOCATION: BACK;ABDOMEN

## 2023-02-08 ASSESSMENT — PAIN DESCRIPTION - ORIENTATION
ORIENTATION: LOWER
ORIENTATION: LEFT

## 2023-02-08 ASSESSMENT — PAIN - FUNCTIONAL ASSESSMENT
PAIN_FUNCTIONAL_ASSESSMENT: 0-10
PAIN_FUNCTIONAL_ASSESSMENT: PREVENTS OR INTERFERES SOME ACTIVE ACTIVITIES AND ADLS

## 2023-02-08 ASSESSMENT — PAIN DESCRIPTION - PAIN TYPE: TYPE: ACUTE PAIN

## 2023-02-08 NOTE — ED PROVIDER NOTES
[address]        Pt Name: Kali Peoples  MRN: [id number]  [place] 1975  Date of evaluation: 2/8/2023  Provider: Camilo Steve PA-C  PCP: Fabio Tierney MD  Note Started: 12:04 PM EST 2/8/23       I have seen and evaluated this patient with my supervising physician Anca Gómez, [address]       Chief Complaint   Patient presents with    Abdominal Pain     Pt states pain started \"when I was in group home. \"  States started 6 weeks ago. Pt states that she has noticed progressive abd swelling. States she is experiencing incontinence. Pt states that she was seen by PCP in January who prescribed her diuretics. Pt states that she has hepatitis B and C. HISTORY OF PRESENT ILLNESS: 1 or more Elements     History From: Patient    Limitations to history : None    Kali Peoples is a 51 y.o. female who presents to the emergency department with primary complaint of abdominal pain and bloating. States onset 6 weeks ago. States incarcerated at that time when it began. States she was in [facility] group home due to a 5year-old warrant having had Seroquel in her purse. Since being released the patient continues with abdominal pain and bloating which has progressed. She reports no nausea, vomiting, diarrhea or constipation. She does report prior abdominal surgeries including cholecystectomy, D&C, abdominal wall hernia repair 16 years ago and right ovary removal.    Patient also reports seeing urinary incontinence since which has been ongoing for the past several weeks. She reports no dysuria associated. The patient reporting bilateral lower extremity edema a bit more than her baseline and currently on Lasix 40 mg. The patient indicating open wound posterior left knee. We will need to evaluate when she is moved to a bed. Patient expresses concern regarding cellulitis bilateral extremities.     Patient does live in a residence with 2 other individuals and states they are not much help. She is concerned about living independently at this time. Nursing Notes were all reviewed and agreed with or any disagreements were addressed in the HPI. REVIEW OF SYSTEMS :      Review of Systems    Positives and Pertinent negatives as per HPI.      SURGICAL HISTORY     Past Surgical History:   Procedure Laterality Date    ANKLE FRACTURE SURGERY      CARPAL TUNNEL RELEASE       SECTION      CHOLECYSTECTOMY      KNEE ARTHROSCOPY Bilateral     OVARY REMOVAL Right     TOTAL ANKLE ARTHROPLASTY      TUBAL LIGATION         [address]       Current Discharge Medication List        CONTINUE these medications which have NOT CHANGED    Details   spironolactone (ALDACTONE) 50 MG tablet Take 1 tablet by mouth daily  Qty: 30 tablet, Refills: 1    Associated Diagnoses: Other cirrhosis of liver (HCC)      furosemide (LASIX) 40 MG tablet Take 1 tablet by mouth daily  Qty: 30 tablet, Refills: 0    Associated Diagnoses: Other cirrhosis of liver (HCC)      albuterol sulfate HFA (VENTOLIN HFA) 108 (90 Base) MCG/ACT inhaler Inhale 2 puffs into the lungs 4 times daily as needed for Wheezing  Qty: 54 g, Refills: 1    Associated Diagnoses: Simple chronic bronchitis (HCC)      gabapentin (NEURONTIN) 400 MG capsule TAKE ONE CAPSULE BY MOUTH THREE TIMES A DAY  Qty: 90 capsule, Refills: 0    Associated Diagnoses: Chronic bilateral low back pain with sciatica, sciatica laterality unspecified      amitriptyline (ELAVIL) 50 MG tablet Take 1 tablet by mouth nightly  Qty: 90 tablet, Refills: 1    Associated Diagnoses: Anxiety; Primary insomnia      amLODIPine (NORVASC) 2.5 MG tablet Take 1 tablet by mouth daily  Qty: 90 tablet, Refills: 1    Associated Diagnoses: Primary hypertension      Compression Stockings MISC by Does not apply route  Qty: 1 each, Refills: 0    Associated Diagnoses: Venous stasis dermatitis, unspecified laterality      busPIRone (BUSPAR) 5 MG tablet Take 1 tablet by mouth 2 times daily  Qty: 60 tablet, Refills: 0    Associated Diagnoses: Anxiety      venlafaxine (EFFEXOR XR) 150 MG extended release capsule Take 1 capsule by mouth daily  Qty: 30 capsule, Refills: 2    Associated Diagnoses: Anxiety      umeclidinium-vilanterol (ANORO ELLIPTA) 62.5-25 MCG/INH AEPB inhaler Inhale 1 puff into the lungs daily  Qty: 1 each, Refills: 2    Associated Diagnoses: Pulmonary hypertension (HCC)      diclofenac (VOLTAREN) 50 MG EC tablet Take 1 tablet by mouth 3 times daily (with meals)  Qty: 60 tablet, Refills: 3    Associated Diagnoses: Primary osteoarthritis of right knee; Primary osteoarthritis of left knee             ALLERGIES     Darvocet a500 [propoxyphene n-acetaminophen] and Penicillins    FAMILYHISTORY       Family History   Problem Relation Age of Onset    Depression Mother     Anxiety Disorder Mother     Arthritis Other     Asthma Other     Cancer Other     Diabetes Other     High Blood Pressure Other     High Blood Pressure Sister     Depression Sister     Anxiety Disorder Sister     Heart Disease Maternal Grandfather         SOCIAL HISTORY       Social History     Tobacco Use    Smoking status: Every Day     Packs/day: 0.50     Years: 35.00     Pack years: 17.50     Types: Cigarettes     Start date: 1987    Smokeless tobacco: Never   Vaping Use    Vaping Use: Never used   Substance Use Topics    Alcohol use: No     Comment: 1x a year maybe     Drug use: Not Currently     Comment: in past with death of children        SCREENINGS        [place] Coma Scale  Eye Opening: Spontaneous  Best Verbal Response: Oriented  Best Motor Response: Obeys commands  [place] Coma Scale Score: 15                CIWA Assessment  BP: 133/82  Heart Rate: 91           PHYSICAL EXAM  1 or more Elements     ED Triage Vitals [02/08/23 1148]   BP Temp Temp Source Heart Rate Resp SpO2 Height Weight   (!) 160/93 97.7 °F (36.5 °C) Oral 98 14 99 % 5' 6\" (1.676 m) (!) 350 lb (158.8 kg) Physical Exam  Vitals and nursing note reviewed. Constitutional:       Appearance: She is well-developed. She is obese. Comments: Patient is overweight at 350 pounds with a BMI score of 56.49. HENT:      Head: Normocephalic and atraumatic. Right Ear: External ear normal.      Left Ear: External ear normal.   Eyes:      General: No scleral icterus. Right eye: No discharge. Left eye: No discharge. Conjunctiva/sclera: Conjunctivae normal.   Cardiovascular:      Rate and Rhythm: Normal rate and regular rhythm. Heart sounds: Normal heart sounds. Pulmonary:      Effort: Pulmonary effort is normal.      Breath sounds: Normal breath sounds. Abdominal:      General: Abdomen is flat. Bowel sounds are normal. There is distension. Palpations: Abdomen is soft. Tenderness: There is abdominal tenderness. Musculoskeletal:         General: Normal range of motion. Cervical back: Normal range of motion and neck supple. Right lower leg: Edema present. Left lower leg: Edema present. Skin:     General: Skin is warm and dry. Findings: Erythema present. Comments: Some erythema bilateral lower extremities. Neurological:      General: No focal deficit present. Mental Status: She is alert and oriented to person, place, and time. Mental status is at baseline. Psychiatric:         Mood and Affect: Mood normal.         Behavior: Behavior normal.         Thought Content:  Thought content normal.         Judgment: Judgment normal.       DIAGNOSTIC RESULTS   LABS:    Labs Reviewed   CBC WITH AUTO DIFFERENTIAL - Abnormal; Notable for the following components:       Result Value    .3 (*)     RDW 17.3 (*)     Platelets 938 (*)     All other components within normal limits   BASIC METABOLIC PANEL W/ REFLEX TO MG FOR LOW K - Abnormal; Notable for the following components:    Sodium 135 (*)     Potassium reflex Magnesium 3.3 (*)     Glucose 131 (*) BUN 5 (*)     All other components within normal limits   HEPATIC FUNCTION PANEL - Abnormal; Notable for the following components:    Albumin 2.4 (*)     Alkaline Phosphatase 232 (*)     AST 58 (*)     Total Bilirubin 1.3 (*)     Bilirubin, Direct 0.5 (*)     All other components within normal limits   LACTIC ACID - Abnormal; Notable for the following components:    Lactic Acid 2.9 (*)     All other components within normal limits   URINALYSIS WITH REFLEX TO CULTURE - Abnormal; Notable for the following components:    Color, UA DARK YELLOW (*)     Clarity, UA CLOUDY (*)     Blood, Urine LARGE (*)     Protein, UA TRACE (*)     Leukocyte Esterase, Urine LARGE (*)     All other components within normal limits   SEDIMENTATION RATE - Abnormal; Notable for the following components:    Sed Rate 57 (*)     All other components within normal limits   C-REACTIVE PROTEIN - Abnormal; Notable for the following components:    CRP 22.1 (*)     All other components within normal limits   MICROSCOPIC URINALYSIS - Abnormal; Notable for the following components:    Bacteria, UA 2+ (*)     WBC, UA 55 (*)     Epithelial Cells, UA 9 (*)     All other components within normal limits   PROTIME-INR - Abnormal; Notable for the following components:    Protime 17.0 (*)     INR 1.39 (*)     All other components within normal limits   CULTURE, BLOOD 1   CULTURE, BLOOD 2   CULTURE, URINE   CULTURE, BODY FLUID   LIPASE   TROPONIN   PROCALCITONIN   MAGNESIUM   HCG, SERUM, QUALITATIVE   BRAIN NATRIURETIC PEPTIDE   AMMONIA   IRON AND TIBC   VITAMIN B12 & FOLATE   CELL COUNT WITH DIFFERENTIAL, BODY FLUID   PROTEIN, BODY FLUID   LACTATE DEHYDROGENASE, BODY FLUID   ALBUMIN, FLUID       When ordered only abnormal lab results are displayed. All other labs were within normal range or not returned as of this dictation. EKG:  When ordered, EKG's are interpreted by the Emergency Department Physician in the absence of a cardiologist.  Please see their note for interpretation of EKG. RADIOLOGY:   Non-plain film images such as CT, Ultrasound and MRI are read by the radiologist. Plain radiographic images are visualized and preliminarily interpreted by the ED Provider with the below findings:    Chest x-ray viewed by myself and interpreted by radiology shows no acute cardiopulmonary abnormality. X-ray bilateral lower extremities showing advanced arthritic changes no gas in tissue. Tissue edema noted. Som pelvic CT scan with IV contrast showing advanced cirrhosis of the liver with moderate to large ascites and moderate splenomegaly. Portal hypertension with collaterals noted. Abdominal wall and fat-containing ventral hernia showing extensive subcutaneous edema in keeping with anasarca. Interpretation per the Radiologist below, if available at the time of this note:    CT ABDOMEN PELVIS W IV CONTRAST Additional Contrast? None   Final Result   Advanced cirrhosis of the liver, with moderate to large amount of ascites,   moderate splenomegaly, and evidence of portal hypertension with portosystemic   collaterals. Mild distension of some proximal loop of small bowel. This may represent   mild obstruction or localized ileus and requires clinical correlation. Fat containing ventral hernia. Extensive subcutaneous fat around the abdomen   and pelvis in keeping with anasarca. Degenerative disc disease of the L1-L2 disc. XR CHEST PORTABLE   Final Result   1. No acute chest abnormality. 2. Right tibia and fibula demonstrate no acute fractures or dislocations. Moderate to severe right knee degenerative changes. Soft tissue edema. 3. Left tibia and fibula demonstrate no acute fractures or dislocations. Severe tricompartmental degenerative changes of the left knee. Soft tissue   edema. XR TIBIA FIBULA LEFT (2 VIEWS)   Final Result   1. No acute chest abnormality.    2. Right tibia and fibula demonstrate no acute fractures or dislocations. Moderate to severe right knee degenerative changes. Soft tissue edema. 3. Left tibia and fibula demonstrate no acute fractures or dislocations. Severe tricompartmental degenerative changes of the left knee. Soft tissue   edema. XR TIBIA FIBULA RIGHT (2 VIEWS)   Final Result   1. No acute chest abnormality. 2. Right tibia and fibula demonstrate no acute fractures or dislocations. Moderate to severe right knee degenerative changes. Soft tissue edema. 3. Left tibia and fibula demonstrate no acute fractures or dislocations. Severe tricompartmental degenerative changes of the left knee. Soft tissue   edema. No results found. No results found. PROCEDURES   Unless otherwise noted below, none     Procedures    CRITICAL CARE TIME (.cctime)   Critical Care  There was a high probability of life-threatening clinical deterioration in the patient's condition requiring my urgent intervention. Total critical care time with the patient was 35 minutes excluding separately reportable procedures. Critical care required due to patients presentation of acute abdominal pain with enlarging abdomen over the past 6 weeks. Patient without known cirrhosis but with known hepatitis ANC. Patient CT scan showing advanced cirrhotic liver with portal hypertension and splenomegaly. Also showing moderate to large ascites likely account for the patient's increasing abdominal size and abdominal pain complaint. Low suspicion SBP. UA positive for UTI and antibiotics initiated. PAST MEDICAL HISTORY      has a past medical history of Anxiety, Arthritis, Asthma, COPD (chronic obstructive pulmonary disease) ([address] (6/3/2022), Depression, and RSD lower limb.      EMERGENCY DEPARTMENT COURSE and DIFFERENTIAL DIAGNOSIS/MDM:   Vitals:    Vitals:    02/08/23 1736 02/08/23 1800 02/08/23 1830 02/08/23 1857   BP: 118/70 134/83 (!) 137/92 133/82   Pulse: 96 95 97 91   Resp: 14 21 17 20   Temp:    97.9 °F (36.6 °C)   TempSrc:    Oral   SpO2: 99% 97% 97% 100%   Weight:       Height:           Patient was given the following medications:  Medications   sodium chloride flush 0.9 % injection 5-40 mL (has no administration in time range)   sodium chloride flush 0.9 % injection 5-40 mL (has no administration in time range)   0.9 % sodium chloride infusion (has no administration in time range)   iohexol (OMNIPAQUE 350) solution 65 mL (65 mLs IntraVENous Given 2/8/23 1425)   cefepime (MAXIPIME) 1,000 mg in sodium chloride 0.9 % 50 mL IVPB (mini-bag) (0 mg IntraVENous Stopped 2/8/23 1600)   nystatin (MYCOSTATIN) cream ( Topical Given 2/8/23 1902)   HYDROmorphone HCl PF (DILAUDID) injection 0.5 mg (0.5 mg IntraVENous Given 2/8/23 1626)   ketorolac (TORADOL) injection 15 mg (15 mg IntraVENous Given 2/8/23 1622)   potassium chloride (KLOR-CON M) extended release tablet 40 mEq (40 mEq Oral Given 2/8/23 1908)             Is this patient to be included in the SEP-1 Core Measure due to severe sepsis or septic shock? No   Exclusion criteria - the patient is NOT to be included for SEP-1 Core Measure due to: Infection is not suspected    Chronic Conditions affecting care: Hepatitis B and C. Patient did report prior consideration for paracentesis but not performed. Patient not aware of cirrhosis. She relates the probable cause of cirrhosis was IV drug use resulting in hepatitis B and C. Not alcoholic.   has a past medical history of Anxiety, Arthritis, Asthma, COPD (chronic obstructive pulmonary disease) ([facility] [address] (6/3/2022), Depression, and RSD lower limb. CONSULTS: (Who and What was discussed)  IP CONSULT TO HOSPITALIST  IP CONSULT TO INTERVENTIONAL RADIOLOGY  IP CONSULT TO GI      Social Determinants Significantly Affecting Health : None    Records Reviewed (External and Source) none    CC/HPI Summary, DDx, ED Course, and Reassessment: The patient presenting with increasing abdominal pain and swelling over the past 6 weeks. Patient without history of IV drug use and not alcohol. Patient has advanced cirrhotic liver with large ascites noted on CT scan. Patient has splenomegaly and portal hypertension with collaterals. I do believe the abdominal pain is related to the large ascites and paracentesis may benefit the patient. Patient found to be UTI with cloudy urine, large leukocyte and microscopic showing 2+ bacteria, WBC 55 and epithelial cells 9. Culture pending at this time. Cefepime 1 g ordered by attending physician. The patient WBC 6.9. The patient's sodium 135 and potassium 3.3. From a hepatic standpoint the patient is albumin a bit low at 2.4, AST 58, total bilirubin 1.3 and direct bilirubin 0.5. Lactic acid a bit elevated at 2.9. Procalcitonin not elevated at 0.08. Her inflammatory markers sed rate 57 and CRP 22.1, both elevated. The abdominal pelvic CT scan confirming advanced cirrhosis with large ascites, moderate splenomegaly and portal hypertension. She has some dilated loops of bowel likely related to the above showing local ileus I doubt obstruction at this time. Patient has extensive subcutaneous fat around the abdomen and pelvis consistent with anasarca. Chest x-ray showing no acute cardiopulmonary abnormality. I did x-ray bilateral tib-fib to evaluate for gas and tissue and subcutaneous edema noted and no gas in tissue. She has advanced arthritic changes bilateral knees with severe tricompartmental arthritis. ED treatment thus far Dilaudid 0.5 mg IV x1, Toradol 15 mg IV x1. Order for Mycostatin cream to be applied posterior bilateral knees for some tissue breakdown. Disposition Considerations (tests considered but not done, Admit vs D/C, Shared Decision Making, Pt Expectation of Test or Tx.): Patient be admitted for further management of acute cystitis with hematuria, advanced cirrhosis with large ascites, anasarca, low albumin, elevated lactic acid and wounds bilateral posterior knees.     I did send [employer] note to hospitalist at 4:16 PM.    I am the Primary Clinician of Record. FINAL IMPRESSION      1. Acute cystitis with hematuria    2. Cirrhosis of liver with ascites, unspecified hepatic cirrhosis type ([facility] [address]    3. Anasarca    4. Hypoalbuminemia    5. Elevated lactic acid level    6. Generalized abdominal pain    7. Breakdown of skin tissue    8. Splenomegaly          DISPOSITION/PLAN     DISPOSITION Admitted 02/08/2023 05:08:42 PM      PATIENT REFERRED TO:  No follow-up provider specified. DISCHARGE MEDICATIONS:  Current Discharge Medication List          DISCONTINUED MEDICATIONS:  Current Discharge Medication List                 (Please note that portions of this note were completed with a voice recognition program.  Efforts were made to edit the dictations but occasionally words are mis-transcribed. )    Jared Vee PA-C (electronically signed)        Jared Vee PA-C  02/08/23 1910

## 2023-02-08 NOTE — H&P
HOSPITALISTS HISTORY AND PHYSICAL    2/8/2023 5:23 PM    Patient Information:  Echo Leung is a 51 y.o. female [id number]  PCP:  Lex Treviño MD (Tel: [phone] )    Chief complaint:    Chief Complaint   Patient presents with    Abdominal Pain     Pt states pain started \"when I was in senior care. \"  States started 6 weeks ago. Pt states that she has noticed progressive abd swelling. States she is experiencing incontinence. Pt states that she was seen by PCP in January who prescribed her diuretics. Pt states that she has hepatitis B and C. History of Present Illness:  Claritza Edward is a 51 y.o. female increased abdominal swelling over the last 6 weeks. 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 Respiratory: Negative for shortness of breath,wheezing  Cardiovascular: Negative for chest pain, palpitations   Gastrointestinal: see above  Genitourinary: Negative for polyuria, dysuria   Hematologic/Lymphatic: Negative for bleeding tendency, easy bruising  Musculoskeletal: Negative for myalgias and arthralgias  Neurologic: Negative for confusion,dysarthria. Skin: Negative for itching,rash  Psychiatric: Negative for depression,anxiety, agitation. Endocrine: Negative for polydipsia,polyuria,heat /cold intolerance. Past Medical History:   has a past medical history of Anxiety, Arthritis, Asthma, COPD (chronic obstructive pulmonary disease) ([facility] [address] Depression, and RSD lower limb.      Past Surgical History:   has a past surgical history that includes  section; Ovary removal (Right); Carpal tunnel release; Tubal ligation (); Total ankle arthroplasty; Ankle fracture surgery; Knee arthroscopy (Bilateral); and Cholecystectomy. Medications:  No current facility-administered medications on file prior to encounter. Current Outpatient Medications on File Prior to Encounter   Medication Sig Dispense Refill    spironolactone (ALDACTONE) 50 MG tablet Take 1 tablet by mouth daily 30 tablet 1    furosemide (LASIX) 40 MG tablet Take 1 tablet by mouth daily 30 tablet 0    albuterol sulfate HFA (VENTOLIN HFA) 108 (90 Base) MCG/ACT inhaler Inhale 2 puffs into the lungs 4 times daily as needed for Wheezing 54 g 1    gabapentin (NEURONTIN) 400 MG capsule TAKE ONE CAPSULE BY MOUTH THREE TIMES A DAY 90 capsule 0    amitriptyline (ELAVIL) 50 MG tablet Take 1 tablet by mouth nightly 90 tablet 1    amLODIPine (NORVASC) 2.5 MG tablet Take 1 tablet by mouth daily 90 tablet 1    Compression Stockings MISC by Does not apply route 1 each 0    busPIRone (BUSPAR) 5 MG tablet Take 1 tablet by mouth 2 times daily 60 tablet 0    venlafaxine (EFFEXOR XR) 150 MG extended release capsule Take 1 capsule by mouth daily 30 capsule 2    umeclidinium-vilanterol (ANORO ELLIPTA) 62.5-25 MCG/INH AEPB inhaler Inhale 1 puff into the lungs daily 1 each 2    diclofenac (VOLTAREN) 50 MG EC tablet Take 1 tablet by mouth 3 times daily (with meals) 60 tablet 3       Allergies: Allergies   Allergen Reactions    Darvocet A500 [Propoxyphene N-Acetaminophen] Hives    Penicillins Hives        Social History:  Patient Lives at home   reports that she has been smoking cigarettes. She started smoking about 36 years ago. She has a 17.50 pack-year smoking history. She has never used smokeless tobacco. She reports that she does not currently use drugs. She reports that she does not drink alcohol.      Family History:  family history includes Anxiety Disorder in her mother and sister; Arthritis in an other family member; Asthma in an other family member; Cancer in an other family member; Depression in her mother and sister; Diabetes in an other family member; Heart Disease in her maternal grandfather; High Blood Pressure in her sister and another family member. ,     Physical Exam:  /80   Pulse 92   Temp 97.7 °F (36.5 °C) (Oral)   Resp 17   Ht 5' 6\" (1.676 m)   Wt (!) 350 lb (158.8 kg)   SpO2 97%   BMI 56.49 kg/m²     General appearance:  Appears comfortable. AAOx3  HEENT: atraumatic, Pupils equal, muscous membranes moist, no masses appreciated  Cardiovascular: Regular rate and rhythm no murmurs appreciated  Respiratory: CTAB no wheezing  Gastrointestinal: + abdominal distention nontender nontender  EXT: no edema  Neurology: no gross focal deficts  Psychiatry: Appropriate affect. Not agitated  Skin: Warm, dry, no rashes appreciated    Labs:  CBC:   Lab Results   Component Value Date/Time    WBC 6.9 02/08/2023 12:17 PM    RBC 4.07 02/08/2023 12:17 PM    HGB 13.2 02/08/2023 12:17 PM    HCT 40.8 02/08/2023 12:17 PM    .3 02/08/2023 12:17 PM    MCH 32.4 02/08/2023 12:17 PM    MCHC 32.3 02/08/2023 12:17 PM    RDW 17.3 02/08/2023 12:17 PM     02/08/2023 12:17 PM    MPV 9.4 02/08/2023 12:17 PM     BMP:    Lab Results   Component Value Date/Time     02/08/2023 12:17 PM    K 3.3 02/08/2023 12:17 PM     02/08/2023 12:17 PM    CO2 26 02/08/2023 12:17 PM    BUN 5 02/08/2023 12:17 PM    CREATININE 0.7 02/08/2023 12:17 PM    CALCIUM 8.6 02/08/2023 12:17 PM    GFRAA >60 09/01/2022 03:53 PM    LABGLOM >60 02/08/2023 12:17 PM    GLUCOSE 131 02/08/2023 12:17 PM     CT ABDOMEN PELVIS W IV CONTRAST Additional Contrast? None   Final Result   Advanced cirrhosis of the liver, with moderate to large amount of ascites,   moderate splenomegaly, and evidence of portal hypertension with portosystemic   collaterals.       Mild distension of some proximal loop of small bowel. This may represent   mild obstruction or localized ileus and requires clinical correlation. Fat containing ventral hernia. Extensive subcutaneous fat around the abdomen   and pelvis in keeping with anasarca. Degenerative disc disease of the L1-L2 disc. XR CHEST PORTABLE   Preliminary Result   1. No acute chest abnormality. 2. Right tibia and fibula demonstrate no acute fractures or dislocations. Moderate to severe right knee degenerative changes. Soft tissue edema. 3. Left tibia and fibula demonstrate no acute fractures or dislocations. Severe tricompartmental degenerative changes of the left knee. Soft tissue   edema. XR TIBIA FIBULA LEFT (2 VIEWS)   Preliminary Result   1. No acute chest abnormality. 2. Right tibia and fibula demonstrate no acute fractures or dislocations. Moderate to severe right knee degenerative changes. Soft tissue edema. 3. Left tibia and fibula demonstrate no acute fractures or dislocations. Severe tricompartmental degenerative changes of the left knee. Soft tissue   edema. XR TIBIA FIBULA RIGHT (2 VIEWS)   Preliminary Result   1. No acute chest abnormality. 2. Right tibia and fibula demonstrate no acute fractures or dislocations. Moderate to severe right knee degenerative changes. Soft tissue edema. 3. Left tibia and fibula demonstrate no acute fractures or dislocations. Severe tricompartmental degenerative changes of the left knee. Soft tissue   edema. Recent imaging reviewed    Problem List  Principal Problem:    Cirrhosis ([address]  Resolved Problems:    * No resolved hospital problems.  *        Assessment/Plan:   Ascites secondary to cirrhosis from hep b/c  - gi consult  - ir consult for paracentesis  - lasix and spironolactone    Uti  Iv atbx    Hypokalemia: replace and recheck        DVT prophylaxis lovenox  Code status full code        Admit as inpatient I anticipate hospitalization spanning more than two midnights for investigation and treatment of the above medically necessary diagnoses. Please note that some part of this chart was generated using Dragon dictation software. Although every effort was made to ensure the accuracy of this automated transcription, some errors in transcription may have occurred inadvertently. If you may need any clarification, please do not hesitate to contact me through [facility].        Mark Franks MD    2/8/2023 5:23 PM

## 2023-02-08 NOTE — ED NOTES
Report given to [facility], all questions answered, Pt transported to  55 in stable condition.      Meme Patel RN  02/08/23 5816

## 2023-02-09 ENCOUNTER — APPOINTMENT (OUTPATIENT)
Dept: INTERVENTIONAL RADIOLOGY/VASCULAR | Age: 48
DRG: 433 | End: 2023-02-09
Payer: COMMERCIAL

## 2023-02-09 LAB
A/G RATIO: 0.5 (ref 1.1–2.2)
ALBUMIN FLUID: 0.5 G/DL
ALBUMIN SERPL-MCNC: 2.2 G/DL (ref 3.4–5)
ALP BLD-CCNC: 189 U/L (ref 40–129)
ALT SERPL-CCNC: 22 U/L (ref 10–40)
ANION GAP SERPL CALCULATED.3IONS-SCNC: 5 MMOL/L (ref 3–16)
APPEARANCE FLUID: CLEAR
AST SERPL-CCNC: 55 U/L (ref 15–37)
BASOPHILS ABSOLUTE: 0.1 K/UL (ref 0–0.2)
BASOPHILS RELATIVE PERCENT: 1 %
BILIRUB SERPL-MCNC: 1.1 MG/DL (ref 0–1)
BUN BLDV-MCNC: 6 MG/DL (ref 7–20)
CALCIUM SERPL-MCNC: 8.4 MG/DL (ref 8.3–10.6)
CELL COUNT FLUID TYPE: NORMAL
CHLORIDE BLD-SCNC: 103 MMOL/L (ref 99–110)
CLOT EVALUATION: NORMAL
CO2: 29 MMOL/L (ref 21–32)
COLOR FLUID: YELLOW
CREAT SERPL-MCNC: 0.7 MG/DL (ref 0.6–1.1)
EOSINOPHILS ABSOLUTE: 0.6 K/UL (ref 0–0.6)
EOSINOPHILS RELATIVE PERCENT: 8.3 %
FERRITIN: 159.8 NG/ML (ref 15–150)
FLUID TYPE: NORMAL
FOLATE: 8.73 NG/ML (ref 4.78–24.2)
GFR SERPL CREATININE-BSD FRML MDRD: >60 ML/MIN/{1.73_M2}
GLUCOSE BLD-MCNC: 83 MG/DL (ref 70–99)
HCT VFR BLD CALC: 35.5 % (ref 36–48)
HEMOGLOBIN: 12 G/DL (ref 12–16)
IRON SATURATION: 42 % (ref 15–50)
IRON: 86 UG/DL (ref 37–145)
LD, FLUID: 51 U/L
LYMPHOCYTES ABSOLUTE: 3.2 K/UL (ref 1–5.1)
LYMPHOCYTES RELATIVE PERCENT: 46.2 %
LYMPHOCYTES, BODY FLUID: 38 %
MACROPHAGE FLUID: 29 %
MAGNESIUM: 2 MG/DL (ref 1.8–2.4)
MCH RBC QN AUTO: 33.3 PG (ref 26–34)
MCHC RBC AUTO-ENTMCNC: 33.7 G/DL (ref 31–36)
MCV RBC AUTO: 99 FL (ref 80–100)
MESOTHELIAL FLUID: 4 %
MONOCYTE, FLUID: 25 %
MONOCYTES ABSOLUTE: 0.7 K/UL (ref 0–1.3)
MONOCYTES RELATIVE PERCENT: 10.9 %
NEUTROPHIL, FLUID: 4 %
NEUTROPHILS ABSOLUTE: 2.3 K/UL (ref 1.7–7.7)
NEUTROPHILS RELATIVE PERCENT: 33.6 %
NUCLEATED CELLS FLUID: 455 /CUMM
NUMBER OF CELLS COUNTED FLUID: 100
PDW BLD-RTO: 16.6 % (ref 12.4–15.4)
PLATELET # BLD: 111 K/UL (ref 135–450)
PMV BLD AUTO: 8.9 FL (ref 5–10.5)
POTASSIUM REFLEX MAGNESIUM: 3.4 MMOL/L (ref 3.5–5.1)
PROTEIN FLUID: 0.9 G/DL
RBC # BLD: 3.59 M/UL (ref 4–5.2)
RBC FLUID: <1000 /CUMM
SODIUM BLD-SCNC: 137 MMOL/L (ref 136–145)
TOTAL IRON BINDING CAPACITY: 205 UG/DL (ref 260–445)
TOTAL PROTEIN: 6.5 G/DL (ref 6.4–8.2)
URINE CULTURE, ROUTINE: NORMAL
VITAMIN B-12: 1366 PG/ML (ref 211–911)
WBC # BLD: 6.9 K/UL (ref 4–11)

## 2023-02-09 PROCEDURE — 94760 N-INVAS EAR/PLS OXIMETRY 1: CPT

## 2023-02-09 PROCEDURE — 86706 HEP B SURFACE ANTIBODY: CPT

## 2023-02-09 PROCEDURE — 86038 ANTINUCLEAR ANTIBODIES: CPT

## 2023-02-09 PROCEDURE — 6370000000 HC RX 637 (ALT 250 FOR IP): Performed by: FAMILY MEDICINE

## 2023-02-09 PROCEDURE — 84157 ASSAY OF PROTEIN OTHER: CPT

## 2023-02-09 PROCEDURE — 82103 ALPHA-1-ANTITRYPSIN TOTAL: CPT

## 2023-02-09 PROCEDURE — 2500000003 HC RX 250 WO HCPCS: Performed by: FAMILY MEDICINE

## 2023-02-09 PROCEDURE — 82104 ALPHA-1-ANTITRYPSIN PHENO: CPT

## 2023-02-09 PROCEDURE — 6360000002 HC RX W HCPCS: Performed by: FAMILY MEDICINE

## 2023-02-09 PROCEDURE — 86704 HEP B CORE ANTIBODY TOTAL: CPT

## 2023-02-09 PROCEDURE — 80053 COMPREHEN METABOLIC PANEL: CPT

## 2023-02-09 PROCEDURE — 82728 ASSAY OF FERRITIN: CPT

## 2023-02-09 PROCEDURE — 6370000000 HC RX 637 (ALT 250 FOR IP): Performed by: PHYSICIAN ASSISTANT

## 2023-02-09 PROCEDURE — 2580000003 HC RX 258: Performed by: INTERNAL MEDICINE

## 2023-02-09 PROCEDURE — 83516 IMMUNOASSAY NONANTIBODY: CPT

## 2023-02-09 PROCEDURE — 6360000002 HC RX W HCPCS: Performed by: INTERNAL MEDICINE

## 2023-02-09 PROCEDURE — C1729 CATH, DRAINAGE: HCPCS

## 2023-02-09 PROCEDURE — 87522 HEPATITIS C REVRS TRNSCRPJ: CPT

## 2023-02-09 PROCEDURE — 86015 ACTIN ANTIBODY EACH: CPT

## 2023-02-09 PROCEDURE — 87015 SPECIMEN INFECT AGNT CONCNTJ: CPT

## 2023-02-09 PROCEDURE — 6370000000 HC RX 637 (ALT 250 FOR IP): Performed by: INTERNAL MEDICINE

## 2023-02-09 PROCEDURE — 87070 CULTURE OTHR SPECIMN AEROBIC: CPT

## 2023-02-09 PROCEDURE — 49083 ABD PARACENTESIS W/IMAGING: CPT

## 2023-02-09 PROCEDURE — 1200000000 HC SEMI PRIVATE

## 2023-02-09 PROCEDURE — 0W9G3ZZ DRAINAGE OF PERITONEAL CAVITY, PERCUTANEOUS APPROACH: ICD-10-PCS | Performed by: STUDENT IN AN ORGANIZED HEALTH CARE EDUCATION/TRAINING PROGRAM

## 2023-02-09 PROCEDURE — 88112 CYTOPATH CELL ENHANCE TECH: CPT

## 2023-02-09 PROCEDURE — 83615 LACTATE (LD) (LDH) ENZYME: CPT

## 2023-02-09 PROCEDURE — 87341 HEP B SURFACE AG NEUTRLZJ IA: CPT

## 2023-02-09 PROCEDURE — P9047 ALBUMIN (HUMAN), 25%, 50ML: HCPCS | Performed by: PHYSICIAN ASSISTANT

## 2023-02-09 PROCEDURE — 89051 BODY FLUID CELL COUNT: CPT

## 2023-02-09 PROCEDURE — 86708 HEPATITIS A ANTIBODY: CPT

## 2023-02-09 PROCEDURE — 87205 SMEAR GRAM STAIN: CPT

## 2023-02-09 PROCEDURE — 85025 COMPLETE CBC W/AUTO DIFF WBC: CPT

## 2023-02-09 PROCEDURE — 87340 HEPATITIS B SURFACE AG IA: CPT

## 2023-02-09 PROCEDURE — 82390 ASSAY OF CERULOPLASMIN: CPT

## 2023-02-09 PROCEDURE — 6360000002 HC RX W HCPCS: Performed by: PHYSICIAN ASSISTANT

## 2023-02-09 PROCEDURE — 83735 ASSAY OF MAGNESIUM: CPT

## 2023-02-09 PROCEDURE — 88305 TISSUE EXAM BY PATHOLOGIST: CPT

## 2023-02-09 PROCEDURE — 36415 COLL VENOUS BLD VENIPUNCTURE: CPT

## 2023-02-09 PROCEDURE — 82042 OTHER SOURCE ALBUMIN QUAN EA: CPT

## 2023-02-09 RX ORDER — MORPHINE SULFATE 2 MG/ML
2 INJECTION, SOLUTION INTRAMUSCULAR; INTRAVENOUS
Status: DISCONTINUED | OUTPATIENT
Start: 2023-02-09 | End: 2023-02-10 | Stop reason: HOSPADM

## 2023-02-09 RX ORDER — SPIRONOLACTONE 25 MG/1
100 TABLET ORAL DAILY
Status: DISCONTINUED | OUTPATIENT
Start: 2023-02-10 | End: 2023-02-10 | Stop reason: HOSPADM

## 2023-02-09 RX ORDER — ALBUMIN (HUMAN) 12.5 G/50ML
25 SOLUTION INTRAVENOUS ONCE
Status: COMPLETED | OUTPATIENT
Start: 2023-02-09 | End: 2023-02-09

## 2023-02-09 RX ORDER — MORPHINE SULFATE 4 MG/ML
4 INJECTION, SOLUTION INTRAMUSCULAR; INTRAVENOUS
Status: DISCONTINUED | OUTPATIENT
Start: 2023-02-09 | End: 2023-02-10 | Stop reason: HOSPADM

## 2023-02-09 RX ORDER — LIDOCAINE HYDROCHLORIDE 10 MG/ML
10 INJECTION, SOLUTION EPIDURAL; INFILTRATION; INTRACAUDAL; PERINEURAL ONCE
Status: COMPLETED | OUTPATIENT
Start: 2023-02-09 | End: 2023-02-09

## 2023-02-09 RX ORDER — POTASSIUM CHLORIDE 20 MEQ/1
40 TABLET, EXTENDED RELEASE ORAL ONCE
Status: COMPLETED | OUTPATIENT
Start: 2023-02-09 | End: 2023-02-09

## 2023-02-09 RX ADMIN — FUROSEMIDE 40 MG: 40 TABLET ORAL at 12:28

## 2023-02-09 RX ADMIN — Medication 10 ML: at 22:27

## 2023-02-09 RX ADMIN — BUSPIRONE HYDROCHLORIDE 5 MG: 5 TABLET ORAL at 22:23

## 2023-02-09 RX ADMIN — CEFTRIAXONE SODIUM 1000 MG: 1 INJECTION, POWDER, FOR SOLUTION INTRAMUSCULAR; INTRAVENOUS at 22:28

## 2023-02-09 RX ADMIN — Medication 10 ML: at 22:26

## 2023-02-09 RX ADMIN — GABAPENTIN 400 MG: 400 CAPSULE ORAL at 12:28

## 2023-02-09 RX ADMIN — POTASSIUM CHLORIDE 40 MEQ: 1500 TABLET, EXTENDED RELEASE ORAL at 12:28

## 2023-02-09 RX ADMIN — GABAPENTIN 400 MG: 400 CAPSULE ORAL at 15:19

## 2023-02-09 RX ADMIN — Medication 10 ML: at 12:33

## 2023-02-09 RX ADMIN — MORPHINE SULFATE 4 MG: 4 INJECTION, SOLUTION INTRAMUSCULAR; INTRAVENOUS at 15:16

## 2023-02-09 RX ADMIN — ALBUMIN (HUMAN) 25 G: 0.25 INJECTION, SOLUTION INTRAVENOUS at 15:29

## 2023-02-09 RX ADMIN — Medication 10 ML: at 12:32

## 2023-02-09 RX ADMIN — GABAPENTIN 400 MG: 400 CAPSULE ORAL at 22:23

## 2023-02-09 RX ADMIN — LIDOCAINE HYDROCHLORIDE 10 ML: 10 INJECTION, SOLUTION EPIDURAL; INFILTRATION; INTRACAUDAL; PERINEURAL at 15:44

## 2023-02-09 RX ADMIN — MORPHINE SULFATE 2 MG: 2 INJECTION, SOLUTION INTRAMUSCULAR; INTRAVENOUS at 18:53

## 2023-02-09 RX ADMIN — BUSPIRONE HYDROCHLORIDE 5 MG: 5 TABLET ORAL at 12:28

## 2023-02-09 ASSESSMENT — PAIN SCALES - GENERAL
PAINLEVEL_OUTOF10: 0
PAINLEVEL_OUTOF10: 0

## 2023-02-09 ASSESSMENT — PAIN SCALES - WONG BAKER: WONGBAKER_NUMERICALRESPONSE: 0

## 2023-02-09 NOTE — PROGRESS NOTES
Hospitalist Progress Note      PCP: Danielle Berger MD    Date of Admission: 2/8/2023    Hospital Course:     Impression:    79-year-old female medical history significant for anxiety depression asthma COPD IVDA morbid obesity hep B and hep C. Presents with worsening abdominal swelling found to have cirrhosis with ascites. A/P:    1. Decompensated cirrhosis with ascites: Possibly related to hepatitis infection versus drug use. GI service consulted pending paracentesis. Cell studies, culture etc.  Lasix Aldactone. 2.  Morbid obesity    3. Concern for urinary tract infection: Continue Rocephin. 4.  Hepatitis infection checking hepatitis studies outpatient EGD. CODE STATUS: Full code    DVT prophylaxis: Lovenox    Transition of care Home with home care    Anticipated date of discharge home possible 2 to 3 days    24-hour care plan: Antibiotics diuretics paracentesis albumin fluid studies.         Medications:  Reviewed    Infusion Medications    sodium chloride      sodium chloride       Scheduled Medications    potassium chloride  40 mEq Oral Once    sodium chloride flush  5-40 mL IntraVENous 2 times per day    amitriptyline  50 mg Oral Nightly    furosemide  40 mg Oral Daily    spironolactone  50 mg Oral Daily    venlafaxine  150 mg Oral Daily    cefTRIAXone (ROCEPHIN) IV  1,000 mg IntraVENous Q24H    sodium chloride flush  5-40 mL IntraVENous 2 times per day    enoxaparin  40 mg SubCUTAneous BID    gabapentin  400 mg Oral TID    busPIRone  5 mg Oral BID     PRN Meds: sodium chloride flush, sodium chloride, albuterol sulfate HFA, sodium chloride flush, sodium chloride, ondansetron **OR** [DISCONTINUED] ondansetron, polyethylene glycol    No intake or output data in the 24 hours ending 02/09/23 0939    Exam:    /79   Pulse (!) 101   Temp 97.9 °F (36.6 °C) (Oral)   Resp 18   Ht 5' 6\" (1.676 m)   Wt (!) 350 lb (158.8 kg)   SpO2 98%   BMI 56.49 kg/m²     General appearance: No apparent distress, appears stated age and cooperative. HEENT: Pupils equal, round, and reactive to light. Conjunctivae/corneas clear. Neck: Supple, with full range of motion. No jugular venous distention. Trachea midline. Respiratory:  Normal respiratory effort. Clear to auscultation, bilaterally without Rales/Wheezes/Rhonchi. Cardiovascular: Regular rate and rhythm with normal S1/S2 without murmurs, rubs or gallops. Abdomen: Soft, non-tender, non-distended with normal bowel sounds. Musculoskeletal: No clubbing, cyanosis or edema bilaterally. Full range of motion without deformity. Skin: Skin color, texture, turgor normal.  No rashes or lesions. Neurologic:  Neurovascularly intact without any focal sensory/motor deficits.  Cranial nerves: II-XII intact, grossly non-focal.  Psychiatric: Alert and oriented, thought content appropriate, normal insight  Capillary Refill: Brisk,< 3 seconds   Peripheral Pulses: +2 palpable, equal bilaterally       Labs:   Recent Labs     02/08/23  1217 02/09/23  0501   WBC 6.9 6.9   HGB 13.2 12.0   HCT 40.8 35.5*   * 111*     Recent Labs     02/08/23  1217 02/09/23  0501   * 137   K 3.3* 3.4*    103   CO2 26 29   BUN 5* 6*   CREATININE 0.7 0.7   CALCIUM 8.6 8.4     Recent Labs     02/08/23  1217 02/09/23  0501   AST 58* 55*   ALT 17 22   BILIDIR 0.5*  --    BILITOT 1.3* 1.1*   ALKPHOS 232* 189*     Recent Labs     02/08/23  1215   INR 1.39*     Recent Labs     02/08/23  1217   TROPONINI <0.01       Urinalysis:      Lab Results   Component Value Date/Time    NITRU Negative 02/08/2023 01:31 PM    WBCUA 55 02/08/2023 01:31 PM    BACTERIA 2+ 02/08/2023 01:31 PM    RBCUA 1 02/08/2023 01:31 PM    BLOODU LARGE 02/08/2023 01:31 PM    SPECGRAV 1.010 02/08/2023 01:31 PM    GLUCOSEU Negative 02/08/2023 01:31 PM       Radiology:  CT ABDOMEN PELVIS W IV CONTRAST Additional Contrast? None   Final Result   Advanced cirrhosis of the liver, with moderate to large amount of ascites,   moderate splenomegaly, and evidence of portal hypertension with portosystemic   collaterals. Mild distension of some proximal loop of small bowel. This may represent   mild obstruction or localized ileus and requires clinical correlation. Fat containing ventral hernia. Extensive subcutaneous fat around the abdomen   and pelvis in keeping with anasarca. Degenerative disc disease of the L1-L2 disc. XR CHEST PORTABLE   Final Result   1. No acute chest abnormality. 2. Right tibia and fibula demonstrate no acute fractures or dislocations. Moderate to severe right knee degenerative changes. Soft tissue edema. 3. Left tibia and fibula demonstrate no acute fractures or dislocations. Severe tricompartmental degenerative changes of the left knee. Soft tissue   edema. XR TIBIA FIBULA LEFT (2 VIEWS)   Final Result   1. No acute chest abnormality. 2. Right tibia and fibula demonstrate no acute fractures or dislocations. Moderate to severe right knee degenerative changes. Soft tissue edema. 3. Left tibia and fibula demonstrate no acute fractures or dislocations. Severe tricompartmental degenerative changes of the left knee. Soft tissue   edema. XR TIBIA FIBULA RIGHT (2 VIEWS)   Final Result   1. No acute chest abnormality. 2. Right tibia and fibula demonstrate no acute fractures or dislocations. Moderate to severe right knee degenerative changes. Soft tissue edema. 3. Left tibia and fibula demonstrate no acute fractures or dislocations. Severe tricompartmental degenerative changes of the left knee. Soft tissue   edema.                  Assessment/Plan:    Active Hospital Problems    Diagnosis Date Noted    Cirrhosis [facility]) [F81.27] 02/08/2023     Priority: [address], DO

## 2023-02-09 NOTE — PROGRESS NOTES
02/09/23 0021   RT Protocol   History Pulmonary Disease 2   Respiratory pattern 0   Breath sounds 0   Cough 0   Indications for Bronchodilator Therapy On home bronchodilators   Bronchodilator Assessment Score 2

## 2023-02-09 NOTE — ED PROVIDER NOTES
In addition to the advanced practice provider, I personally saw Evie Card and performed a substantive portion of the visit including all aspects of the medical decision making. Briefly, this is a 51 y.o. female here for lower abdominal swelling and leg edema. Associated generalized weakness and fatigue. Patient states her symptoms have been gradually worsening over the past week or so, she is now having difficulty taking care of herself at home due to the weakness and swelling. She has history of liver disease, hepatitis B and C..    On exam, patient afebrile, chronically ill-appearing however nontoxic. No distress. Heart RRR. Lungs CTAB. Abdomen softly distended, tender to palpation in epigastrium and right upper quadrant. There is edema of the lower abdominal wall without tenderness to deep palpation. Negative Rovsing. No CVA tenderness. No rebound, no rigidity, no guarding. 3+ pitting symmetric lower extremity edema with venous stasis skin changes. 5 out of 5 motor and sensation grossly intact bilateral lower extremities. EKG  EKG was reviewed by emergency department physician in the absence of a cardiologist    Narrow complex sinus rhythm, rate 93, normal axis, normal ND and QRS intervals, prolonged Qtc, no ST elevations or depressions, normal t-wave morphology, impression NSR, no STEMI      Screenings   [place] Coma Scale  Eye Opening: Spontaneous  Best Verbal Response: Oriented  Best Motor Response: Obeys commands  [place] Coma Scale Score: 15      Is this patient to be included in the SEP-1 Core Measure due to severe sepsis or septic shock? No   Exclusion criteria - the patient is NOT to be included for SEP-1 Core Measure due to:  May have criteria for sepsis, but does not meet criteria for severe sepsis or septic shock      MDM    Patient afebrile, chronically ill-appearing however nontoxic. She is in no painful or respiratory distress. She is alert and protecting her airway.   No peritoneal signs on abdominal exam.  No focal tenderness to suggest acute appendicitis and my suspicion is very low. CT imaging was pursued which shows advanced cirrhotic changes of the liver with a large amount of ascites. There is some evidence of small bowel dilation, patient does report passing flatus and having bowel movements and clinically my suspicion for acute bowel obstruction is low. No evidence of bowel perforation, intra-abdominal abscess or other acute infectious etiology. Laboratory work-up is without evidence of leukocytosis, procalcitonin normal, I have low suspicion for spontaneous bacterial peritonitis. UA does appear with potential infection and will treat with cefepime. Patient is not septic. Patient is alert, fully oriented and conversant, no clinical evidence of hepatic encephalopathy. Suspect anasarca and weakness secondary to decompensated liver cirrhosis. Patient not felt safe for discharge to self-care in her current condition given her inability to complete her ADLs. Case discussed with internal medicine team and will plan for admission for further evaluation and care. Patient alert, hemodynamically stable and in no distress at time of admission. I Dr. Elizabet Morelos am the primary clinician of record. Patient Referrals:  No follow-up provider specified. Discharge Medications:  Current Discharge Medication List          FINAL IMPRESSION  1. Acute cystitis with hematuria    2. Cirrhosis of liver with ascites, unspecified hepatic cirrhosis type ([address]    3. Anasarca    4. Hypoalbuminemia    5. Elevated lactic acid level    6. Generalized abdominal pain    7. Breakdown of skin tissue    8. Splenomegaly        Blood pressure 121/79, pulse (!) 101, temperature 97.9 °F (36.6 °C), temperature source Oral, resp. rate 18, height 5' 6\" (1.676 m), weight (!) 350 lb (158.8 kg), SpO2 98 %, not currently breastfeeding.      For further details of [address] [facility] [address] emergency department encounter, please see documentation by advanced practice provider, JESSI Sequeira.     Yolanda Ríos DO (electronically signed)  Attending Emergency Physician       Yolanda Ríos DO  02/09/23 1000

## 2023-02-09 NOTE — PROGRESS NOTES
Shift assessment completed. Routine vitals stable. Scheduled medications given. Patient is awake, alert and oriented. Respirations are easy and unlabored. Patient does not appear to be in distress. Call light within reach. Dr Dahl Peak in to see patient and explained her dx and outcomes. Patient upset but understands with the doctor was saying. Paracentesis will be completed today, medications on hold and patient NPO at this time.

## 2023-02-09 NOTE — PROGRESS NOTES
Patient has arrived to  in stable condition. Vitals obtained. Patient is awake, alert and oriented. Patient is currently sitting up in bed, dinner in front of her. Plan of care discussed with patient, patient agreeable. Call light within reach. Bed alarm engaged. Bed locked and in low position. Fall precautions in place. No further needs expressed.

## 2023-02-09 NOTE — CARE COORDINATION
Discharge Planning Note:    Patinient lives with two other people but she states they are not much help to her. She stated she has been weak lately. She does have HEP B & C and cirrhosis. States she has not  used heroin since October. CM will cont to monitor.      Electronically signed by Latoya Canales RN on 2/9/2023 at 9:10 AM

## 2023-02-09 NOTE — PLAN OF CARE
Problem: Pain  Goal: Verbalizes/displays adequate comfort level or baseline comfort level  Outcome: Progressing  Flowsheets (Taken 2/9/2023 0103)  Verbalizes/displays adequate comfort level or baseline comfort level:   Assess pain using appropriate pain scale   Encourage patient to monitor pain and request assistance     Problem: ABCDS Injury Assessment  Goal: Absence of physical injury  Outcome: Progressing  Flowsheets (Taken 2/9/2023 0103)  Absence of Physical Injury: Implement safety measures based on patient assessment     Problem: Skin/Tissue Integrity  Goal: Absence of new skin breakdown  Description: 1. Monitor for areas of redness and/or skin breakdown  2. Assess vascular access sites hourly  3. Every 4-6 hours minimum:  Change oxygen saturation probe site  4. Every 4-6 hours:  If on nasal continuous positive airway pressure, respiratory therapy assess nares and determine need for appliance change or resting period.   Outcome: Progressing     Problem: Respiratory - Adult  Goal: Achieves optimal ventilation and oxygenation  Outcome: Progressing  Flowsheets (Taken 2/9/2023 0103)  Achieves optimal ventilation and oxygenation: Assess for changes in respiratory status     Problem: Skin/Tissue Integrity - Adult  Goal: Incisions, wounds, or drain sites healing without S/S of infection  Outcome: Progressing  Flowsheets (Taken 2/9/2023 0103)  Incisions, Wounds, or Drain Sites Healing Without Sign and Symptoms of Infection: TWICE DAILY: Assess and document skin integrity     Problem: Musculoskeletal - Adult  Goal: Return mobility to safest level of function  Outcome: Progressing  Flowsheets (Taken 2/9/2023 0103)  Return Mobility to Safest Level of Function: Assess patient stability and activity tolerance for standing, transferring and ambulating with or without assistive devices     Problem: Metabolic/Fluid and Electrolytes - Adult  Goal: Hemodynamic stability and optimal renal function maintained  Outcome: Progressing  Flowsheets (Taken 2/9/2023 0103)  Hemodynamic stability and optimal renal function maintained: Monitor intake, output and patient weight

## 2023-02-09 NOTE — RT PROTOCOL NOTE
RT Inhaler-Nebulizer Bronchodilator Protocol Note    There is a bronchodilator order in the chart from a provider indicating to follow the RT Bronchodilator Protocol and there is an Initiate RT Inhaler-Nebulizer Bronchodilator Protocol order as well (see protocol at bottom of note). CXR Findings:  XR CHEST PORTABLE    Result Date: 2/8/2023  1. No acute chest abnormality. 2. Right tibia and fibula demonstrate no acute fractures or dislocations. Moderate to severe right knee degenerative changes. Soft tissue edema. 3. Left tibia and fibula demonstrate no acute fractures or dislocations. Severe tricompartmental degenerative changes of the left knee. Soft tissue edema. The findings from the last RT Protocol Assessment were as follows:   History Pulmonary Disease: Chronic pulmonary disease  Respiratory Pattern: Regular pattern and RR 12-20 bpm  Breath Sounds: Clear breath sounds  Cough: Strong, spontaneous, non-productive  Indication for Bronchodilator Therapy: On home bronchodilators  Bronchodilator Assessment Score: 2    Aerosolized bronchodilator medication orders have been revised according to the RT Inhaler-Nebulizer Bronchodilator Protocol below. Respiratory Therapist to perform RT Therapy Protocol Assessment initially then follow the protocol. Repeat RT Therapy Protocol Assessment PRN for score 0-3 or on second treatment, BID, and PRN for scores above 3. No Indications - adjust the frequency to every 6 hours PRN wheezing or bronchospasm, if no treatments needed after 48 hours then discontinue using Per Protocol order mode. If indication present, adjust the RT bronchodilator orders based on the Bronchodilator Assessment Score as indicated below.   Use Inhaler orders unless patient has one or more of the following: on home nebulizer, not able to hold breath for 10 seconds, is not alert and oriented, cannot activate and use MDI correctly, or respiratory rate 25 breaths per minute or more, then use the equivalent nebulizer order(s) with same Frequency and PRN reasons based on the score. If a patient is on this medication at home then do not decrease Frequency below that used at home. 0-3 - enter or revise RT bronchodilator order(s) to equivalent RT Bronchodilator order with Frequency of every 4 hours PRN for wheezing or increased work of breathing using Per Protocol order mode. 4-6 - enter or revise RT Bronchodilator order(s) to two equivalent RT bronchodilator orders with one order with BID Frequency and one order with Frequency of every 4 hours PRN wheezing or increased work of breathing using Per Protocol order mode. 7-10 - enter or revise RT Bronchodilator order(s) to two equivalent RT bronchodilator orders with one order with TID Frequency and one order with Frequency of every 4 hours PRN wheezing or increased work of breathing using Per Protocol order mode. 11-13 - enter or revise RT Bronchodilator order(s) to one equivalent RT bronchodilator order with QID Frequency and an Albuterol order with Frequency of every 4 hours PRN wheezing or increased work of breathing using Per Protocol order mode. Greater than 13 - enter or revise RT Bronchodilator order(s) to one equivalent RT bronchodilator order with every 4 hours Frequency and an Albuterol order with Frequency of every 2 hours PRN wheezing or increased work of breathing using Per Protocol order mode. RT to enter RT Home Evaluation for COPD & MDI Assessment order using Per Protocol order mode.     Electronically signed by DAWOOD PLEITEZ RCP on 2/9/2023 at 12:21 AM

## 2023-02-09 NOTE — BRIEF OP NOTE
Brief Postoperative Note    Wilfredo Mooney  YOB: 1975  [id number]    Pre-operative Diagnosis: Ascites    Post-operative Diagnosis: Same    Procedure: US Guided Paracentesis    Anesthesia: Local    Surgeons/Assistants: Tao Kaur MD    Estimated Blood Loss: less than 5 mL    Complications: None    Specimens: Was Obtained: serous Ascitic Fluid    Findings: Technically successful US guided paracentesis.     Electronically signed by Tao Kaur MD on 2/9/2023 at 3:38 PM

## 2023-02-09 NOTE — PROGRESS NOTES
Brief Nutrition Education    Pt triggered for diet education consult on low sodium diet r/t cirrhosis per GI. Pt reported she has been told to limit sodium intake in the past, has significantly cut back on the amount of salt she adds to food but still occasionally uses the salt shaker. Education included low sodium diet guidelines (2gm Na+/day). Reviewed foods to choose and foods to avoid, along with label reading and ways to add flavor to food. Pt states understanding of education. Left handout with name and phone number should pt have any further questions regarding material. Time spent with patient: 10 minutes. Educated on 2/9  Learners: Patient  Readiness: Acceptance  Method: Explanation and Handout  Response: Verbalizes Understanding  Contact name and number provided.     Marilu Villatoro, MS, RD, LD  Contact Number: [phone]

## 2023-02-09 NOTE — PROGRESS NOTES
3300ml of fluid removed  Spoke to patients RN carly and advised her the amount of fluid removed and that patient was returning to the floor.

## 2023-02-09 NOTE — FLOWSHEET NOTE
4 Eyes Skin Assessment     NAME:  Sergo Rosario  YOB: 1975  MEDICAL RECORD NUMBER:  [id number]    The patient is being assessed for  Admission    I agree that One RN has performed a thorough Head to Toe Skin Assessment on the patient. ALL assessment sites listed below have been assessed. Areas assessed by both nurses:    Head, Face, Ears, Shoulders, Back, Chest, Arms, Elbows, Hands, Sacrum. Buttock, Coccyx, Ischium, and Legs. Feet and Heels        Does the Patient have a Wound? Yes wound(s) were present on assessment.  LDA wound assessment was Initiated and completed by RN       Sean Prevention initiated by RN: Yes   Wound Care Orders initiated by RN: NA    Pressure Injury (Stage 3,4, Unstageable, DTI, NWPT, and Complex wounds) if present, place referral order by RN under : NA    New and Established Ostomies, if present place, referral order under : NA      Nurse 1 eSignature: Electronically signed by Paz Diehl RN on 2/8/23 at 10:39 PM EST    **SHARE this note so that the co-signing nurse can place an eSignature**    Nurse 2 eSignature: Electronically signed by Yovany Ibarra RN on 2/8/23 at 11:09 PM EST

## 2023-02-09 NOTE — CONSULTS
Gastroenterology Consult Note        Patient: Gena Carl  : 1975  Acct#:      Date:  2023    Subjective:       History of Present Illness  Patient is a 51 y.o.  female admitted with Splenomegaly [R16.1]  Anasarca [R60.1]  Cirrhosis ([facility] [K74.60]  Hypoalbuminemia [E88.09]  Generalized abdominal pain [R10.84]  Breakdown of skin tissue [R23.8]  Acute cystitis with hematuria [N30.01]  Elevated lactic acid level [R79.89]  Cirrhosis of liver with ascites, unspecified hepatic cirrhosis type ([facility] [K74.60, R18.8] who is seen in consult for cirrhosis. She was seen inpatient in 2019 for acute hepatitis B. HCV AB was positive then but RNA negative c/w clearance of Hep C.  saw Dr. Soledad Sanches 2022 as a new patient for this and elevated liver enzymes last year but has not followed up since. Did not get blood work done that he ordered. Labs from 2022 with negative HCV RNA. Labs 2022 with HBV DNA not detected. Denies history of alcohol abuse. Last use IV drugs in 2022. She had been off drugs for several years prior to that. She presented to the ER for progressive abdominal distention. CT with cirrhotic liver and moderate to large ascites. No h/o ascites before. Denies nausea, vomiting, melena, hematochezia. She is on Lasix 40 mg daily at home. Past Medical History:   Diagnosis Date    Anxiety     Arthritis     Asthma     COPD (chronic obstructive pulmonary disease) ([facility] 6/3/2022    Depression     lost 2 children, apx 2012. RSD lower limb     right foot      Past Surgical History:   Procedure Laterality Date    ANKLE FRACTURE SURGERY      CARPAL TUNNEL RELEASE       SECTION      CHOLECYSTECTOMY      KNEE ARTHROSCOPY Bilateral     OVARY REMOVAL Right     TOTAL ANKLE ARTHROPLASTY      TUBAL LIGATION        Past Endoscopic History: None. Admission Meds  No current facility-administered medications on file prior to encounter.      Current Outpatient Medications on File Prior to Encounter   Medication Sig Dispense Refill    gabapentin (NEURONTIN) 400 MG capsule Take 400 mg by mouth 3 times daily. spironolactone (ALDACTONE) 50 MG tablet Take 1 tablet by mouth daily (Patient not taking: Reported on 2/8/2023) 30 tablet 1    furosemide (LASIX) 40 MG tablet Take 1 tablet by mouth daily 30 tablet 0    albuterol sulfate HFA (VENTOLIN HFA) 108 (90 Base) MCG/ACT inhaler Inhale 2 puffs into the lungs 4 times daily as needed for Wheezing 54 g 1    gabapentin (NEURONTIN) 400 MG capsule TAKE ONE CAPSULE BY MOUTH THREE TIMES A DAY 90 capsule 0    amitriptyline (ELAVIL) 50 MG tablet Take 1 tablet by mouth nightly (Patient not taking: Reported on 2/8/2023) 90 tablet 1    amLODIPine (NORVASC) 2.5 MG tablet Take 1 tablet by mouth daily (Patient not taking: Reported on 2/8/2023) 90 tablet 1    Compression Stockings MISC by Does not apply route 1 each 0    busPIRone (BUSPAR) 5 MG tablet Take 1 tablet by mouth 2 times daily 60 tablet 0    venlafaxine (EFFEXOR XR) 150 MG extended release capsule Take 1 capsule by mouth daily (Patient not taking: Reported on 2/8/2023) 30 capsule 2    umeclidinium-vilanterol (ANORO ELLIPTA) 62.5-25 MCG/INH AEPB inhaler Inhale 1 puff into the lungs daily 1 each 2    diclofenac (VOLTAREN) 50 MG EC tablet Take 1 tablet by mouth 3 times daily (with meals) (Patient not taking: Reported on 2/8/2023) 60 tablet 3       Patient denies ASA, NSAID use. States she is only on Lasix, gabapentin, Effexor at home.     Allergies  Allergies   Allergen Reactions    Darvocet A500 [Propoxyphene N-Acetaminophen] Hives    Penicillins Hives      Social   Social History     Tobacco Use    Smoking status: Every Day     Packs/day: 0.50     Years: 35.00     Pack years: 17.50     Types: Cigarettes     Start date: 25    Smokeless tobacco: Never   Substance Use Topics    Alcohol use: No     Comment: 1x a year maybe         Family History   Problem Relation Age of Onset    Depression Mother     Anxiety Disorder Mother     Arthritis Other     Asthma Other     Cancer Other     Diabetes Other     High Blood Pressure Other     High Blood Pressure Sister     Depression Sister     Anxiety Disorder Sister     Heart Disease Maternal Grandfather                  Physical Exam  Blood pressure 121/79, pulse (!) 101, temperature 97.9 °F (36.6 °C), temperature source Oral, resp. rate 18, height 5' 6\" (1.676 m), weight (!) 350 lb (158.8 kg), SpO2 98 %, not currently breastfeeding. General appearance: alert, cooperative, no distress, appears stated age  Eyes: Anicteric  Head: Normocephalic, without obvious abnormality  Lungs: clear to auscultation bilaterally, Normal Effort  Heart: regular rate and rhythm, normal S1 and S2, no murmurs or rubs  Abdomen: distended, diffuse tenderness. No rebound or guarding. . Bowel sounds normal. No masses,  no organomegaly. Extremities: atraumatic, no cyanosis. Anasarca  Skin: warm and dry, no jaundice  Neuro: Grossly intact, A&OX3  Musculoskeletal: 5/5  strength BUE      Data Review:    Recent Labs     02/08/23  1217 02/09/23  0501   WBC 6.9 6.9   HGB 13.2 12.0   HCT 40.8 35.5*   .3* 99.0   * 111*     Recent Labs     02/08/23  1217 02/09/23  0501   * 137   K 3.3* 3.4*    103   CO2 26 29   BUN 5* 6*   CREATININE 0.7 0.7     Recent Labs     02/08/23  1217 02/09/23  0501   AST 58* 55*   ALT 17 22   BILIDIR 0.5*  --    BILITOT 1.3* 1.1*   ALKPHOS 232* 189*     Recent Labs     02/08/23  1217   LIPASE 17.0     Recent Labs     02/08/23  1215   PROTIME 17.0*   INR 1.39*     No results for input(s): PTT in the last 72 hours. No results for input(s): OCCULTBLD in the last 72 hours.     Imaging Studies:               CT-scan of abdomen and pelvis w iv contrast 2/8/23:  Impression   Advanced cirrhosis of the liver, with moderate to large amount of ascites,   moderate splenomegaly, and evidence of portal hypertension with portosystemic   collaterals. Mild distension of some proximal loop of small bowel. This may represent   mild obstruction or localized ileus and requires clinical correlation. Fat containing ventral hernia. Extensive subcutaneous fat around the abdomen   and pelvis in keeping with anasarca. Degenerative disc disease of the L1-L2 disc. Assessment/Plan:     Ascites -no prior paracentesis. CT with moderate to large ascites. Paracentesis with cell count, culture, albumin, protein, cytology  Give albumin at time of paracentesis  2 g low-sodium diet with dietary consult for education  Daily weights  On Lasix 40 mg daily. Increase Aldactone to 100 mg daily. Cirrhosis - h/o HCV and HBV but labs from 2022 with neg HCV RNA and neg HBV DNA. Saw Dr. Calixto Milan once last year for elevated liver enzymes but then did not f/u thereafter and did not get blood work done. No history of hepatic encephalopathy. No prior EGD for variceal screening. Recommend outpatient screening EGD  Check HBV - core AB, surface AB, surface AG  HCV RNA  LEIGH, f-actin, AMA, AFP, ferritin, A1AT, ceruloplasmin, Hepa total AB       Discussed with Dr. Augustina Toure, ADAN  [address] [place] [address]  I have personally performed a face to face diagnostic evaluation on this patient. I have interviewed and examined the patient and I agree with the findings and recommended plan of care. In summary, my findings and plan are the following: As above, 50 yo woman with cirrhosis due to HBV vs HCV vs HOUSER. She is clean x 2 yrs from IVDA. She has cellulitis, anasarca and progressive ascites. Will plan dx/Rx paracentesis. Will recheck viral serologies for vaccination purposes and to see if antiviral Rx needed. She will need screening EGD and q6 month labs and RUQ US. For now will restart diuretics and stress importance of f/u with Dr. Calixto Milan, regularly.     Austin Luna, [address] [place] [address] Road  2/10/2023

## 2023-02-09 NOTE — PLAN OF CARE
Problem: Pain  Goal: Verbalizes/displays adequate comfort level or baseline comfort level  2/9/2023 1029 by Andrew Almaraz RN  Outcome: Progressing  Flowsheets (Taken 2/9/2023 0900)  Verbalizes/displays adequate comfort level or baseline comfort level: Assess pain using appropriate pain scale  2/9/2023 0103 by Eddie Kraus RN  Outcome: Progressing  Flowsheets (Taken 2/9/2023 0103)  Verbalizes/displays adequate comfort level or baseline comfort level:   Assess pain using appropriate pain scale   Encourage patient to monitor pain and request assistance     Problem: Safety - Adult  Goal: Free from fall injury  Outcome: Progressing     Problem: ABCDS Injury Assessment  Goal: Absence of physical injury  2/9/2023 1029 by Andrew Almaraz RN  Outcome: Progressing  2/9/2023 0103 by Eddie Kraus RN  Outcome: Progressing  Flowsheets (Taken 2/9/2023 0103)  Absence of Physical Injury: Implement safety measures based on patient assessment     Problem: Skin/Tissue Integrity  Goal: Absence of new skin breakdown  Description: 1. Monitor for areas of redness and/or skin breakdown  2. Assess vascular access sites hourly  3. Every 4-6 hours minimum:  Change oxygen saturation probe site  4. Every 4-6 hours:  If on nasal continuous positive airway pressure, respiratory therapy assess nares and determine need for appliance change or resting period.   2/9/2023 1029 by Andrew Almaraz RN  Outcome: Progressing  2/9/2023 0103 by Eddie Kraus RN  Outcome: Progressing     Problem: Respiratory - Adult  Goal: Achieves optimal ventilation and oxygenation  2/9/2023 1029 by Andrew Almaraz RN  Outcome: Progressing  Flowsheets (Taken 2/9/2023 0913)  Achieves optimal ventilation and oxygenation: Assess for changes in respiratory status  2/9/2023 0103 by Eddie Kraus RN  Outcome: Progressing  Flowsheets (Taken 2/9/2023 0103)  Achieves optimal ventilation and oxygenation: Assess for changes in respiratory status     Problem: Skin/Tissue Integrity - Adult  Goal: Incisions, wounds, or drain sites healing without S/S of infection  2/9/2023 1029 by Sandra Joshi RN  Outcome: Progressing  Flowsheets (Taken 2/9/2023 0913)  Incisions, Wounds, or Drain Sites Healing Without Sign and Symptoms of Infection: TWICE DAILY: Assess and document skin integrity  2/9/2023 0103 by Krystal Sinha RN  Outcome: Progressing  Flowsheets (Taken 2/9/2023 0103)  Incisions, Wounds, or Drain Sites Healing Without Sign and Symptoms of Infection: TWICE DAILY: Assess and document skin integrity     Problem: Musculoskeletal - Adult  Goal: Return mobility to safest level of function  2/9/2023 1029 by Sanrda Joshi RN  Outcome: Progressing  Flowsheets (Taken 2/9/2023 0913)  Return Mobility to Safest Level of Function: Assess patient stability and activity tolerance for standing, transferring and ambulating with or without assistive devices  2/9/2023 0103 by Krystal Sinha RN  Outcome: Progressing  Flowsheets (Taken 2/9/2023 0103)  Return Mobility to Safest Level of Function: Assess patient stability and activity tolerance for standing, transferring and ambulating with or without assistive devices     Problem: Metabolic/Fluid and Electrolytes - Adult  Goal: Hemodynamic stability and optimal renal function maintained  2/9/2023 1029 by Sandra Joshi RN  Outcome: Progressing  Flowsheets (Taken 2/9/2023 0913)  Hemodynamic stability and optimal renal function maintained:   Monitor labs and assess for signs and symptoms of volume excess or deficit   Monitor intake, output and patient weight  2/9/2023 0103 by Krystal Sinha RN  Outcome: Progressing  Flowsheets (Taken 2/9/2023 0103)  Hemodynamic stability and optimal renal function maintained: Monitor intake, output and patient weight

## 2023-02-10 VITALS
HEART RATE: 95 BPM | WEIGHT: 293 LBS | DIASTOLIC BLOOD PRESSURE: 80 MMHG | OXYGEN SATURATION: 99 % | TEMPERATURE: 98.3 F | BODY MASS INDEX: 47.09 KG/M2 | RESPIRATION RATE: 19 BRPM | HEIGHT: 66 IN | SYSTOLIC BLOOD PRESSURE: 124 MMHG

## 2023-02-10 LAB
A/G RATIO: 0.6 (ref 1.1–2.2)
ALBUMIN SERPL-MCNC: 2.4 G/DL (ref 3.4–5)
ALP BLD-CCNC: 168 U/L (ref 40–129)
ALT SERPL-CCNC: 20 U/L (ref 10–40)
ANION GAP SERPL CALCULATED.3IONS-SCNC: 7 MMOL/L (ref 3–16)
ANTI-NUCLEAR ANTIBODY (ANA): NEGATIVE
AST SERPL-CCNC: 53 U/L (ref 15–37)
BASOPHILS ABSOLUTE: 0.1 K/UL (ref 0–0.2)
BASOPHILS RELATIVE PERCENT: 1.2 %
BILIRUB SERPL-MCNC: 1.1 MG/DL (ref 0–1)
BUN BLDV-MCNC: 6 MG/DL (ref 7–20)
CALCIUM SERPL-MCNC: 8.1 MG/DL (ref 8.3–10.6)
CHLORIDE BLD-SCNC: 103 MMOL/L (ref 99–110)
CO2: 27 MMOL/L (ref 21–32)
CREAT SERPL-MCNC: 0.6 MG/DL (ref 0.6–1.1)
EOSINOPHILS ABSOLUTE: 0.5 K/UL (ref 0–0.6)
EOSINOPHILS RELATIVE PERCENT: 7.2 %
GFR SERPL CREATININE-BSD FRML MDRD: >60 ML/MIN/{1.73_M2}
GLUCOSE BLD-MCNC: 100 MG/DL (ref 70–99)
HBV SURFACE AB TITR SER: <3.5 MIU/ML
HCT VFR BLD CALC: 35.1 % (ref 36–48)
HEMOGLOBIN: 11.8 G/DL (ref 12–16)
HEPATITIS B SURFACE ANTIGEN INTERPRETATION: REACTIVE
LYMPHOCYTES ABSOLUTE: 2.9 K/UL (ref 1–5.1)
LYMPHOCYTES RELATIVE PERCENT: 43.1 %
MCH RBC QN AUTO: 33.7 PG (ref 26–34)
MCHC RBC AUTO-ENTMCNC: 33.5 G/DL (ref 31–36)
MCV RBC AUTO: 100.4 FL (ref 80–100)
MONOCYTES ABSOLUTE: 0.8 K/UL (ref 0–1.3)
MONOCYTES RELATIVE PERCENT: 11 %
NEUTROPHILS ABSOLUTE: 2.6 K/UL (ref 1.7–7.7)
NEUTROPHILS RELATIVE PERCENT: 37.5 %
PDW BLD-RTO: 17.2 % (ref 12.4–15.4)
PLATELET # BLD: 125 K/UL (ref 135–450)
PMV BLD AUTO: 8.8 FL (ref 5–10.5)
POTASSIUM REFLEX MAGNESIUM: 4.1 MMOL/L (ref 3.5–5.1)
RBC # BLD: 3.5 M/UL (ref 4–5.2)
SODIUM BLD-SCNC: 137 MMOL/L (ref 136–145)
TOTAL PROTEIN: 6.6 G/DL (ref 6.4–8.2)
WBC # BLD: 6.8 K/UL (ref 4–11)

## 2023-02-10 PROCEDURE — 97535 SELF CARE MNGMENT TRAINING: CPT

## 2023-02-10 PROCEDURE — 6370000000 HC RX 637 (ALT 250 FOR IP): Performed by: PHYSICIAN ASSISTANT

## 2023-02-10 PROCEDURE — 6360000002 HC RX W HCPCS: Performed by: INTERNAL MEDICINE

## 2023-02-10 PROCEDURE — 97161 PT EVAL LOW COMPLEX 20 MIN: CPT

## 2023-02-10 PROCEDURE — 6360000002 HC RX W HCPCS: Performed by: FAMILY MEDICINE

## 2023-02-10 PROCEDURE — 97116 GAIT TRAINING THERAPY: CPT

## 2023-02-10 PROCEDURE — 97166 OT EVAL MOD COMPLEX 45 MIN: CPT

## 2023-02-10 PROCEDURE — 36415 COLL VENOUS BLD VENIPUNCTURE: CPT

## 2023-02-10 PROCEDURE — 80053 COMPREHEN METABOLIC PANEL: CPT

## 2023-02-10 PROCEDURE — 94760 N-INVAS EAR/PLS OXIMETRY 1: CPT

## 2023-02-10 PROCEDURE — 85025 COMPLETE CBC W/AUTO DIFF WBC: CPT

## 2023-02-10 PROCEDURE — 6370000000 HC RX 637 (ALT 250 FOR IP): Performed by: INTERNAL MEDICINE

## 2023-02-10 PROCEDURE — 2580000003 HC RX 258: Performed by: INTERNAL MEDICINE

## 2023-02-10 PROCEDURE — 97530 THERAPEUTIC ACTIVITIES: CPT

## 2023-02-10 RX ORDER — SPIRONOLACTONE 100 MG/1
100 TABLET, FILM COATED ORAL DAILY
Qty: 30 TABLET | Refills: 3 | Status: SHIPPED | OUTPATIENT
Start: 2023-02-11

## 2023-02-10 RX ORDER — CEFDINIR 300 MG/1
300 CAPSULE ORAL 2 TIMES DAILY
Qty: 20 CAPSULE | Refills: 0 | Status: SHIPPED | OUTPATIENT
Start: 2023-02-10 | End: 2023-02-20

## 2023-02-10 RX ORDER — FUROSEMIDE 40 MG/1
40 TABLET ORAL DAILY
Qty: 30 TABLET | Refills: 0 | Status: SHIPPED | OUTPATIENT
Start: 2023-02-10

## 2023-02-10 RX ADMIN — Medication 10 ML: at 10:45

## 2023-02-10 RX ADMIN — GABAPENTIN 400 MG: 400 CAPSULE ORAL at 10:43

## 2023-02-10 RX ADMIN — MORPHINE SULFATE 2 MG: 2 INJECTION, SOLUTION INTRAMUSCULAR; INTRAVENOUS at 05:58

## 2023-02-10 RX ADMIN — GABAPENTIN 400 MG: 400 CAPSULE ORAL at 16:43

## 2023-02-10 RX ADMIN — MORPHINE SULFATE 4 MG: 4 INJECTION, SOLUTION INTRAMUSCULAR; INTRAVENOUS at 10:44

## 2023-02-10 RX ADMIN — MORPHINE SULFATE 2 MG: 2 INJECTION, SOLUTION INTRAMUSCULAR; INTRAVENOUS at 16:45

## 2023-02-10 RX ADMIN — ENOXAPARIN SODIUM 40 MG: 100 INJECTION SUBCUTANEOUS at 10:49

## 2023-02-10 RX ADMIN — FUROSEMIDE 40 MG: 40 TABLET ORAL at 10:43

## 2023-02-10 RX ADMIN — SPIRONOLACTONE 100 MG: 25 TABLET ORAL at 10:43

## 2023-02-10 RX ADMIN — MORPHINE SULFATE 4 MG: 4 INJECTION, SOLUTION INTRAMUSCULAR; INTRAVENOUS at 01:04

## 2023-02-10 RX ADMIN — BUSPIRONE HYDROCHLORIDE 5 MG: 5 TABLET ORAL at 10:47

## 2023-02-10 ASSESSMENT — PAIN DESCRIPTION - DESCRIPTORS
DESCRIPTORS: DISCOMFORT
DESCRIPTORS: DISCOMFORT
DESCRIPTORS: ACHING;DISCOMFORT;SORE
DESCRIPTORS: DISCOMFORT
DESCRIPTORS: ACHING

## 2023-02-10 ASSESSMENT — PAIN DESCRIPTION - LOCATION
LOCATION: ABDOMEN
LOCATION: ABDOMEN;HIP;KNEE
LOCATION: GENERALIZED
LOCATION: ABDOMEN
LOCATION: GENERALIZED

## 2023-02-10 ASSESSMENT — PAIN SCALES - GENERAL
PAINLEVEL_OUTOF10: 7
PAINLEVEL_OUTOF10: 7
PAINLEVEL_OUTOF10: 5
PAINLEVEL_OUTOF10: 7
PAINLEVEL_OUTOF10: 3

## 2023-02-10 ASSESSMENT — PAIN - FUNCTIONAL ASSESSMENT: PAIN_FUNCTIONAL_ASSESSMENT: PREVENTS OR INTERFERES SOME ACTIVE ACTIVITIES AND ADLS

## 2023-02-10 ASSESSMENT — PAIN DESCRIPTION - ORIENTATION: ORIENTATION: MID

## 2023-02-10 NOTE — PROGRESS NOTES
[address] [facility],6[address] Msb Department   Phone: [phone]    Occupational Therapy    [x] Initial Evaluation            [] Daily Treatment Note         [] Discharge Summary      Patient: Sona Green   : 1975   MRN: [id number]   Date of Service:  2/10/2023    Admitting Diagnosis:  Cirrhosis [facility])  Current Admission Summary: Sona Green is a 51 y.o. female who presents to the emergency department with primary complaint of abdominal pain and bloating. States onset 6 weeks ago. States incarcerated at that time when it began. States she was in [facility] due to a 4year-old warrant having had Seroquel in her purse. Since being released the patient continues with abdominal pain and bloating which has progressed. She reports no nausea, vomiting, diarrhea or constipation. She does report prior abdominal surgeries including cholecystectomy, D&C, abdominal wall hernia repair 16 years ago and right ovary removal.     Patient also reports seeing urinary incontinence since which has been ongoing for the past several weeks. She reports no dysuria associated. The patient reporting bilateral lower extremity edema a bit more than her baseline and currently on Lasix 40 mg. The patient indicating open wound posterior left knee. We will need to evaluate when she is moved to a bed. Patient expresses concern regarding cellulitis bilateral extremities. Patient does live in a residence with 2 other individuals and states they are not much help. She is concerned about living independently at this time. Nursing Notes were all reviewed and agreed with or any disagreements were addressed in the HPI. Past Medical History:  has a past medical history of Anxiety, Arthritis, Asthma, COPD (chronic obstructive pulmonary disease) ([address] Depression, and RSD lower limb.   Past Surgical History:  has a past surgical history that includes  section; Ovary removal (Right); Carpal tunnel release; Tubal ligation (1994); Total ankle arthroplasty; Ankle fracture surgery; Knee arthroscopy (Bilateral); and Cholecystectomy. Discharge Recommendations: Evie Card scored a 18/24 on the AM-PAC ADL Inpatient form. Current research shows that an AM-PAC score of 18 or greater is typically associated with a discharge to the patient's home setting. Based on the patient's AM-PAC score, and their current ADL deficits, it is recommended that the patient have 2-3 sessions per week of Occupational Therapy at d/c to increase the patient's independence. At this time, this patient demonstrates the endurance and safety to discharge home with home (home vs OP services) and a follow up treatment frequency of 2-3x/wk. Please see assessment section for further patient specific details. HOME HEALTH CARE: LEVEL 4 SICK     - Initial home health evaluation to occur within 24-48 hours, in patient home   - Therapy evaluations in home within 24-48 hours of discharge; including DME and home safety   - Frontload therapy 5 days, then 3x a week   - Therapy to evaluate if patient has [address] needs for personal care   -  evaluation within 24-48 hours, includes evaluation of resources and insurance to determine [place], [place], LTC, and Medicaid options     If patient discharges prior to next session this note will serve as a discharge summary. Please see below for the latest assessment towards goals.       DME Required For Discharge: hand-held shower head, reacher, sock-aid, long-handled shoe horn, long-handle sponge, bariatric tub transfer bench, extra wide sock aide    Precautions/Restrictions: high fall risk  Weight Bearing Restrictions: weight bearing as tolerated  [] Right Upper Extremity  [] Left Upper Extremity [] Right Lower Extremity  [] Left Lower Extremity     Required Braces/Orthotics: no braces required   [] Right  [] Left  Positional Restrictions:no positional restrictions      Pre-Admission Information     Lives With:  roommates two     Type of Home: mobile home  Home Layout: one level  Home Access:  4 step to enter with handrail. Handrails are located on left side. Bathroom Layout: tub/shower unit  Toilet Height: standard height  Bathroom Equipment: grab bars in shower  Home Equipment: single point cane  Transfer Assistance: Independent without use of device  Ambulation Assistance:modified independent with use of cane  ADL Assistance: independent with all ADL's  IADL Assistance: independent with homemaking tasks  Active : [] yes  [x]no  Current Employment: disabled  Hobbies: sit home, play games and listen to music  Recent Falls: 0      Examination     Vision:   Vision Corrective Device: wears glasses for reading  Hearing:   [facility]  Perception:   WFL  Observation:   General Observation:  patient has global edema especially in abdomen and legs with skin break out all over back, blistering and skin break down in skin folds of abdomen and behind knees  Posture:   good  Sensation:   WFL  patient reports she does get numbness in legs when seated for a period of time  Proprioception:    WFL  Tone:   Normotonic  Coordination Testing:   WFL    ROM:   (B) UE AROM WFL  Strength:   (B) UE strength grossly WFL    Decision Making: medium complexity  Clinical Presentation: stable      Subjective    General: patient in semifowlers position in bed  Pain: 5/10. Location: both knees  Pain Interventions: RN notified, repositioned , and therapy activities modified           Activities of Daily Living    Basic Activities of Daily Living  Lower Extremity Bathing: moderate assistance   Lower Extremity Dressing: moderate assistance  Toileting: minimal assistance. General Comments: patient limited by abdominal edema and body habitus  Instrumental Activities of Daily Living  No IADL completed on this date.     Functional Mobility    Bed Mobility  Supine to Sit: stand by assistance  Comments:  Transfers  Sit to stand transfer:contact guard assistance  Shower transfer: contact guard assistance  Shower transfer equipment: bedside commode  Comments:  Functional Mobility:  Functional Mobility Device Use: rolling walker    Other Therapeutic Interventions      Functional Outcomes         Cognition  WFL  Orientation:    alert and oriented x 4  Command Following:   [facility]     Education    Barriers To Learning: physical  Patient Education: patient educated on goals, OT role and benefits, plan of care, ADL adaptive strategies, proper use of assistive device/equipment, adaptive device training, transfer training, discharge recommendations  Learning Assessment:  patient verbalizes understanding, would benefit from continued reinforcement    Assessment    Activity Tolerance: limited by pain   Impairments Requiring Therapeutic Intervention: decreased functional mobility, decreased ADL status, decreased ROM, decreased strength, decreased safety awareness, decreased endurance, decreased IADL, increased pain  Prognosis: good  Clinical Assessment: Patient is self motivated and presents with decreased independence with ADL, transfers, self care, due to late affects of morbid obesity, cellulitis, cirrhosis of the liver. She will benefit from ongoing therapy to improve overall independence and health status   Safety Interventions: patient left in chair, chair alarm in place, and call light within reach       Plan    Frequency: Eval with same day discharge. No follow up required.   Current Treatment Recommendations: strengthening, functional mobility training, transfer training, endurance training, neuromuscular re-education, patient/caregiver education, ADL/self-care training, IADL training, home management training, pain management, safety education, and equipment evaluation/education    Goals    Patient Goals: patient's goal is to increase independence with ADL and mobility      Short Term Goals:  Time Frame: discharge on this date, will continue treatment at home         Therapy Session Time     Individual Group Co-treatment   Time In     0830   Time Out     942   Minutes     72        Timed Code Treatment Minutes:   60    Total Treatment Minutes:  72    Electronically Signed By: Rehan Quiroga OT

## 2023-02-10 NOTE — CARE COORDINATION
Discharge note:      CM/SW has been notified of discharge. Patient noted to have the following needs at discharge. CM/SW has coordinated the following services:      Discharge Destination: [facility] was contacted they cannot take her due to her history of skilled nursing time and drugs     Called care connections        Transportation: Patient alert and oriented x4,able to ambulate independently without assistance and agreeable to discharge home with Lyft services. Discharging to home/apartment, address verified. Comment:      All CM/SW needs met, will sign off.     Electronically signed by Erika Ruvalcaba RN on 2/10/2023 at 2:10 PM

## 2023-02-10 NOTE — PROGRESS NOTES
AVS reviewed with pt., all questions answered. PIV removed, sheath intact, no bleeding noted and DSD applied. All pt. Belongings sent home with pt. Pt. Transported to main entrance via w/c and d/c to care of friend.

## 2023-02-10 NOTE — DISCHARGE INSTR - COC
Continuity of Care Form    Patient Name: Roger Gastelum   :  1975  MRN:  [id number]    Admit date:  2023  Discharge date:  ***    Code Status Order: Full Code   Advance Directives:     Admitting Physician:  Otis Hernandez MD  PCP: Skinny Raymundo MD    Discharging Nurse: [facility] Unit/Room#: 4HR-1054/1959-46  Discharging Unit Phone Number: ***    Emergency Contact:   Extended Emergency Contact Information  Primary Emergency Contact: Toshia Crain[address] Phone: [phone]  Relation: Brother/Sister    Past Surgical History:  Past Surgical History:   Procedure Laterality Date    ANKLE FRACTURE SURGERY      CARPAL TUNNEL RELEASE       SECTION      CHOLECYSTECTOMY      KNEE ARTHROSCOPY Bilateral     OVARY REMOVAL Right     TOTAL ANKLE ARTHROPLASTY      TUBAL LIGATION         Immunization History: There is no immunization history on file for this patient. 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 [address] F14.10    Chronic back pain M54.9, G89.29    Depression F32. A    Methamphetamine dependence (HCC) F15.20    Opioid dependence (HCC) F11.20    RSD (reflex sympathetic dystrophy) G90.50    Chronic hepatitis C (HCC) B18.2    Chronic type B viral hepatitis (HCC) B18.1    COPD (chronic obstructive pulmonary disease) (HCC) J44.9    Venous stasis dermatitis I87.2    Other cirrhosis of liver (HCC) K74.69    Current mild episode of major depressive disorder without prior episode (HCC) F32.0    Cirrhosis ([facility] K74.60       Isolation/Infection:   Isolation            No Isolation          Patient Infection Status       None to display            Nurse Assessment:  Last Vital Signs: /72   Pulse 93   Temp 97.9 °F (36.6 °C) (Oral)   Resp 16   Ht 5' 6\" (1.676 m)   Wt (!) 355 lb 6.1 oz (161.2 kg)   SpO2 93%   BMI 57.36 kg/m²     Last documented pain score (0-10 scale): Pain Level: 7  Last Weight:   Wt Readings from Last 1 Encounters:   02/10/23 (!) 355 lb 6.1 oz (161.2 kg)     Mental Status:  {IP PT MENTAL STATUS:20030}    IV Access:  { ROSAURA IV ACCESS:999024893}    Nursing Mobility/ADLs:  Walking   {P DME MZCE:180258421}  Transfer  {P DME LNLN:667146726}  Bathing  {P DME HABO:579952343}  Dressing  {P DME RQRZ:142780575}  Toileting  {P DME KMEI:319811092}  Feeding  {[facility] DME TRUX:338637545}  Med Admin  {[facility] DME RBAH:878914275}  Med Delivery   {[facility] MED Delivery:272592607}    Wound Care Documentation and Therapy:  Wound 02/08/23 Buttocks stage 1 non blanchable redness (Active)   Number of days: 1       Wound 02/08/23 Knee Left;Posterior (Active)   Dressing Status Clean;Dry; Intact; Reinforced dressing 02/08/23 2040   Dressing/Treatment Other (comment) 02/08/23 2040   Number of days: 1       Wound 02/08/23 Knee Right;Posterior (Active)   Dressing Status Reinforced dressing;Clean;Dry; Intact 02/08/23 2040   Dressing/Treatment Other (comment) 02/08/23 2040   Number of days: 1        Elimination:  Continence: Bowel: {YES / IP:08824}  Bladder: {YES / VC:43111}  Urinary Catheter: {Urinary Catheter:175007502}   Colostomy/Ileostomy/Ileal Conduit: {YES / MN:64732}       Date of Last BM: ***    Intake/Output Summary (Last 24 hours) at 2/10/2023 1352  Last data filed at 2/10/2023 1045  Gross per 24 hour   Intake 250 ml   Output 1420 ml   Net -1170 ml     I/O last 3 completed shifts:   In: 240 [P.O.:240]  Out: [address] [Urine:1420]    Safety Concerns:     [address] ROSAURA Safety Concerns:994483425}    Impairments/Disabilities:      [address] [facility] Impairments/Disabilities:086587386}    Nutrition Therapy:  Current Nutrition Therapy:   [address] [facility] Diet List:135070759}    Routes of Feeding: {CHP DME Other Feedings:631534273}  Liquids: {Slp liquid thickness:61819}  Daily Fluid Restriction: {CHP DME Yes amt example:430810869}  Last Modified Barium Swallow with Video (Video Swallowing Test): {Done Not Done QBJW:110838138}    Treatments at the Time of Hospital Discharge:   Respiratory Treatments: ***  Oxygen Therapy:  {Therapy; copd oxygen:16822}  Ventilator:    { CC Vent UICU:962609472}    Rehab Therapies: {THERAPEUTIC INTERVENTION:1939369473}  Weight Bearing Status/Restrictions: [address] [facility] Weight Bearin}  Other Medical Equipment (for information only, NOT a DME order):  {EQUIPMENT:515013087}  Other Treatments: ***    Patient's personal belongings (please select all that are sent with patient):  {CHP DME Belongings:822850055}    RN SIGNATURE:  {Esignature:776019546}    CASE MANAGEMENT/SOCIAL WORK SECTION    Inpatient Status Date: 23    Readmission Risk Assessment Score: 16  Readmission Risk              Risk of Unplanned Readmission:  16           Discharging to Facility/ [facility] [address]  [address]  Phone: [phone]  Fax: [phone]     Patient going home with a lyft.      Per OT   DME Required For Discharge: hand-held shower head, reacher, sock-aid, long-handled shoe horn, long-handle sponge, bariatric tub transfer bench, extra wide sock aide          / signature: Electronically signed by Praveen Iqbal RN on 2/10/23 at 1:53 PM EST    PHYSICIAN SECTION    Prognosis: Fair    Condition at Discharge: Stable    Rehab Potential (if transferring to Rehab): Good    Recommended Labs or Other Treatments After Discharge:  f/u GI for cirrhosis    Physician Certification: I certify the above information and transfer of Salomón King  is necessary for the continuing treatment of the diagnosis listed and that she requires Home Care for less 30 days.      Update Admission H&P: No change in H&P    PHYSICIAN SIGNATURE:  Electronically signed by Eusebio Weiner DO on 2/10/23 at 2:23 PM EST

## 2023-02-10 NOTE — PLAN OF CARE
Problem: Pain  Goal: Verbalizes/displays adequate comfort level or baseline comfort level  2/9/2023 2236 by Nguyen Santos RN  Outcome: Progressing  Flowsheets (Taken 2/9/2023 1300 by Fernanda Moran RN)  Verbalizes/displays adequate comfort level or baseline comfort level: Assess pain using appropriate pain scale  2/9/2023 1029 by Fernanda Moran RN  Outcome: Progressing  Flowsheets (Taken 2/9/2023 0900)  Verbalizes/displays adequate comfort level or baseline comfort level: Assess pain using appropriate pain scale     Problem: Safety - Adult  Goal: Free from fall injury  2/9/2023 2236 by Nguyen Santos RN  Outcome: Progressing  2/9/2023 1029 by Fernanda Moran RN  Outcome: Progressing  Flowsheets (Taken 2/9/2023 1029)  Free From Fall Injury: Instruct family/caregiver on patient safety     Problem: ABCDS Injury Assessment  Goal: Absence of physical injury  2/9/2023 2236 by Nguyen Santos RN  Outcome: Progressing  2/9/2023 1029 by Fernanda Moran RN  Outcome: Progressing  Flowsheets (Taken 2/9/2023 1029)  Absence of Physical Injury: Implement safety measures based on patient assessment     Problem: Skin/Tissue Integrity  Goal: Absence of new skin breakdown  Description: 1. Monitor for areas of redness and/or skin breakdown  2. Assess vascular access sites hourly  3. Every 4-6 hours minimum:  Change oxygen saturation probe site  4. Every 4-6 hours:  If on nasal continuous positive airway pressure, respiratory therapy assess nares and determine need for appliance change or resting period.   2/9/2023 2236 by Nguyen Santos RN  Outcome: Progressing  2/9/2023 1029 by Fernanda Moran RN  Outcome: Progressing     Problem: Respiratory - Adult  Goal: Achieves optimal ventilation and oxygenation  2/9/2023 2236 by Nguyen Santos RN  Outcome: Progressing  2/9/2023 1029 by Fernanda Moran RN  Outcome: Progressing  Flowsheets (Taken 2/9/2023 0913)  Achieves optimal ventilation and oxygenation: Assess for changes in respiratory status     Problem: Skin/Tissue Integrity - Adult  Goal: Incisions, wounds, or drain sites healing without S/S of infection  2/9/2023 2236 by Migel Ureña RN  Outcome: Progressing  2/9/2023 1029 by Teodoro Hogan RN  Outcome: Progressing  Flowsheets  Taken 2/9/2023 1029  Incisions, Wounds, or Drain Sites Healing Without Sign and Symptoms of Infection: TWICE DAILY: Assess and document skin integrity  Taken 2/9/2023 0913  Incisions, Wounds, or Drain Sites Healing Without Sign and Symptoms of Infection: TWICE DAILY: Assess and document skin integrity     Problem: Musculoskeletal - Adult  Goal: Return mobility to safest level of function  2/9/2023 2236 by Migel Ureña RN  Outcome: Progressing  2/9/2023 1029 by Teodoro Hogan RN  Outcome: Progressing  Flowsheets (Taken 2/9/2023 0913)  Return Mobility to Safest Level of Function: Assess patient stability and activity tolerance for standing, transferring and ambulating with or without assistive devices     Problem: Metabolic/Fluid and Electrolytes - Adult  Goal: Hemodynamic stability and optimal renal function maintained  2/9/2023 2236 by Migel Ureña RN  Outcome: Progressing  2/9/2023 1029 by Teodoro Hogan RN  Outcome: Progressing  Flowsheets (Taken 2/9/2023 0913)  Hemodynamic stability and optimal renal function maintained:   Monitor labs and assess for signs and symptoms of volume excess or deficit   Monitor intake, output and patient weight

## 2023-02-10 NOTE — DISCHARGE SUMMARY
Hospital Medicine Discharge Summary    Patient ID: Shilpa Albarran      Patient's PCP: Jo Ann Mata MD    Admit Date: 2/8/2023     Discharge Date:   2/10/2023    Admitting Physician: Sienna Lara MD     Discharge Physician: Juan Coy DO     Discharge Diagnoses: Active Hospital Problems    Diagnosis Date Noted    Cirrhosis [facility]) [K74.60] 02/08/2023     Priority: Medium       The patient was seen and examined on day of discharge and this discharge summary is in conjunction with any daily progress note from day of discharge. Hospital Course:       48-year-old female medical history significant for anxiety depression asthma COPD IVDA morbid obesity hep B and hep C. Presents with worsening abdominal swelling found to have cirrhosis with ascites as well as LE cellulitis likely realted to lymphedema and chronic venous stasis disease. She underwent paracentesis with >3L removed. She improved over the course of her stay. Discharged home with home care in good condition. Follow up pcp in 1 week, follow up with established GI service outpatient. Exam:     /72   Pulse 93   Temp 97.9 °F (36.6 °C) (Oral)   Resp 19   Ht 5' 6\" (1.676 m)   Wt (!) 355 lb 6.1 oz (161.2 kg)   SpO2 98%   BMI 57.36 kg/m²       General appearance:  No apparent distress, appears stated age and cooperative. HEENT:  Normal cephalic, atraumatic without obvious deformity. Pupils equal, round, and reactive to light. Extra ocular muscles intact. Conjunctivae/corneas clear. Neck: Supple, with full range of motion. No jugular venous distention. Trachea midline. Respiratory:  Normal respiratory effort. Clear to auscultation, bilaterally without Rales/Wheezes/Rhonchi. Cardiovascular:  Regular rate and rhythm with normal S1/S2 without murmurs, rubs or gallops. Abdomen: Soft, non-tender, non-distended with normal bowel sounds. Musculoskeletal:  No clubbing, cyanosis or edema bilaterally.   Full range of motion without deformity. Skin: Skin color, texture, turgor normal.  No rashes or lesions. Neurologic:  Neurovascularly intact without any focal sensory/motor deficits. Cranial nerves: II-XII intact, grossly non-focal.  Psychiatric:  Alert and oriented, thought content appropriate, normal insight  Capillary Refill: Brisk,< 3 seconds   Peripheral Pulses: +2 palpable, equal bilaterally       Labs: For convenience and continuity at follow-up the following most recent labs are provided:      CBC:    Lab Results   Component Value Date/Time    WBC 6.8 02/10/2023 06:01 AM    HGB 11.8 02/10/2023 06:01 AM    HCT 35.1 02/10/2023 06:01 AM     02/10/2023 06:01 AM       Renal:    Lab Results   Component Value Date/Time     02/10/2023 06:01 AM    K 4.1 02/10/2023 06:01 AM     02/10/2023 06:01 AM    CO2 27 02/10/2023 06:01 AM    BUN 6 02/10/2023 06:01 AM    CREATININE 0.6 02/10/2023 06:01 AM    CALCIUM 8.1 02/10/2023 06:01 AM         Significant Diagnostic Studies    Radiology:   IR US GUIDED PARACENTESIS   Final Result   Successful paracentesis. CT ABDOMEN PELVIS W IV CONTRAST Additional Contrast? None   Final Result   Advanced cirrhosis of the liver, with moderate to large amount of ascites,   moderate splenomegaly, and evidence of portal hypertension with portosystemic   collaterals. Mild distension of some proximal loop of small bowel. This may represent   mild obstruction or localized ileus and requires clinical correlation. Fat containing ventral hernia. Extensive subcutaneous fat around the abdomen   and pelvis in keeping with anasarca. Degenerative disc disease of the L1-L2 disc. XR CHEST PORTABLE   Final Result   1. No acute chest abnormality. 2. Right tibia and fibula demonstrate no acute fractures or dislocations. Moderate to severe right knee degenerative changes. Soft tissue edema. 3. Left tibia and fibula demonstrate no acute fractures or dislocations. Severe tricompartmental degenerative changes of the left knee. Soft tissue   edema. XR TIBIA FIBULA LEFT (2 VIEWS)   Final Result   1. No acute chest abnormality. 2. Right tibia and fibula demonstrate no acute fractures or dislocations. Moderate to severe right knee degenerative changes. Soft tissue edema. 3. Left tibia and fibula demonstrate no acute fractures or dislocations. Severe tricompartmental degenerative changes of the left knee. Soft tissue   edema. XR TIBIA FIBULA RIGHT (2 VIEWS)   Final Result   1. No acute chest abnormality. 2. Right tibia and fibula demonstrate no acute fractures or dislocations. Moderate to severe right knee degenerative changes. Soft tissue edema. 3. Left tibia and fibula demonstrate no acute fractures or dislocations. Severe tricompartmental degenerative changes of the left knee. Soft tissue   edema. Consults:     IP CONSULT TO HOSPITALIST  IP CONSULT TO INTERVENTIONAL RADIOLOGY  IP CONSULT TO GI  IP CONSULT TO DIETITIAN    Disposition:   home with home care. Condition at Discharge: Stable    Discharge Instructions/Follow-up:   pcp 1 week. Code Status:  Full Code      Activity: activity as tolerated    Diet: regular diet    Discharge Medications:     Current Discharge Medication List             Details   gabapentin (NEURONTIN) 400 MG capsule Take 400 mg by mouth 3 times daily.       spironolactone (ALDACTONE) 50 MG tablet Take 1 tablet by mouth daily  Qty: 30 tablet, Refills: 1    Associated Diagnoses: Other cirrhosis of liver (HCC)      furosemide (LASIX) 40 MG tablet Take 1 tablet by mouth daily  Qty: 30 tablet, Refills: 0    Associated Diagnoses: Other cirrhosis of liver (HCC)      albuterol sulfate HFA (VENTOLIN HFA) 108 (90 Base) MCG/ACT inhaler Inhale 2 puffs into the lungs 4 times daily as needed for Wheezing  Qty: 54 g, Refills: 1    Associated Diagnoses: Simple chronic bronchitis (HCC)      amitriptyline (ELAVIL) 50 MG tablet Take 1 tablet by mouth nightly  Qty: 90 tablet, Refills: 1    Associated Diagnoses: Anxiety; Primary insomnia      amLODIPine (NORVASC) 2.5 MG tablet Take 1 tablet by mouth daily  Qty: 90 tablet, Refills: 1    Associated Diagnoses: Primary hypertension      Compression Stockings MISC by Does not apply route  Qty: 1 each, Refills: 0    Associated Diagnoses: Venous stasis dermatitis, unspecified laterality      busPIRone (BUSPAR) 5 MG tablet Take 1 tablet by mouth 2 times daily  Qty: 60 tablet, Refills: 0    Associated Diagnoses: Anxiety      venlafaxine (EFFEXOR XR) 150 MG extended release capsule Take 1 capsule by mouth daily  Qty: 30 capsule, Refills: 2    Associated Diagnoses: Anxiety      umeclidinium-vilanterol (ANORO ELLIPTA) 62.5-25 MCG/INH AEPB inhaler Inhale 1 puff into the lungs daily  Qty: 1 each, Refills: 2    Associated Diagnoses: Pulmonary hypertension (HCC)      diclofenac (VOLTAREN) 50 MG EC tablet Take 1 tablet by mouth 3 times daily (with meals)  Qty: 60 tablet, Refills: 3    Associated Diagnoses: Primary osteoarthritis of right knee; Primary osteoarthritis of left knee             Time Spent on discharge is more than 45 minutes in the examination, evaluation, counseling and review of medications and discharge plan. Signed: Ramiro Gomes DO   2/10/2023      Thank you Mynor Yoder MD for the opportunity to be involved in this patient's care. If you have any questions or concerns please feel free to contact me at [phone].

## 2023-02-10 NOTE — PROGRESS NOTES
[facility] Department   Phone: [phone]    Physical Therapy    [x] Initial Evaluation            [] Daily Treatment Note         [] Discharge Summary      Patient: Shilpa Albarran   : 1975   MRN: [id number]   Date of Service:  2/10/2023  Admitting Diagnosis: Cirrhosis [facility])  Current Admission Summary: Shilpa Albarran is a 51 y.o. female who presents to the emergency department with primary complaint of abdominal pain and bloating. States onset 6 weeks ago. States incarcerated at that time when it began. States she was in [facility] senior care due to a 5year-old warrant having had Seroquel in her purse. Since being released the patient continues with abdominal pain and bloating which has progressed. She reports no nausea, vomiting, diarrhea or constipation. She does report prior abdominal surgeries including cholecystectomy, D&C, abdominal wall hernia repair 16 years ago and right ovary removal.     Patient also reports seeing urinary incontinence since which has been ongoing for the past several weeks. She reports no dysuria associated. The patient reporting bilateral lower extremity edema a bit more than her baseline and currently on Lasix 40 mg. The patient indicating open wound posterior left knee. We will need to evaluate when she is moved to a bed. Patient expresses concern regarding cellulitis bilateral extremities. Past Medical History:  has a past medical history of Anxiety, Arthritis, Asthma, COPD (chronic obstructive pulmonary disease) ([address] Depression, and RSD lower limb. Past Surgical History:  has a past surgical history that includes  section; Ovary removal (Right); Carpal tunnel release; Tubal ligation (); Total ankle arthroplasty; Ankle fracture surgery; Knee arthroscopy (Bilateral); and Cholecystectomy. Discharge Recommendations: Shilpa Albarran scored a 15/24 on the AM-PAC short mobility form.  Current research shows that an AM-PAC score of 18 or greater is typically associated with a discharge to the patient's home setting. This patient feels she can discharge home at her current functional level. Based on the patient's AM-PAC score and their current functional mobility deficits, it is recommended that the patient have 2-3 sessions per week of Physical Therapy at d/c to increase the patient's independence. At this time, this patient demonstrates the endurance and safety to discharge home with HHPT and a follow up treatment frequency of 2-3x/wk. Please see assessment section for further patient specific details. HOME HEALTH CARE: LEVEL 4 SICK     - Initial home health evaluation to occur within 24-48 hours, in patient home   - Therapy evaluations in home within 24-48 hours of discharge; including DME and home safety   - Frontload therapy 5 days, then 3x a week   - Therapy to evaluate if patient has [address] needs for personal care   -  evaluation within 24-48 hours, includes evaluation of resources and insurance to determine [place], [place], LTC, and Medicaid options     If patient discharges prior to next session this note will serve as a discharge summary. Please see below for the latest assessment towards goals. DME Required For Discharge: rolling walker, bariatric  Precautions/Restrictions: high fall risk  Weight Bearing Restrictions: no restrictions  [] Right Upper Extremity  [] Left Upper Extremity [] Right Lower Extremity  [] Left Lower Extremity     Required Braces/Orthotics: no braces required   [] Right  [] Left  Positional Restrictions:no positional restrictions    Pre-Admission Information   Lives With:  roommates two                Type of Home: mobile home  Home Layout: one level  Home Access:  4 step to enter with handrail. Handrails are located on left side.   Bathroom Layout: tub/shower unit  Toilet Height: standard height  Bathroom Equipment: grab bars in shower  Home Equipment: single point cane  Transfer Assistance: Independent without use of device  Ambulation Assistance:modified independent with use of cane  ADL Assistance: independent with all ADL's  IADL Assistance: independent with homemaking tasks  Active : [] yes             [x]no  Current Employment: disabled  Hobbies: sit home, play games and listen to music  Recent Falls: 0        Examination      Vision:   Vision Corrective Device: wears glasses for reading  Hearing:   [facility]  Observation:   General Observation:  Pt has irritated skin, requests a shower during treatment session, interdry placed behind knees per pt request    Sensation:   reports numbness and tingling in (B) LE during long periods of sitting     ROM:   Hip flexion grossly limited  due to body habitus, able to flex hips/knees from 90/90 position to extend in standing   Strength:   (L) Hip: +3        (R) Hip: +3  (L) Knee: +3     (R) Knee: +3  (L) Ankle: 4     (R) Ankle: 4  Therapist Clinical Decision Making (Complexity): low complexity  Clinical Presentation: evolving      Subjective  General: pt supine in bed upon entry. Pt agreeable to therapy. Pt requests a shower during the eval   Pain: 5/10. Location: BL knees  Pain Interventions: RN notified of patient request for pain medication and repositioned        Functional Mobility  Bed Mobility  Supine to Sit: minimal assistance  Scooting: minimal assistance  Comments: needed to support left leg due to pain during bed mobility, HOB raised   Transfers  Sit to stand transfer: contact guard assistance, moderate assistance  Stand to sit transfer: minimal assistance  Comments: Pt STS improved from modA to CGA during session. Pt needs min assist to sit down due to uncontrolled lowering   Ambulation  Surface:level surface  Assistive Device: rolling walker  Assistance: contact guard assistance x2 for safety improving to CGA of 1   Distance: 20' x2  Gait Mechanics: Pt had slowed gait speed with decreased step length.  Pt had wide ANTONIO due to swelling and had trunk lateral deviation when ambulating, decreased B knee flexion  Comments:    Stair Mobility  Stair mobility not completed on this date. Comments:  Wheelchair Mobility:  No w/c mobility completed on this date. Comments:  Balance  Static Sitting Balance: fair: maintains balance at CGA without use of UE support  Dynamic Sitting Balance: fair: maintains balance at CGA without use of UE support  Static Standing Balance: fair (-): maintains balance at CGA with use of UE support  Dynamic Standing Balance: fair (-): maintains balance at CGA with use of UE support  Comments:    Other Therapeutic Interventions  Pt performed shower with OT. See OT note for details. Multiple sit to stands completed    Functional Outcomes  AM-PAC Inpatient Mobility Raw Score : 15              Cognition  WFL  Orientation:    alert and oriented x 4  Command Following:   [facility]    Education  Barriers To Learning: none  Patient Education: patient educated on goals, PT role and benefits, plan of care, functional mobility training, discharge recommendations  Learning Assessment:  patient verbalizes and demonstrates understanding    Assessment  Activity Tolerance: Pt activity tolerance is limited due to pain in knees   Impairments Requiring Therapeutic Intervention: decreased functional mobility, decreased ROM, decreased strength, decreased safety awareness, decreased endurance, decreased balance  Prognosis: good  Clinical Assessment: Pt imporved throughout session for assist level with mobility tasks. The biggest limiting factor to pts functional mobility at this ponit is cellulitis in BLE and pain in knees. Pt demonstrates deviation from her baseline and will benefit to continue to see therapy while in hospital. She feels safe to return home at discharge and did not feel she will need a SNF rehab unit for therapy.   Upon discharge it was discussed with pt that HHPT be reccommended to continue to address strength, endurance and functional mobility deficits. Safety Interventions: patient left in chair, chair alarm in place, call light within reach, patient at risk for falls, and nurse notified    Plan  Frequency: 3-5 x/per week  Current Treatment Recommendations: strengthening, ROM, balance training, functional mobility training, transfer training, gait training, stair training, endurance training, and neuromuscular re-education    Goals  Patient Goals: none stated   Short Term Goals:  Time Frame: discharge   Patient will complete bed mobility at stand by assistance   Patient will complete transfers at stand by assistance   Patient will ambulate 50 ft with use of rolling walker at stand by assistance  Patient will ascend/descend 4 stairs with (L) ascending handrail at contact guard assistance  Patient will complete car transfer at contact guard assistance    Therapy Session Time      Individual Group Co-treatment   Time In     [id number]   Time Out     0942   Minutes     68     Timed Code Treatment Minutes:  53 Minutes  Total Treatment Minutes:  68 minutes        Electronically Signed By: ROSA Albright SPT  I agree with the above note. PT directly observed the SPT with the patient.   Kendal Scheuermann, [place] DPT [id number]

## 2023-02-10 NOTE — FLOWSHEET NOTE
Post procedure chart reviewed, please call radiology RN with any questions/concerns r/t para on 2/9/23

## 2023-02-10 NOTE — PROGRESS NOTES
Gastroenterology Progress Note            Rubi Perez is a 51 y.o. female patient. Principal Problem:    Cirrhosis ([address]  Resolved Problems:    * No resolved hospital problems. *      SUBJECTIVE:  Feels ok. Abd feels better post paracentesis. Denies abd pain. ROS:    Gastrointestinal ROS: no abdominal pain, change in bowel habits, or black or bloody stools. Cardiovascular ROS: negative  Respiratory ROS: negative    Physical    VITALS:  /79   Pulse 92   Temp 98.1 °F (36.7 °C) (Oral)   Resp 17   Ht 5' 6\" (1.676 m)   Wt (!) 355 lb 6.1 oz (161.2 kg)   SpO2 98%   BMI 57.36 kg/m²   TEMPERATURE:  Current - Temp: 98.1 °F (36.7 °C); Max - Temp  Av.1 °F (36.7 °C)  Min: 97.6 °F (36.4 °C)  Max: 98.7 °F (37.1 °C)    NAD  RRR, Nl s1s2  Lungs CTA Bilaterally, normal effort  Abdomen Obese, soft, less distended, NT, no HSM, Bowel sounds normal.  Ext:  2+ pretibial edema with erythema. Data    Data Review:    Recent Labs     23  0501 02/10/23  0601   WBC 6.9 6.9 6.8   HGB 13.2 12.0 11.8*   HCT 40.8 35.5* 35.1*   .3* 99.0 100.4*   * 111* 125*     Recent Labs     23  1217 23  0501 02/10/23  0601   * 137 137   K 3.3* 3.4* 4.1    103 103   CO2 26 29 27   BUN 5* 6* 6*   CREATININE 0.7 0.7 0.6     Recent Labs     23  1217 23  0501 02/10/23  0601   AST 58* 55* 53*   ALT 17 22 20   BILIDIR 0.5*  --   --    BILITOT 1.3* 1.1* 1.1*   ALKPHOS 232* 189* 168*     Recent Labs     23  1217   LIPASE 17.0     Recent Labs     23  1215   PROTIME 17.0*   INR 1.39*     No results for input(s): PTT in the last 72 hours. Radiology Review:  Abd/Pelvic CT 2023 :       FINDINGS:   Lower Chest: Unremarkable       Organs: There is evidence of advanced cirrhosis of the liver. This is   associated with moderate to large amount of ascites. There is moderate   splenomegaly.   There is prominent collateral venous structure noted along the   mesentery, with multiple prominent anterior abdominal wall venous   collaterals. There is some recanalization of the portal vein. Findings are   in keeping with portosystemic collaterals and portal hypertension. Patient is status post cholecystectomy. The pancreas appears normal.  The   adrenal glands and kidneys appear normal.       GI/Bowel: The stomach appears normal.  There is some mild distension of some   proximal loops of small bowel. The distal small bowel is of normal caliber. The colon appears normal.       Pelvis: The uterus, bladder and rectum appear normal.  There is no pelvic   adenopathy. Peritoneum/Retroperitoneum: Unremarkable       Bones/Soft Tissues: There is a fat containing ventral hernia. There is   significant edema of the subcutaneous fat of the abdomen and pelvis. There   is degenerative disc disease of the L1-L2 disc, with disc space narrowing and   anterior osteophytes. Impression   Advanced cirrhosis of the liver, with moderate to large amount of ascites,   moderate splenomegaly, and evidence of portal hypertension with portosystemic   collaterals. Mild distension of some proximal loop of small bowel. This may represent   mild obstruction or localized ileus and requires clinical correlation. Fat containing ventral hernia. Extensive subcutaneous fat around the abdomen   and pelvis in keeping with anasarca. Degenerative disc disease of the L1-L2 disc. Assessment/Plan:      Ascites -no prior paracentesis. CT with moderate to large ascites. Paracentesis with cell count, culture, albumin, protein, cytology  Give albumin at time of paracentesis  2 g low-sodium diet with dietary consult for education  Daily weights  On Lasix 40 mg daily. Increase Aldactone to 100 mg daily. 2.  Cirrhosis - h/o HCV and HBV but labs from 2022 with neg HCV RNA and neg HBV DNA.  Saw Dr. Anthony Garcia once last year for elevated liver enzymes but then did not f/u thereafter and did not get blood work done. No history of hepatic encephalopathy. No prior EGD for variceal screening. Recommend outpatient screening EGD  Check HBV - core AB, surface AB, surface AG  HCV RNA  LEIGH, f-actin, AMA, AFP, ferritin, A1AT, ceruloplasmin, Hepa total AB     Will follow up on labs. She must be compliant with diuretic regimen. She can be discharged from GI standpoint on present regimen. She should call our office if needs repeat paracentesis. Will sign off. Please call with questions.     Fredy Crowley, [address] [place] [address] Road  2/10/2023

## 2023-02-11 LAB
CERULOPLASMIN: 22 MG/DL (ref 16–45)
F-ACTIN AB IGG: 28 UNITS (ref 0–19)
HAV AB SERPL IA-ACNC: POSITIVE
HEPATITIS B CORE TOTAL ANTIBODY: POSITIVE

## 2023-02-12 LAB
ALPHA-1 ANTITRYPSIN PHENOTYPE: NORMAL
ALPHA-1 ANTITRYPSIN: 100 MG/DL (ref 90–200)
BLOOD CULTURE, ROUTINE: NORMAL
BODY FLUID CULTURE, STERILE: NORMAL
CULTURE, BLOOD 2: NORMAL
GRAM STAIN RESULT: NORMAL
HCV QNT BY NAAT IU/ML: NOT DETECTED IU/ML
HCV QNT BY NAAT LOG IU/ML: NOT DETECTED LOG IU/ML
INTERPRETATION: NOT DETECTED

## 2023-02-13 LAB
F-ACTIN AB IGG: 28 UNITS (ref 0–19)
HEPATITIS B SURFACE ANTIGEN CONFIRMATION: POSITIVE
SMOOTH MUSCLE AB IGG TITER: ABNORMAL

## 2023-02-14 LAB — MITOCHONDRIAL M2 AB, IGG: 3.7 U/ML (ref 0–4)

## 2023-02-15 PROBLEM — J96.01 ACUTE RESPIRATORY FAILURE WITH HYPOXIA (HCC): Status: ACTIVE | Noted: 2023-01-01

## 2023-02-15 NOTE — ED NOTES
Pt O2 95 on RA upon arrival dropped to 88. Pt placed on 2 lpm via NC.  O2 100%     Inessa Jackson RN  02/15/23 [id number]

## 2023-02-15 NOTE — ED PROVIDER NOTES
[facility]) Emergency [address] [facility]    Tad Leung MD, am the primary clinician of record. CHIEF COMPLAINT  Chief Complaint   Patient presents with    Drug Overdose     Pt arrived via [facility] from home d/t heroin OD. Per squad pt responsive to sternal rub only. Pt got 8 mg Narcan via [place]. Pt w liver cirrhosis         HISTORY OF PRESENT ILLNESS  Burnette Frankel is a 51 y.o. female  who presents to the ED complaining of multiple significant issues. The patient actually is here today after a reported heroin overdose requiring EMS Narcan. On arrival she did not require any redosing of Narcan but she is somewhat confused and sleepy. She was hypoxic which is a new problem for her and reports body aches generalized and coughing and cold symptoms as well as nasal congestion. She reports worsening abdominal distention as well and has a known history of cirrhosis. She does have some generalized abdominal pains and also complains of some shortness of breath but does not have any chest pains. She denies headache to me. Of note she was admitted from the  until the  of this month and during that admission was treated for COPD with abdominal swelling, cirrhosis and ascites and potential leg cellulitis. During that admission she did have a paracentesis performed as well. She does not have a baseline oxygen requirement. She does note her legs are actually improving she was discharged with cefdinir. No other complaints, modifying factors or associated symptoms. I have reviewed the following from the nursing documentation. Past Medical History:   Diagnosis Date    Anxiety     Arthritis     Asthma     COPD (chronic obstructive pulmonary disease) ([facility] [address] 6/3/2022    Depression     lost 2 children, apx 2012.     RSD lower limb     right foot     Past Surgical History:   Procedure Laterality Date    ANKLE FRACTURE SURGERY      CARPAL TUNNEL RELEASE       SECTION CHOLECYSTECTOMY      KNEE ARTHROSCOPY Bilateral     OVARY REMOVAL Right     TOTAL ANKLE ARTHROPLASTY      TUBAL LIGATION  1994     Family History   Problem Relation Age of Onset    Depression Mother     Anxiety Disorder Mother     Arthritis Other     Asthma Other     Cancer Other     Diabetes Other     High Blood Pressure Other     High Blood Pressure Sister     Depression Sister     Anxiety Disorder Sister     Heart Disease Maternal Grandfather      Social History     Socioeconomic History    Marital status:      Spouse name: Not on file    Number of children: 3    Years of education: Not on file    Highest education level: Not on file   Occupational History    Occupation: Disabled   Tobacco Use    Smoking status: Every Day     Packs/day: 0.50     Years: 35.00     Pack years: 17.50     Types: Cigarettes     Start date: 1987    Smokeless tobacco: Never   Vaping Use    Vaping Use: Never used   Substance and Sexual Activity    Alcohol use: No     Comment: 1x a year maybe     Drug use: Yes     Types: Opiates      Comment: in past with death of children     Sexual activity: Yes     Partners: Male   Other Topics Concern    Not on file   Social History Narrative    Not on file     Social Determinants of Health     Financial Resource Strain: High Risk    Difficulty of Paying Living Expenses: Very hard   Food Insecurity: No Food Insecurity    Worried About Running Out of Food in the Last Year: Never true    Ran Out of Food in the Last Year: Never true   Transportation Needs: Not on file   Physical Activity: Not on file   Stress: Not on file   Social Connections: Not on file   Intimate Partner Violence: Not on file   Housing Stability: Not on file     Current Facility-Administered Medications   Medication Dose Route Frequency Provider Last Rate Last Admin    cefTRIAXone (ROCEPHIN) 1,000 mg in sodium chloride 0.9 % 50 mL IVPB (mini-bag)  1,000 mg IntraVENous Once Meghan Goldsmith MD        furosemide (LASIX) injection 80 mg  80 mg IntraVENous Once Dre Briseno MD        lactulose [facility]) 10 GM/15ML solution 20 g  20 g Oral Once Dre Briseno MD         Current Outpatient Medications   Medication Sig Dispense Refill    spironolactone (ALDACTONE) 100 MG tablet Take 1 tablet by mouth daily 30 tablet 3    furosemide (LASIX) 40 MG tablet Take 1 tablet by mouth daily 30 tablet 0    cefdinir (OMNICEF) 300 MG capsule Take 1 capsule by mouth 2 times daily for 10 days 20 capsule 0    gabapentin (NEURONTIN) 400 MG capsule Take 400 mg by mouth 3 times daily. albuterol sulfate HFA (VENTOLIN HFA) 108 (90 Base) MCG/ACT inhaler Inhale 2 puffs into the lungs 4 times daily as needed for Wheezing 54 g 1    gabapentin (NEURONTIN) 400 MG capsule TAKE ONE CAPSULE BY MOUTH THREE TIMES A DAY 90 capsule 0    amitriptyline (ELAVIL) 50 MG tablet Take 1 tablet by mouth nightly (Patient not taking: Reported on 2/8/2023) 90 tablet 1    amLODIPine (NORVASC) 2.5 MG tablet Take 1 tablet by mouth daily (Patient not taking: Reported on 2/8/2023) 90 tablet 1    Compression Stockings MISC by Does not apply route 1 each 0    busPIRone (BUSPAR) 5 MG tablet Take 1 tablet by mouth 2 times daily 60 tablet 0    venlafaxine (EFFEXOR XR) 150 MG extended release capsule Take 1 capsule by mouth daily (Patient not taking: Reported on 2/8/2023) 30 capsule 2    umeclidinium-vilanterol (ANORO ELLIPTA) 62.5-25 MCG/INH AEPB inhaler Inhale 1 puff into the lungs daily 1 each 2    diclofenac (VOLTAREN) 50 MG EC tablet Take 1 tablet by mouth 3 times daily (with meals) (Patient not taking: Reported on 2/8/2023) 60 tablet 3     Allergies   Allergen Reactions    Darvocet A500 [Propoxyphene N-Acetaminophen] Hives    Penicillins Hives       REVIEW OF SYSTEMS  10 systems reviewed, pertinent positives per HPI otherwise noted to be negative.     PHYSICAL EXAM  /71   Pulse 99   Temp 98.5 °F (36.9 °C) (Oral)   Resp 17   Ht 5' 6\" (1.676 m)   Wt (!) 355 lb (161 kg)   SpO2 100%   BMI 57.30 kg/m²    GENERAL APPEARANCE: Awake and somewhat disoriented and confused. Cooperative. No distress. HENT: Normocephalic. Atraumatic. Mucous membranes are dry. NECK: Supple. Mild JVD  EYES: PERRL. EOM's grossly intact. HEART/CHEST: Borderline tachycardic regular rhythm. No murmurs. No chest wall tenderness. LUNGS: Mild expiratory wheezing throughout with scattered rhonchi, tachypnea present, stable on nasal cannula oxygen, diminished throughout based on habitus, bibasilar rales present  ABDOMEN: Mild diffuse nonfocal tenderness with moderate distention and tense ascites but no peritonitis. MUSCULOSKELETAL: Full plus bilateral lower extremity edema. Redness noted to the left leg although patient reports improved compared to recent admission for cellulitis. Compartments soft. No deformity. No tenderness in the extremities. All extremities neurovascularly intact. SKIN: Warm and dry. No acute rashes. NEUROLOGICAL: Alert and disoriented/confused. CN's 2-12 intact. No gross facial drooping. Strength 5/5, sensation intact. 2 plus DTR's in knees bilaterally. Gait not assessed. LABS  I have personally reviewed all labs for this visit.    Results for orders placed or performed during the hospital encounter of 02/15/23   Rapid influenza A/B antigens    Specimen: Nasopharyngeal   Result Value Ref Range    Rapid Influenza A Ag Negative Negative    Rapid Influenza B Ag Negative Negative   COVID-19, Rapid    Specimen: Nasopharyngeal Swab   Result Value Ref Range    SARS-CoV-2, NAAT DETECTED (A) Not Detected   CBC with Auto Differential   Result Value Ref Range    WBC 11.6 (H) 4.0 - 11.0 K/uL    RBC 3.77 (L) 4.00 - 5.20 M/uL    Hemoglobin 12.5 12.0 - 16.0 g/dL    Hematocrit 39.0 36.0 - 48.0 %    .5 (H) 80.0 - 100.0 fL    MCH 33.2 26.0 - 34.0 pg    MCHC 32.1 31.0 - 36.0 g/dL    RDW 18.2 (H) 12.4 - 15.4 %    Platelets 585 226 - 825 K/uL    MPV 9.3 5.0 - 10.5 fL    SLIDE REVIEW see below     Neutrophils % 84.0 %    Lymphocytes % 7.0 %    Monocytes % 7.0 %    Eosinophils % 0.0 %    Basophils % 0.0 %    Neutrophils Absolute 10.0 (H) 1.7 - 7.7 K/uL    Lymphocytes Absolute 0.8 (L) 1.0 - 5.1 K/uL    Monocytes Absolute 0.8 0.0 - 1.3 K/uL    Eosinophils Absolute 0.0 0.0 - 0.6 K/uL    Basophils Absolute 0.0 0.0 - 0.2 K/uL    Bands Relative 2 0 - 7 %    Anisocytosis Occasional (A)     Macrocytes Occasional (A)     Ovalocytes Occasional (A)    Comprehensive Metabolic Panel w/ Reflex to MG   Result Value Ref Range    Sodium 136 136 - 145 mmol/L    Potassium reflex Magnesium 4.8 3.5 - 5.1 mmol/L    Chloride 99 99 - 110 mmol/L    CO2 20 (L) 21 - 32 mmol/L    Anion Gap 17 (H) 3 - 16    Glucose 116 (H) 70 - 99 mg/dL    BUN 9 7 - 20 mg/dL    Creatinine 1.3 (H) 0.6 - 1.1 mg/dL    Est, Glom Filt Rate 50 (A) >60    Calcium 8.4 8.3 - 10.6 mg/dL    Total Protein 7.5 6.4 - 8.2 g/dL    Albumin 2.8 (L) 3.4 - 5.0 g/dL    Albumin/Globulin Ratio 0.6 (L) 1.1 - 2.2    Total Bilirubin 1.1 (H) 0.0 - 1.0 mg/dL    Alkaline Phosphatase 209 (H) 40 - 129 U/L    ALT 36 10 - 40 U/L     (H) 15 - 37 U/L   Protime-INR   Result Value Ref Range    Protime 18.3 (H) 11.7 - 14.5 sec    INR 1.52 (H) 0.87 - 1.14   Troponin   Result Value Ref Range    Troponin 0.06 (H) <0.01 ng/mL   Brain Natriuretic Peptide   Result Value Ref Range    Pro- (H) 0 - 124 pg/mL   Lactate, Sepsis   Result Value Ref Range    Lactic Acid, Sepsis 5.5 (HH) 0.4 - 1.9 mmol/L   Procalcitonin   Result Value Ref Range    Procalcitonin 0.79 (H) 0.00 - 0.15 ng/mL   Urinalysis with Reflex to Culture    Specimen: Urine, clean catch   Result Value Ref Range    Color, UA DARK YELLOW (A) Straw/Yellow    Clarity, UA TURBID (A) Clear    Glucose, Ur 100 (A) Negative mg/dL    Bilirubin Urine SMALL (A) Negative    Ketones, Urine Negative Negative mg/dL    Specific Gravity, UA 1.015 1.005 - 1.030    Blood, Urine LARGE (A) Negative    pH, UA 5.5 5.0 - 8.0 Protein,  Negative mg/dL    Urobilinogen, Urine 1.0 <2.0 E.U./dL    Nitrite, Urine Negative Negative    Leukocyte Esterase, Urine MODERATE (A) Negative    Microscopic Examination YES     Urine Type NotGiven     Urine Reflex to Culture Yes    Urine Drug Screen   Result Value Ref Range    Amphetamine Screen, Urine POSITIVE (A) Negative <1000ng/mL    Barbiturate Screen, Ur Neg Negative <200 ng/mL    Benzodiazepine Screen, Urine Neg Negative <200 ng/mL    Cannabinoid Scrn, Ur Neg Negative <50 ng/mL    Cocaine Metabolite Screen, Urine Neg Negative <300 ng/mL    Opiate Scrn, Ur Neg Negative <300 ng/mL    PCP Screen, Urine Neg Negative <25 ng/mL    Methadone Screen, Urine Neg Negative <300 ng/mL    Oxycodone Urine Neg Negative <100 ng/ml    FENTANYL SCREEN, URINE POSITIVE (A) Negative <5 ng/mL    pH, UA 5.5     Drug Screen Comment: see below    Ethanol   Result Value Ref Range    Ethanol Lvl None Detected mg/dL   Ammonia   Result Value Ref Range    Ammonia 75 (H) 11 - 51 umol/L   Lipase   Result Value Ref Range    Lipase 30.0 13.0 - 60.0 U/L   hCG, serum, qualitative   Result Value Ref Range    hCG Qual Negative Detects HCG level >10 MIU/mL   Blood gas, venous   Result Value Ref Range    pH, Wood 7.334 (L) 7.350 - 7.450    pCO2, Wood 43.5 40.0 - 50.0 mmHg    pO2, Wood 86.5 (H) 25.0 - 40.0 mmHg    HCO3, Venous 23.2 23.0 - 29.0 mmol/L    Base Excess, Wood -2.7 -3.0 - 3.0 mmol/L    O2 Sat, Wood 98 Not Established %    Carboxyhemoglobin 4.8 (H) 0.0 - 1.5 %    MetHgb, Wood 0.4 <1.5 %    TC02 (Calc), Wood 55 Not Established mmol/L    O2 Content, Wood 16 Not Established VOL %    O2 Therapy Unknown    Microscopic Urinalysis   Result Value Ref Range    Bacteria, UA 3+ (A) None Seen /HPF    Hyaline Casts, UA 68 (H) 0 - 8 /LPF    WBC,  (H) 0 - 5 /HPF    RBC,  (H) 0 - 4 /HPF    Epithelial Cells, UA 25 (H) 0 - 5 /HPF    Hyaline Casts, UA Present (A) None Seen    Granular Casts, UA Present (A) None Seen    Calcium Oxalate Crystals Present (A) None Seen   EKG 12 Lead   Result Value Ref Range    Ventricular Rate 100 BPM    Atrial Rate 100 BPM    P-R Interval 140 ms    QRS Duration 82 ms    Q-T Interval 374 ms    QTc Calculation (Bazett) 482 ms    P Axis 61 degrees    R Axis 45 degrees    T Axis 5 degrees    Diagnosis       Normal sinus rhythmPossible Inferior infarct , age undeterminedAbnormal ECG       EKG performed in ED:  The 12 lead EKG was interpreted by me independent of a cardiologist as follows:  Rate: normal with a rate of 100  Rhythm: sinus  Axis: normal  Intervals: normal [place], narrow QRS, normal QTc  ST segments: no ST elevations or depressions  T waves: no abnormal inversions  Non-specific T wave changes: not present  Prior EKG comparison: EKG dated 2/8/23 is not significantly different    RADIOLOGY  Any applicable radiology studies including x-ray, CT, MRI, and/or ultrasound, were reviewed independently by me in addition to the radiologist.  I reviewed all radiology images and reports as well from this evaluation. CT HEAD WO CONTRAST    Result Date: 2/15/2023  Extensive hypodensities involving bilateral cerebellar hemispheres and bilateral globus pallidus highly suggestive of hypoxic ischemic injury. The extensive ischemic injury of the cerebellum is associated with obliteration of sulci and cistern and 4th ventricle and development of mild hydrocephalus with distension of temporal horns of the lateral ventricle. IR US GUIDED PARACENTESIS    Result Date: 2/9/2023  Successful paracentesis. CT CHEST ABDOMEN PELVIS WO CONTRAST Additional Contrast? None    Result Date: 2/15/2023  1. Chest CT demonstrates mild atelectatic changes but no parenchymal infiltrates or active pleural disease. Mediastinal structures appear unremarkable. 2. Liver cirrhosis also previously seen. 3. Moderate ascites as well as mesenteric congestion and edema also previously seen. Mild splenomegaly. 4. No acute gastrointestinal abnormality.  5. Severe anasarca also previously seen. ED COURSE/MDM  Patient seen and evaluated. Old records reviewed. Labs and imaging reviewed. After initial evaluation, differential diagnostic considerations included but not limited to: stroke, TIA, intracranial bleed, trauma, infection/sepsis, medication side effect, intoxication/withdrawal, metabolic/electrolyte abnormalities      The patient's ED workup was notable for multifactorial presentation today. Her initial reason for coming in was actually polysubstance abuse with a heroin overdose. She received prehospital Narcan and did not receive any additional Narcan here as she did not present with an ongoing opioid toxidrome although was still little confused. However I do feel that her encephalopathy may be multifactorial, with amphetamines also in her drug screen and in addition several potentially disorienting findings in her work-up here. She was found to be hyperammonemic with a history of cirrhosis and I did order her p.o. lactulose for this. She is found to have a mild CAT. However given her mild BNP elevation with anasarca and stable blood pressure I do feel that she would benefit more from IV Lasix and diuresis and she would from IV fluids at this time despite the lactate elevation of greater than 5. Without hypotension I do not feel that this is representative of septic shock. Additionally, she has a viral illness diagnosed today with a COVID-positive swab and this plus her polysubstance abuse may be contributing to her lactate elevation as well even though she does have a UTI. She was given Rocephin for the urinary tract infection although as noted above I do not feel that she is in septic shock currently and I am opting for Lasix and lieu of fluid resuscitation for now. Blood cultures are obtained. Patient's chest and belly CT scan are demonstrative of anasarca with cirrhotic changes but no acute surgical or intrathoracic process noted. Clinically SBP seems less likely. Rapid flu is negative. Given the mental status change I did consider intracranial bleed but a head CT was obtained to definitively rule this out. I considered an MRI of the brain however do not feel that this is emergently indicated in the ED. It may need to be performed as an inpatient though, as head CT does demonstrate notable findings concerning for anoxic brain injury which could have been due to prolonged downtime from her heroin overdose today. She does not have any focalizing neurologic symptoms to suggest acute ischemia and the pattern is not consistent with embolic disease. Is this patient to be included in the SEP-1 Core Measure? No   Exclusion criteria - the patient is NOT to be included for SEP-1 Core Measure due to:   Alternative explanation for abnormal labs/vitals that do not relate to sepsis, see MDM for further explanation  Although she has a lactate elevation and leukocytosis, she is afebrile, has known viral illness with COVID-19 and also polysubstance abuse which could be contributing    Consults:  None    History obtained from: Patient and EMS    Pertinent social determinants of health: Psychosocial condition (including substance abuse)    Chronic conditions potentially affecting care:  cirrhosis, obesity, polysub abuse    Review of other records:  Inpatient notes from previous visit at this facility from admission last week    Reassessment:  See MDM for details of medications given and reassessment    During the patient's ED course, the patient was given:  Medications   cefTRIAXone (ROCEPHIN) 1,000 mg in sodium chloride 0.9 % 50 mL IVPB (mini-bag) (has no administration in time range)   furosemide (LASIX) injection 80 mg (has no administration in time range)   lactulose (CHRONULAC) 10 GM/15ML solution 20 g (has no administration in time range)   methylPREDNISolone sodium (SOLU-MEDROL) injection 125 mg (125 mg IntraVENous Given 2/15/23 6555) ipratropium-albuterol (DUONEB) nebulizer solution 1 ampule (1 ampule Inhalation Given 2/15/23 1759)        CLINICAL IMPRESSION  1. Acute hypoxemic respiratory failure (HCC)    2. Opiate overdose, accidental or unintentional, initial encounter ([facility] [address]    3. COVID-19    4. Acute cystitis without hematuria    5. Anasarca    6. Lactic acidosis    7. Hyperammonemia ([facility] [address]    8. Polysubstance abuse ([facility] [address]    9. CAT (acute kidney injury) ([facility] [address]    10. Anoxic brain injury ([facility] [address]        Blood pressure 123/71, pulse 99, temperature 98.5 °F (36.9 °C), temperature source Oral, resp. rate 17, height 5' 6\" (1.676 m), weight (!) 355 lb (161 kg), SpO2 100 %, not currently breastfeeding. DISPOSITION  Papito Rosario was admitted in fair condition. The plan is to admit to the hospital at this time under the hospitalist service. Hospitalist accepted the patient and will take over the patient's care. Critical care time:  The total critical care time I independently spent while evaluating and treating this patient was 40 minutes. This excludes time spent doing separately billable procedures. This includes time at the bedside, data interpretation, medication management, obtaining critical history from collateral sources if the patient is unable to provide it directly, and physician consultation. Specifics of interventions taken and potentially life-threatening diagnostic considerations are listed above in the medical decision making. If this was a shared visit with an EBER, the time in this attestation is non-concurrent critical care time out of the total shared critical care time provided by the EBER and myself. DISCLAIMER: This chart was created using Dragon dictation software. Efforts were made by me to ensure accuracy, however some errors may be present due to limitations of this technology and occasionally words are not transcribed correctly.        Dre Briseno MD  02/15/23 Trina Schmitt

## 2023-02-16 NOTE — PROGRESS NOTES
ETT retracted to 22 cm at Lip from 26 cm at Lip based on chest XRAY. Bilateral BS heard. Adequate exhaled VT noted on vent. Repeat CXR ordered.

## 2023-02-16 NOTE — FLOWSHEET NOTE
Pt here for COVID and heroin overdose, request order for COWS.   Pt restless this evening.      02/16/23 0035   Treatment Team Notification   Reason for Communication Review case  (COWS order?)   Team Member Name Mukund Guido   Treatment Team Role Consulting Provider   Method of Communication Secure Message   Response Waiting for response   Notification Time 2098

## 2023-02-16 NOTE — PLAN OF CARE
Problem: Safety - Adult  Goal: Free from fall injury  2/16/2023 1242 by Terie Landau, RN  Outcome: Progressing  2/15/2023 2330 by John Crain, RN  Outcome: Progressing  Flowsheets (Taken 2/15/2023 2239)  Free From Fall Injury: Instruct family/caregiver on patient safety     Problem: ABCDS Injury Assessment  Goal: Absence of physical injury  2/16/2023 1242 by Terie Landau, RN  Outcome: Progressing  2/15/2023 2330 by John Crain, RN  Outcome: Progressing  Flowsheets (Taken 2/15/2023 2239)  Absence of Physical Injury: Implement safety measures based on patient assessment     Problem: Pain  Goal: Verbalizes/displays adequate comfort level or baseline comfort level  Outcome: Progressing  Flowsheets (Taken 2/16/2023 0011 by John Crain, RN)  Verbalizes/displays adequate comfort level or baseline comfort level: Encourage patient to monitor pain and request assistance     Problem: Discharge Planning  Goal: Discharge to home or other facility with appropriate resources  Outcome: Progressing  Flowsheets (Taken 2/16/2023 0600 by Becky Trammell RN)  Discharge to home or other facility with appropriate resources:   Identify barriers to discharge with patient and caregiver   Arrange for needed discharge resources and transportation as appropriate   Identify discharge learning needs (meds, wound care, etc)   Arrange for interpreters to assist at discharge as needed   Refer to discharge planning if patient needs post-hospital services based on physician order or complex needs related to functional status, cognitive ability or social support system     Problem: Safety - Medical Restraint  Goal: Remains free of injury from restraints (Restraint for Interference with Medical Device)  Description: INTERVENTIONS:  1. Determine that other, less restrictive measures have been tried or would not be effective before applying the restraint  2.  Evaluate the patient's condition at the time of restraint application  3. Inform patient/family regarding the reason for restraint  4.  Q2H: Monitor safety, psychosocial status, comfort, nutrition and hydration  Outcome: Progressing

## 2023-02-16 NOTE — PROGRESS NOTES
Pt arrived from [address] [place] after code blue event. Intubated (7.5 ETT, 26 on lips) with vent settings: AC/V: RR 18, TV: 450, FiO2 100%, & Peep 5. Central line placed by MD @ bedside. Levophed started @ 4 mcg/min for BP support, see MAR. Assessment complete/VS obtained. Pt is unresponsive/unable to follow commands. Pupils dilated 5/ Non-reactive to lights. Absent Gag reflex. Respirations regular/unlabored/tolerating vent. Inspiratory wheezes heard on bilateral lungs/diminished on lower lobes. NSR. Abdomen soft/rounded/obese with active bowel sounds. Peripheral pulses weak/palpable. Scattered bruising present all over body with multiple scabs on lower extremities. Mcrae draining/patent. Pt bathed/repositioned & incontinence care provided. Standard safety measures in place.

## 2023-02-16 NOTE — PROGRESS NOTES
Pt admitted to 5580 from ER, VSS, alert to self and place. Telemetry applied. Pt remains stable, falling asleep easily during assessment. Pt oriented to room and call light. Sister called for update. Verified her name in chart. Bed in lowest position, wheels locked and alarm on.  AVYSYS placed at bedside for pt safety.

## 2023-02-16 NOTE — H&P
Hospital Medicine History & Physical      PCP: Rosanna Vargas MD    Date of Admission: 2/15/2023    Date of Service: Pt seen/examined on 2/15/2023    Pt seen/examined face to face on and admitted as inpatient with expected LOS to be two days but can change depending on diagnostic work up and treatment response. Chief Complaint:    Chief Complaint   Patient presents with    Drug Overdose     Pt arrived via [facility] from home d/t heroin OD. Per squad pt responsive to sternal rub only. Pt got 8 mg Narcan via IN. Pt w liver cirrhosis         History Of Present Illness:      51 y.o. female who presented to [facility] with Past medical history of asthma, COPD, depression, RSD presented to the ED with chief complaint of drug overdose    Patient is encephalopathic this limited history. Patient currently does not have any complaints except that she has been taking heroin OD and has been having worsening abdominal distention no known alleviating systemic factor no associated fever chills nausea vomiting abdominal pain or dysuria. Patient does report has been coughing and shortness of breath but does not exactly know exactly when and for how long. Past Medical History:          Diagnosis Date    Anxiety     Arthritis     Asthma     COPD (chronic obstructive pulmonary disease) ([facility] [address] 6/3/2022    Depression     lost 2 children, apx 2012. RSD lower limb     right foot       Past Surgical History:          Procedure Laterality Date    ANKLE FRACTURE SURGERY      CARPAL TUNNEL RELEASE       SECTION      CHOLECYSTECTOMY      KNEE ARTHROSCOPY Bilateral     OVARY REMOVAL Right     TOTAL ANKLE ARTHROPLASTY      TUBAL LIGATION         Medications Prior to Admission:      Prior to Admission medications    Medication Sig Start Date End Date Taking?  Authorizing Provider   spironolactone (ALDACTONE) 100 MG tablet Take 1 tablet by mouth daily 23   Robbie Blakely DO   furosemide (LASIX) 40 MG tablet Take 1 tablet by mouth daily 2/10/23   Neha Marquez DO   cefdinir (OMNICEF) 300 MG capsule Take 1 capsule by mouth 2 times daily for 10 days 2/10/23 2/20/23  Neha Marquez DO   gabapentin (NEURONTIN) 400 MG capsule Take 400 mg by mouth 3 times daily. Historical Provider, MD   albuterol sulfate HFA (VENTOLIN HFA) 108 (90 Base) MCG/ACT inhaler Inhale 2 puffs into the lungs 4 times daily as needed for Wheezing 1/10/23   Quiana Putnam MD   gabapentin (NEURONTIN) 400 MG capsule TAKE ONE CAPSULE BY MOUTH THREE TIMES A DAY 11/21/22 1/10/23  Quiana Putnam MD   amitriptyline (ELAVIL) 50 MG tablet Take 1 tablet by mouth nightly  Patient not taking: Reported on 2/8/2023 9/22/22   Quiana Putnam MD   amLODIPine (NORVASC) 2.5 MG tablet Take 1 tablet by mouth daily  Patient not taking: Reported on 2/8/2023 9/22/22   Quiana Putnam MD   Compression Stockings MISC by Does not apply route 9/1/22   Quiana Putnam MD   busPIRone (BUSPAR) 5 MG tablet Take 1 tablet by mouth 2 times daily 9/1/22   Quiana Putnam MD   venlafaxine (EFFEXOR XR) 150 MG extended release capsule Take 1 capsule by mouth daily  Patient not taking: Reported on 2/8/2023 9/1/22   Quiana Putnam MD   umeclidinium-vilanterol (ANORO ELLIPTA) 62.5-25 MCG/INH AEPB inhaler Inhale 1 puff into the lungs daily 6/3/22   Quiana Putnam MD   diclofenac (VOLTAREN) 50 MG EC tablet Take 1 tablet by mouth 3 times daily (with meals)  Patient not taking: Reported on 2/8/2023 5/17/22   Quiana Putnam MD       Allergies:  Darvocet a500 [propoxyphene n-acetaminophen] and Penicillins    Social History:          TOBACCO:   reports that she has been smoking cigarettes. She started smoking about 36 years ago. She has a 17.50 pack-year smoking history. She has never used smokeless tobacco.  ETOH:   reports no history of alcohol use.   E-cigarette/Vaping       Questions Responses    E-cigarette/Vaping Use Never User    Start Date     Passive Exposure Quit Date     Counseling Given     Comments               Family History:      Family History reviewed with patient, and does not pertain and non-contributory to the current illness        Problem Relation Age of Onset    Depression Mother     Anxiety Disorder Mother     Arthritis Other     Asthma Other     Cancer Other     Diabetes Other     High Blood Pressure Other     High Blood Pressure Sister     Depression Sister     Anxiety Disorder Sister     Heart Disease Maternal Grandfather        REVIEW OF SYSTEMS:     Constitutional:  No Fever, No Chills, No Night Sweats  ENT/Mouth:  No Nasal Congestion,  No Hoarseness, No new mouth lesion  Eyes:  No Eye Pain, No Redness, No Discharge  Cardiovascular:  No Chest Pain, No Orthopnea, No Palpitations  Respiratory:  + Cough, + Sputum, No Dyspnea  Gastrointestinal: No Vomiting, No Diarrhea, No abdominal pain  Genitourinary: No Urinary Frequency, No Hematuria, No Urinary pain  Musculoskeletal:  No worsening Arthralgias, No worsening Myalgias  Skin:  No new Skin Lesions, No new skin rash  Neuro:  No new weakness, No new numbness. Psych:  No suicial ideation, No Violence ideation    PHYSICAL EXAM PERFORMED:    BP (!) 147/97   Pulse 88   Temp 98.5 °F (36.9 °C) (Oral)   Resp 14   Ht 5' 6\" (1.676 m)   Wt (!) 355 lb (161 kg)   SpO2 100%   BMI 57.30 kg/m²     General appearance:  mild acute distress, appears older than stated age  HEENT:   atraumatic, sclera anicteric, Conjunctivae clear. Neck: Supple,Trachea midline, no goiter  Respiratory:minimal accessory muscle usage, Normal respiratory effort. Clear to auscultation, bilaterally without wheezing  Cardiovascular:  Regular rate and rhythm, capillary refill 2 seconds  Abdomen: Soft, non-tender, non-distended with normal bowel sounds. Musculoskeletal:  No clubbing, cyanosis. trace edema LE bilaterally. Skin: turgor normal.  No new rashes or lesions.   Neurologic: Alert and oriented x2, no new focal sensory/motor deficits. Asterixis present    Labs:     Recent Labs     02/15/23  1654   WBC 11.6*   HGB 12.5   HCT 39.0        Recent Labs     02/15/23  1654      K 4.8   CL 99   CO2 20*   BUN 9   CREATININE 1.3*   CALCIUM 8.4     Recent Labs     02/15/23  1654   *   ALT 36   BILITOT 1.1*   ALKPHOS 209*     Recent Labs     02/15/23  1654   INR 1.52*     Recent Labs     02/15/23  1654   CKTOTAL 485*   TROPONINI 0.07*  0.06*       Urinalysis:      Lab Results   Component Value Date/Time    NITRU Negative 02/15/2023 06:24 PM    WBCUA 202 02/15/2023 06:24 PM    BACTERIA 3+ 02/15/2023 06:24 PM    RBCUA 100 02/15/2023 06:24 PM    BLOODU LARGE 02/15/2023 06:24 PM    SPECGRAV 1.015 02/15/2023 06:24 PM    GLUCOSEU 100 02/15/2023 06:24 PM       Radiology:       EKG:  I have reviewed the EKG with the following interpretation:   Normal sinus rhythm  CT CHEST ABDOMEN PELVIS WO CONTRAST Additional Contrast? None   Final Result   1. Chest CT demonstrates mild atelectatic changes but no parenchymal   infiltrates or active pleural disease. Mediastinal structures appear   unremarkable. 2. Liver cirrhosis also previously seen. 3. Moderate ascites as well as mesenteric congestion and edema also   previously seen. Mild splenomegaly. 4. No acute gastrointestinal abnormality. 5. Severe anasarca also previously seen. CT HEAD WO CONTRAST   Final Result   Extensive hypodensities involving bilateral cerebellar hemispheres and   bilateral globus pallidus highly suggestive of hypoxic ischemic injury. The extensive ischemic injury of the cerebellum is associated with   obliteration of sulci and cistern and 4th ventricle and development of mild   hydrocephalus with distension of temporal horns of the lateral ventricle.              ASSESSMENT AND PLAN:    Active Hospital Problems    Diagnosis Date Noted    Acute respiratory failure with hypoxia ([facility] [address] [J96.01] 02/15/2023     Priority: Medium     Acute encephalopathy,  Suspected hepatic  CT head negative  Lactulose ordered    Acute hypoxic respiratory failure:  Secondary to pneumonia and COVID-19  Responding well to nasal cannula  Wean as tolerated    Pneumonia:  Elevated Pro-Luis  Although suspected to be viral COVID-pneumonia, elevated Pro-Luis could be explained with patient being in CAT  We will treat empirically Doxy and ceftriaxone as community-acquired  Respiratory cultures pending  Inflammatory markers pending for COVID-19  Steroids given drainage hypoxic    Decompensated cirrhosis  Abdominal distention, ascites, hepatic encephalopathy  GI consulted, much appreciated    Acute renal failure,  IVF  Nephrology consulted, much appreciated    NSTEMI: Type II in the setting of infection  And drug abuse  Continue to trend    Macrocytosis: B12 folate were normal last checked last week  This is likely secondary to cirrhosis    Acute cystitis:  Antibiotic as above    Drug abuse: Education counseling          Face-to-face discussion with the primary ER physician in regards to symptoms, history, physical exam, diagnosis and treatment, collaborative decision was to admit the patient.     Diet: NPO except meds ordered    DVT Prophylaxis: Heparin    Dispo:   Expected LOS of two days         Oshea Amanda, DO

## 2023-02-16 NOTE — CONSULTS
Palliative Care:        Drug Overdose (Pt arrived via [facility] from home d/t heroin OD. Per squad pt responsive to sternal rub only. Pt got 8 mg Narcan via IN. Pt w liver cirrhosis )    H&P: 51 y.o. female who presented to [facility] with Past medical history of asthma, COPD, depression, RSD presented to the ED with chief complaint of drug overdose. Patient is encephalopathic this limited history. Patient currently does not have any complaints except that she has been taking heroin OD and has been having worsening abdominal distention no known alleviating systemic factor no associated fever chills nausea vomiting abdominal pain or dysuria. Patient does report has been coughing and shortness of breath but does not exactly know exactly when and for how long. Admit: 2/15/2023  Consult: 23    Past Medical History:   has a past medical history of Anxiety, Arthritis, Asthma, COPD (chronic obstructive pulmonary disease) ([address] Depression, and RSD lower limb. Past Surgical History:   has a past surgical history that includes  section; Ovary removal (Right); Carpal tunnel release; Tubal ligation (); Total ankle arthroplasty; Ankle fracture surgery; Knee arthroscopy (Bilateral); and Cholecystectomy.     Advance Directives:        Advance Care Planning   The patient has the following advanced directives on file:  Advance Directives       Power of [address] Will ACP-Advance Directive ACP-Power of     Not on File Not on File Not on File Not on File     The patient has appointed the following active healthcare agents:    Primary Decision Maker: Celestine Sever - Spouse - [phone]    Secondary Decision Maker: Cherylene Rover Child - [phone]    Secondary Decision Maker: Jenny Galvez - Child    Supplemental (Other) Decision Maker: Kim Josue - Parent - [phone]    The Patient has the following current code status:    Code Status: Full Code     Extended Emergency Contact Information  Primary Emergency Contact: Mauro Thomas [address] Phone: [phone]  Mobile Phone: [phone]  Relation: Brother/Sister    Problem Severity: Pain/Other Symptoms:        Weight 346#  Body mass index is 55.94 kg/m². Versed 2 mg; Levo 4 mcg    Bed Mobility/Toileting/Transfer:        No PT/OT notes - intubated since arrival    Performance Status:        Palliative Performance Scale:  30%   [] Bed Bound; Extensive disease; Total care; intake reduced; LOC full/confusion  20%   [] Bed Bound; Extensive disease; Total care; intake minimal; Drowsy/coma  10%   [x] Bed Bound; Extensive disease; Total care; Mouth care only; Drowsy/coma  PPS 10%    Symptom Assessment: Appetite/Nausea/Bowels/Fatigue:        Diet NPO Exceptions are: Ice Chips, Sips of Water with Meds    Intake/Output Summary (Last 24 hours) at 2/16/2023 1113  Last data filed at 2/16/2023 0502  Gross per 24 hour   Intake 50 ml   Output 2900 ml   Net -2850 ml     Social History:   reports that she has been smoking cigarettes. She started smoking about 36 years ago. She has a 17.50 pack-year smoking history. She has never used smokeless tobacco. She reports current drug use. Drug: Opiates . She reports that she does not drink alcohol. Family History:  family history includes Anxiety Disorder in her mother and sister; Arthritis in an other family member; Asthma in an other family member; Cancer in an other family member; Depression in her mother and sister; Diabetes in an other family member; Heart Disease in her maternal grandfather; High Blood Pressure in her sister and another family member. Family Discussion:        All MD/RN notes and personal interaction indicate that pt is not currently capable of independent decision making d/t intubation, sedation, and encephalopathy.     Advance Care Planning  (ACP) Conversation    Date of ACP Conversation: 02/16/23    Conversation Conducted with:    Healthcare Decision Maker: Next of Kin by law (only applies in absence of above) (name) pt's , Reino Landau, with the support of pt's two sons, Paco Cabrera and Ahmet Vaca, pt's mom, Gena Luo, and pt's aunts, Gilmer Mccormick and Ranjith Posada. *If present, Decision Maker was asked to consider and make decisions based on patient values, known preferences, or best interests. Current Designated Health Care Decision Maker:     Primary Decision Maker: Gerri Hewitt - Spouse - [phone]    Secondary Decision Maker: Adama Morales Child - [phone]    Secondary Decision Maker: Kelsea Burton - Child    Supplemental (Other) Decision Maker: Wicho Jauregui - Parent - [phone]     If no Decision Maker listed in formal documents, then:  Joanne default to (per [place] statute):  Spouse? Yes; no legal separation or divorce, legally  to Reino Landau  Adult children? Two sons, Paco Cabrera and Ahmet Vaca  Living parents? Yes, mom Gena Luo and dad    For below questions, when conducting conversation with Joanne, substitute \"she\" and \"her\" for \"you\" and \"your\". Hospitalization: \"If your health were to worsen after you left the hospital and it became clear that your chance of recovery was unlikely, would you want to return to the hospital? Yes, pt would want to return to the hospital.    Ventilation:  Irrelevant - pt currently intubated    \"If your health got worse and it became clear that your chance of recovery was unlikely, would you still want to use a ventilator (breathing machine)? \" No, pt would not want to continue to use a ventilator via trach. Resuscitation:  \"CPR works best to restart the heart when there is a sudden event, like a heart attack, in someone who is otherwise healthy. Unfortunately, CPR does not typically restart the heart for people who have serious health conditions or who are very sick. In the event your heart stopped and you , would you want attempts to restart your heart: chest compressions, shock, ventilator, and medications?  Family requested time to discuss. \"If your health got worse and it became clear that your chance of recovery was unlikely, would you still want CPR? \" No, if pt got worse, pt would not want to receive CPR - no compressions, no shock, no intubation, no medications. Content Summary:  Spoke with multiple family members including pt's , Helio Malhotra, sons Lindalou Gottron and Donna Skelton, and mom, Rodrick Clayton. Discussed pt's current status, POC, and prognosis. Discussed situation that brought pt in and pt's social and medical history. Discussed legal next of kin hierarchy and updated EMR. Family had many questions but remained reasonable and appropriate throughout. Action Overview: Additional topics of conversation included treatment goals, benefit/burden of treatment options, ventilation preferences, hospitalization preferences, resuscitation preferences, and hospice care      Will follow up and support patient/family as time allows or circumstances dictate. Please reach out via [id number], Taktio, or email [email or web address] if pt condition changes significantly or if family requests support. Thank you.     Electronically signed by Dorma Collet, RN, BSN on 2/17/2023 at 8:56 AM

## 2023-02-16 NOTE — CONSULTS
Office : [phone]     Fax :[phone]       Nephrology Consult Note      Patient's Name: Yancy Smart  9:33 AM  2023    Reason for Consult: CAT      Requesting Physician:  Pavan Montalvo MD      Chief Complaint:    Chief Complaint   Patient presents with    Drug Overdose     Pt arrived via [facility] from home d/t heroin OD. Per squad pt responsive to sternal rub only. Pt got 8 mg Narcan via IN. Pt w liver cirrhosis            History of Present iIlness:    Yancy Smart is a 51 y.o. female with prior history of asthma, COPD, depression, presented to the ED with chief complaint of drug overdose   she is intubated at this time and all history from medical records         I/O last 3 completed shifts: In: 48 [IV Piggyback:50]  Out: 5 [Urine:2900]    Past Medical History:   Diagnosis Date    Anxiety     Arthritis     Asthma     COPD (chronic obstructive pulmonary disease) ([facility] [address] 6/3/2022    Depression     lost 2 children, apx 2012. RSD lower limb     right foot       Past Surgical History:   Procedure Laterality Date    ANKLE FRACTURE SURGERY      CARPAL TUNNEL RELEASE       SECTION      CHOLECYSTECTOMY      KNEE ARTHROSCOPY Bilateral     OVARY REMOVAL Right     TOTAL ANKLE ARTHROPLASTY      TUBAL LIGATION         Family History   Problem Relation Age of Onset    Depression Mother     Anxiety Disorder Mother     Arthritis Other     Asthma Other     Cancer Other     Diabetes Other     High Blood Pressure Other     High Blood Pressure Sister     Depression Sister     Anxiety Disorder Sister     Heart Disease Maternal Grandfather         reports that she has been smoking cigarettes. She started smoking about 36 years ago.  She has a 17.50 pack-year smoking history. She has never used smokeless tobacco. She reports current drug use. Drug: Opiates . She reports that she does not drink alcohol.         Allergies:  Darvocet a500 [propoxyphene n-acetaminophen] and Penicillins    Current Medications:    norepinephrine (LEVOPHED) 16 mg in sodium chloride 0.9 % 250 mL infusion, Continuous  midazolam (VERSED) 1 mg/mL in NS infusion, Continuous  doxycycline (VIBRAMYCIN) 100 mg in sodium chloride 0.9 % 100 mL IVPB, Q12H  metronidazole (FLAGYL) 500 mg in 0.9% NaCl 100 mL IVPB premix, Q8H  lactulose enema, Q6H  sodium chloride flush 0.9 % injection 10 mL, 2 times per day  sodium chloride flush 0.9 % injection 10 mL, PRN  0.9 % sodium chloride infusion, PRN  potassium chloride 10 mEq/100 mL IVPB (Peripheral Line), PRN  magnesium sulfate 2000 mg in 50 mL IVPB premix, PRN  promethazine (PHENERGAN) tablet 12.5 mg, Q6H PRN   Or  ondansetron (ZOFRAN) injection 4 mg, Q6H PRN  guaiFENesin-dextromethorphan (ROBITUSSIN DM) 100-10 MG/5ML syrup 5 mL, Q4H PRN  dexamethasone (DECADRON) (PF) 10 mg/mL injection, Q24H  acetaminophen (TYLENOL) tablet 650 mg, Q6H PRN   Or  acetaminophen (TYLENOL) suppository 650 mg, Q6H PRN  heparin (porcine) injection 5,000 Units, 3 times per day  cefTRIAXone (ROCEPHIN) 1,000 mg in sodium chloride 0.9 % 50 mL IVPB (mini-bag), Q24H        Review of Systems:   14 point ROS obtained but were negative except mentioned in HPI      Physical exam:     Vitals:  BP (!) 105/58   Pulse 73   Temp 96.8 °F (36 °C) (Temporal)   Resp 18   Ht 5' 6\" (1.676 m)   Wt (!) 346 lb 9.6 oz (157.2 kg)   SpO2 100%   BMI 55.94 kg/m²   Constitutional:  OAA X3 NAD  Skin: no rash, turgor wnl  Heent:  eomi, mmm  Neck: no bruits or jvd noted  Cardiovascular:  S1, S2 without m/r/g  Respiratory: CTA B without w/r/r  Abdomen:  +bs, soft, nt, nd  Ext: mild  lower extremity edema  Psychiatric: mood and affect appropriate  Musculoskeletal:  Rom, muscular strength intact    Labs:  CBC:   Recent Labs 02/15/23  1654 02/16/23  0351 02/16/23 0425   WBC 11.6*  --  8.9   HGB 12.5 14.5 11.7*     --  97*     BMP:    Recent Labs     02/15/23  1654 02/16/23 0425    139   K 4.8 3.6   CL 99 104   CO2 20* 22   BUN 9 13   CREATININE 1.3* 1.3*   GLUCOSE 116* 157*     Ca/Mg/Phos:   Recent Labs     02/15/23  1654 02/16/23  0425   CALCIUM 8.4 8.2*     Hepatic:   Recent Labs     02/15/23  1654 02/16/23  0425   * 143*   ALT 36 47*   BILITOT 1.1* 0.9   ALKPHOS 209* 179*     Troponin:   Recent Labs     02/15/23  1654 02/16/23  0425   TROPONINI 0.07*  0.06* 0.06*     BNP: No results for input(s): BNP in the last 72 hours. Lipids: No results for input(s): CHOL, TRIG, HDL, LDLCALC, LABVLDL in the last 72 hours. ABGs:   Recent Labs     02/16/23  0847   PHART 7.333*   PO2ART 83.4   SBN6TPA 48.0*     INR:   Recent Labs     02/15/23  1654   INR 1.52*     UA:  Recent Labs     02/15/23  1824   COLORU DARK YELLOW*   CLARITYU TURBID*   GLUCOSEU 100*   BILIRUBINUR SMALL*   KETUA Negative   SPECGRAV 1.015   BLOODU LARGE*   PHUR 5.5  5.5   PROTEINU 300   UROBILINOGEN 1.0   NITRU Negative   LEUKOCYTESUR MODERATE*   LABMICR YES   URINETYPE NotGiven      Urine Microscopic:   Recent Labs     02/15/23  1824   BACTERIA 3+*   HYALCAST 68*  Present*   WBCUA 202*   RBCUA 100*   EPIU 25*     Urine Culture: No results for input(s): LABURIN in the last 72 hours. Urine Chemistry: No results for input(s): Lupe Quitter, PROTEINUR, NAUR in the last 72 hours. IMAGING:  XR CHEST PORTABLE   Final Result   1. Endotracheal tube terminates in the proximal right knee mainstem   bronchus, for which retraction 4-5 cm is recommended. 2.  Right internal jugular line terminates at the level of the distal SVC. 3.  Bilateral airspace disease appears more prominent in the interval,   notably in the right upper lobe and right lung base.          CT CHEST ABDOMEN PELVIS WO CONTRAST Additional Contrast? None   Final Result 1. Chest CT demonstrates mild atelectatic changes but no parenchymal   infiltrates or active pleural disease. Mediastinal structures appear   unremarkable. 2. Liver cirrhosis also previously seen. 3. Moderate ascites as well as mesenteric congestion and edema also   previously seen. Mild splenomegaly. 4. No acute gastrointestinal abnormality. 5. Severe anasarca also previously seen. CT HEAD WO CONTRAST   Final Result   Extensive hypodensities involving bilateral cerebellar hemispheres and   bilateral globus pallidus highly suggestive of hypoxic ischemic injury. The extensive ischemic injury of the cerebellum is associated with   obliteration of sulci and cistern and 4th ventricle and development of mild   hydrocephalus with distension of temporal horns of the lateral ventricle. XR CHEST PORTABLE    (Results Pending)                       Assessment/Plan :      1.  Donell 2/2 ATN from hypotension   Recommend to dose adjust all medications  based on renal functions  Maintain SBP> 90 mmHg   Daily weights   AVOID NSAIDs  Avoid Nephrotoxins  Monitor Intake/Output  Call if significant decrease in urine output      2. Acute respiratory failure   Pneumonia , COVID 19 infection   Intubated   Has edema   Will give lasix iv     3. Drug abuse     4.  Lactic acidosis   Iv fluids   Recheck levels     Monitor closlsly             D/w primary team      Thank you for allowing us to participate in care of Mali Cruz         Electronically signed by: Jaspal Corona MD, 2/16/2023, 9:33 AM      [facility] [address]  Office : [phone]  Fax :[phone]

## 2023-02-16 NOTE — PROGRESS NOTES
Patient seen and examined independently. Labs chart and imaging reviewed. Agree with H&P as documented by my colleague. Family requested meeting discussed plan of care. I reviewed the case with family as well as the prognostic indications that we have. They agreeable to current plan of care and are aware of the patient's severely guarded prognosis.

## 2023-02-16 NOTE — SIGNIFICANT EVENT
[id number] 3A called this RN to state pt HR in the 20s, this RN and charge RN went to pt room and pt blue around the lips and found to have no pulse, CPR initiated and code blue called. 4071 Dr Griselda Ahle at the bedside    4346 Narcan given and First dose of epi given    0339 Femoral pulse found with doppler and sinus tach on monitor. 0340 Atropine given    0342 Intubation performed by Dr Griselda Ahle and RT and positive skin color change noted    0343 ABG ordered, hr 132, BP 92/56. O2 94% and mechanical ventilation occurring, blood sugar noted at 140    0345 Bolus of NS ordered, BP 88/56 O2 95% hr 124 02 100% and being mechanically ventilated. 0347 Bolus started, BP 69/46, , spo2 100% and mechanical ventilation. Dr Griselda Ahle ready to move pt to ICU.     0348 BP 42/37, hr 118, SPO2 100% and mechanical ventilation.       0350 Pt left floor for ICU

## 2023-02-16 NOTE — PLAN OF CARE
Problem: Safety - Adult  Goal: Free from fall injury  Outcome: Progressing  Flowsheets (Taken 2/15/2023 2239)  Free From Fall Injury: Instruct family/caregiver on patient safety     Problem: ABCDS Injury Assessment  Goal: Absence of physical injury  Outcome: Progressing  Flowsheets (Taken 2/15/2023 2239)  Absence of Physical Injury: Implement safety measures based on patient assessment

## 2023-02-16 NOTE — PROGRESS NOTES
4 Eyes Skin Assessment     NAME:  Liss Rosario  YOB: 1975  MEDICAL RECORD NUMBER:  [id number]    The patient is being assessed for  Admission    I agree that One RN has performed a thorough Head to Toe Skin Assessment on the patient. ALL assessment sites listed below have been assessed. Areas assessed by both nurses:    Head, Face, Ears, Shoulders, Back, Chest, Arms, Elbows, Hands, Sacrum. Buttock, Coccyx, Ischium, and Legs. Feet and Heels        Does the Patient have a Wound?  No noted wound(s) (Redness/tears on folds)       Sean Prevention initiated by RN: Yes   Wound Care Orders initiated by RN: No    Pressure Injury (Stage 3,4, Unstageable, DTI, NWPT, and Complex wounds) if present, place referral order by RN under : No    New and Established Ostomies, if present place, referral order under : NA      Nurse 1 eSignature: Electronically signed by Kiley Harper RN on 2/16/23 at 8:26 AM EST    **SHARE this note so that the co-signing nurse can place an eSignature**    Nurse 2 eSignature: Electronically signed by Nieves Guzmán RN on 2/16/23 at 9:36 PM EST

## 2023-02-16 NOTE — PROGRESS NOTES
Pt family at bedside, requested to talk to MD concerning current and future plan of care. Perfectserved Dr Page Browning about pt family request, waiting for response.

## 2023-02-16 NOTE — CONSULTS
PULMONARY AND CRITICAL CARE MEDICINE CONSULTATION NOTE    CONSULTING PHYSICIAN:  Savannah Roberto DO    ADMISSION DATE: 2/15/2023  ADMISSION DIAGNOSIS: Lactic acidosis [E87.20]  Anasarca [R60.1]  Hyperammonemia (HCC) [E72.20]  Anoxic brain injury ([facility] [G93.1]  Polysubstance abuse ([facility] [F19.10]  CAT (acute kidney injury) ([facility] [N17.9]  Acute cystitis without hematuria [N30.00]  Acute respiratory failure with hypoxia ([facility] [J96.01]  Opiate overdose, accidental or unintentional, initial encounter ([facility] [T40.601A]  Acute hypoxemic respiratory failure ([facility] [J96.01]  COVID-19 [U07.1]    REASON FOR CONSULT:   Chief Complaint   Patient presents with    Drug Overdose     Pt arrived via  EMS from home d/t heroin OD. Per squad pt responsive to sternal rub only. Pt got 8 mg Narcan via IN. Pt w liver cirrhosis        DATE OF CONSULT: 2/15/2023    HISTORY OF PRESENT ILLNESS: 51y.o. year old female with a past medical history significant for COPD, depression, polysubstance abuse presented to the hospital with drug overdose. Apparently patient's roommate called EMS as he felt the patient has overdosed on opioid medications. Apparently patient has been living with a roommate who is himself a drug user. Patient has been using both heroin as well as methamphetamine. She has a history of liver cirrhosis. In the ER patient was found to be lethargic but was able to provide limited history. Apparently she has been having worsening abdominal distention. In the recent past has undergone paracentesis. She denied any shortness of breath, cough or discolored expectoration. She was given Narcan by the EMS in route to the hospital.  She was initially admitted to the medical floor. She was found unresponsive on the medical floors and had to be intubated for airway protection. Thereafter she was transferred to ICU. At the time of my evaluation she is lying in bed in no apparent distress.   Currently on full mechanical ventilatory support. Remains on Versed gtt. to maintain synchrony with the ventilator. Levophed running at 6 mcg/min. Her rapid COVID test also came back positive. REVIEW OF SYSTEMS:   14 point ROS could not be obtained due to patient factors. Intubated and sedated. PAST MEDICAL HISTORY:   Past Medical History:   Diagnosis Date    Anxiety     Arthritis     Asthma     COPD (chronic obstructive pulmonary disease) ([facility] 6/3/2022    Depression     lost 2 children, apx 2012. RSD lower limb     right foot       PAST SURGICAL HISTORY:   Past Surgical History:   Procedure Laterality Date    ANKLE FRACTURE SURGERY      CARPAL TUNNEL RELEASE       SECTION      CHOLECYSTECTOMY      KNEE ARTHROSCOPY Bilateral     OVARY REMOVAL Right     TOTAL ANKLE ARTHROPLASTY      TUBAL LIGATION         SOCIAL HISTORY:   Social History     Tobacco Use    Smoking status: Every Day     Packs/day: 0.50     Years: 35.00     Pack years: 17.50     Types: Cigarettes     Start date:     Smokeless tobacco: Never   Vaping Use    Vaping Use: Never used   Substance Use Topics    Alcohol use: No     Comment: 1x a year maybe     Drug use: Yes     Types: Opiates      Comment: in past with death of children        FAMILY HISTORY:   Family History   Problem Relation Age of Onset    Depression Mother     Anxiety Disorder Mother     Arthritis Other     Asthma Other     Cancer Other     Diabetes Other     High Blood Pressure Other     High Blood Pressure Sister     Depression Sister     Anxiety Disorder Sister     Heart Disease Maternal Grandfather        MEDICATIONS:     No current facility-administered medications on file prior to encounter.      Current Outpatient Medications on File Prior to Encounter   Medication Sig Dispense Refill    spironolactone (ALDACTONE) 100 MG tablet Take 1 tablet by mouth daily 30 tablet 3    furosemide (LASIX) 40 MG tablet Take 1 tablet by mouth daily 30 tablet 0    cefdinir (OMNICEF) 300 MG capsule Take 1 capsule by mouth 2 times daily for 10 days 20 capsule 0    gabapentin (NEURONTIN) 400 MG capsule Take 400 mg by mouth 3 times daily.       albuterol sulfate HFA (VENTOLIN HFA) 108 (90 Base) MCG/ACT inhaler Inhale 2 puffs into the lungs 4 times daily as needed for Wheezing 54 g 1    gabapentin (NEURONTIN) 400 MG capsule TAKE ONE CAPSULE BY MOUTH THREE TIMES A DAY 90 capsule 0    amitriptyline (ELAVIL) 50 MG tablet Take 1 tablet by mouth nightly (Patient not taking: Reported on 2/8/2023) 90 tablet 1    amLODIPine (NORVASC) 2.5 MG tablet Take 1 tablet by mouth daily (Patient not taking: Reported on 2/8/2023) 90 tablet 1    Compression Stockings MISC by Does not apply route 1 each 0    busPIRone (BUSPAR) 5 MG tablet Take 1 tablet by mouth 2 times daily 60 tablet 0    venlafaxine (EFFEXOR XR) 150 MG extended release capsule Take 1 capsule by mouth daily (Patient not taking: Reported on 2/8/2023) 30 capsule 2    umeclidinium-vilanterol (ANORO ELLIPTA) 62.5-25 MCG/INH AEPB inhaler Inhale 1 puff into the lungs daily 1 each 2    diclofenac (VOLTAREN) 50 MG EC tablet Take 1 tablet by mouth 3 times daily (with meals) (Patient not taking: Reported on 2/8/2023) 60 tablet 3        doxycycline (VIBRAMYCIN) IV  100 mg IntraVENous Q12H    metroNIDAZOLE  500 mg IntraVENous Q8H    lactulose enema   Rectal Q6H    furosemide  20 mg IntraVENous Daily    sodium chloride flush  10 mL IntraVENous 2 times per day    dexamethasone  6 mg IntraVENous Q24H    heparin (porcine)  5,000 Units SubCUTAneous 3 times per day    cefTRIAXone (ROCEPHIN) IV  1,000 mg IntraVENous Q24H      norepinephrine 4 mcg/min (02/16/23 1237)    midazolam 2 mg/hr (02/16/23 0845)    sodium chloride 5 mL/hr at 02/16/23 0555     sodium chloride flush, sodium chloride, potassium chloride, magnesium sulfate, promethazine **OR** ondansetron, guaiFENesin-dextromethorphan, acetaminophen **OR** acetaminophen      ALLERGIES:   Allergies as of 02/15/2023 - Fully Reviewed 02/15/2023   Allergen Reaction Noted    Darvocet a500 [propoxyphene n-acetaminophen] Hives 10/16/2013    Penicillins Hives 10/16/2013      OBJECTIVE:   height is 5' 6\" (1.676 m) and weight is 346 lb 9.6 oz (157.2 kg) (abnormal). Her temporal temperature is 97.2 °F (36.2 °C). Her blood pressure is 91/64 and her pulse is 75. Her respiration is 18 and oxygen saturation is 95%. No intake/output data recorded. PHYSICAL EXAM:    CONSTITUTIONAL: She is a 51y.o.-year-old who appears her stated age. She is intubated and sedated. HEENT: PERRLA, EOMI. No scleral icterus. No thrush, atraumatic, normocephalic. NECK: Supple, without cervical or supraclavicular lymphadenopathy:  CARDIOVASCULAR: S1 S2 RRR. Without murmer. No JVD or hepatojugular reflux seen. RESPIRATORY & CHEST: Bibasilar decreased breath sounds. No wheezing heard. GASTROINTESTINAL & ABDOMEN: Soft, nontender, positive bowel sounds in all quadrants, non-distended, without hepatosplenomegaly. GENITOURINARY: No gross anatomical abnormalities seen. MUSCULOSKELETAL: No tenderness to palpation of the axial skeleton. There is no clubbing. No cyanosis. No edema of the lower extremities. SKIN OF BODY: No rash or jaundice. PSYCHIATRIC EVALUATION: Unable to assess. HEMATOLOGIC/LYMPHATIC/ IMMUNOLOGIC: No palpable lymphadenopathy. NEUROLOGIC: Intubated and sedated.      LABS:  Recent Labs     02/15/23  1654 02/16/23  0351 02/16/23  0425   WBC 11.6*  --  8.9   HGB 12.5 14.5 11.7*   HCT 39.0  --  36.0     --  97*   ALT 36  --  47*   *  --  143*     --  139   K 4.8  --  3.6   CL 99  --  104   CREATININE 1.3*  --  1.3*   BUN 9  --  13   CO2 20*  --  22   INR 1.52*  --   --        Recent Labs     02/15/23  1654 02/16/23  0425   GLUCOSE 116* 157*   CALCIUM 8.4 8.2*    139   K 4.8 3.6   CO2 20* 22   CL 99 104   BUN 9 13   CREATININE 1.3* 1.3*       Recent Labs     02/16/23  0351 02/16/23  0847   PHART 7.062* 7.333*   VAK1WAJ 70.3* 48.0*   PO2ART 182.7* 83.4   AYX8FDQ 20.0* 25.5   A5IVLSGB 99 96.5   BEART -10* -0.8       Lab Results   Component Value Date    INR 1.52 (H) 02/15/2023    INR 1.39 (H) 02/08/2023    INR 1.28 (H) 08/01/2022    PROTIME 18.3 (H) 02/15/2023    PROTIME 17.0 (H) 02/08/2023    PROTIME 15.9 (H) 08/01/2022     No results found for: AMYLASE   Lab Results   Component Value Date    LABA1C 5.1 05/17/2022     Lab Results   Component Value Date    EAG 99.7 05/17/2022     Lab Results   Component Value Date    TSH 3.42 05/17/2022     Lab Results   Component Value Date    CKTOTAL 485 (H) 02/15/2023    TROPONINI 0.06 (H) 02/16/2023      Lab Results   Component Value Date    CRP 14.9 (H) 02/15/2023      No results found for: BNP   Lab Results   Component Value Date    DDIMER 2085 (H) 12/17/2020      Lab Results   Component Value Date    FERRITIN 379.5 (H) 02/16/2023      Lab Results   Component Value Date    LACTA 7.0 ([facility]) 02/16/2023           IMAGING:    Narrative   EXAMINATION:   CT OF THE CHEST, ABDOMEN, AND PELVIS WITHOUT CONTRAST 2/15/2023 5:40 pm           Impression   1. Chest CT demonstrates mild atelectatic changes but no parenchymal   infiltrates or active pleural disease. Mediastinal structures appear   unremarkable. 2. Liver cirrhosis also previously seen. 3. Moderate ascites as well as mesenteric congestion and edema also   previously seen. Mild splenomegaly. 4. No acute gastrointestinal abnormality. 5. Severe anasarca also previously seen. IMPRESSION:     Acute opioid overdose  Respiratory arrest needing mechanical ventilatory support  Polysubstance abuse  Liver cirrhosis  Ascites  COVID-19 infection  Lactic acidosis    RECOMMENDATION:     Patient had presented to the hospital after overdosing on opioids in the form of heroin. Her urine tox screen was also positive for amphetamines. Initially she responded to Narcan but then became obtunded and had to be intubated for airway protection.   I personally reviewed patient's chest imaging which showed minimal bilateral patchy infiltrates without any focal consolidation, pneumothorax. CT chest did not show any pleural effusion. Patient does have generalized anasarca as well as ascites present. Her respiratory distress was most likely due to opioids leading to hypoventilation. Her ABG done this morning was personally reviewed by me. It shows improving gas exchange with reducing hypercapnia. Currently on full mechanical ventilatory support with AC/VC mode, tidal volume of 450, rate of 18, PEEP of 5. Continue with same settings. End-tidal CO2 is 33-34. Wean her sedation down and try to wake her up. Once she is more awake, will start her on spontaneous breathing trial more in an effort to get her extubated. If she becomes agitated, can try Precedex gtt. Avoid benzodiazepine as patient has a history of liver cirrhosis. She will go for repeat ultrasound-guided paracentesis today. Low suspicion for aspiration. Can continue with Rocephin, doxycycline and Flagyl for now. Wean Levophed to maintain mean arterial pressure of 65 mmHg and above. May have to start the patient on midodrine if she is not spontaneously able to come off of vasopressor support. COVID-19 infection appears mild. She is currently on Decadron 6 mg IV daily. Continue for now. Patient getting Lasix 20 mg IV daily. Heparin for DVT prophylaxis. Total critical care time caring for this patient with life threatening illness, including direct patient contact, management of life support systems, review of data including imaging and labs, discussions with other team members and physicians is at least 35 minutes so far today, excluding procedures. Dionne Aldridge MD   Pulmonary Critical Care and Sleep Medicine  [facility]   [address] [facility], [place], [employer] [place]  2/15/2023, 1:58 PM    This note was completed using dragon medical speech recognition software. Grammatical errors, random word insertions, pronoun errors and incomplete sentences are occasional consequences of this technology due to software limitations. If there are questions or concerns about the content of this note of information contained within the body of this dictation they should be addressed with the provider for clarification.

## 2023-02-16 NOTE — CONSULTS
Gastroenterology Consult Note        Patient: Sherley Johnson  : 1975  Acct#:      Date:  2023    Subjective:       History of Present Illness  Patient is a 51 y.o.  female admitted with Lactic acidosis [E87.20]  Anasarca [R60.1]  Hyperammonemia (HCC) [E72.20]  Anoxic brain injury ([facility] [G93.1]  Polysubstance abuse ([facility] [F19.10]  CAT (acute kidney injury) ([facility] [N17.9]  Acute cystitis without hematuria [N30.00]  Acute respiratory failure with hypoxia ([facility] [J96.01]  Opiate overdose, accidental or unintentional, initial encounter [facility] [T40.601A]  Acute hypoxemic respiratory failure ([facility] [J96.01]  COVID-19 [U07.1] who is seen in consult for cirrhosis. History of IV drug abuse. History of hepatitis B and hepatitis C but patient has cleared the spontaneously. Saw Dr. Cox Degree 2022 as a new patient for elevated liver enzymes but did not follow-up for blood work or repeat office visit. She was admitted a week ago with ascites. CT with cirrhotic liver. Underwent paracentesis and fluid was consistent with portal hypertension. No SBP. There was no hepatic encephalopathy then. Outpatient EGD was recommended. She denied history of alcohol abuse. Labs then with negative LEIGH, AMA, Ceruloplasmin, ferritin. F-actin was weakly positive at 28. Alpha-1 antitrypsin phenotype MZ. Patient was brought to the ER yesterday after overdosing on heroin. She responded to Narcan. Later on she had PEA arrest and was resuscitated. GI consulted for cirrhosis. Noted to have elevated ammonia level. Patient remains sedated on the vent. Past Medical History:   Diagnosis Date    Anxiety     Arthritis     Asthma     COPD (chronic obstructive pulmonary disease) ([facility] 6/3/2022    Depression     lost 2 children, apx 2012.     RSD lower limb     right foot      Past Surgical History:   Procedure Laterality Date    ANKLE FRACTURE SURGERY      CARPAL TUNNEL RELEASE       SECTION      CHOLECYSTECTOMY      KNEE ARTHROSCOPY Bilateral     OVARY REMOVAL Right     TOTAL ANKLE ARTHROPLASTY      TUBAL LIGATION  1994      Past Endoscopic History: None    Admission Meds  No current facility-administered medications on file prior to encounter. Current Outpatient Medications on File Prior to Encounter   Medication Sig Dispense Refill    spironolactone (ALDACTONE) 100 MG tablet Take 1 tablet by mouth daily 30 tablet 3    furosemide (LASIX) 40 MG tablet Take 1 tablet by mouth daily 30 tablet 0    cefdinir (OMNICEF) 300 MG capsule Take 1 capsule by mouth 2 times daily for 10 days 20 capsule 0    gabapentin (NEURONTIN) 400 MG capsule Take 400 mg by mouth 3 times daily.       albuterol sulfate HFA (VENTOLIN HFA) 108 (90 Base) MCG/ACT inhaler Inhale 2 puffs into the lungs 4 times daily as needed for Wheezing 54 g 1    gabapentin (NEURONTIN) 400 MG capsule TAKE ONE CAPSULE BY MOUTH THREE TIMES A DAY 90 capsule 0    amitriptyline (ELAVIL) 50 MG tablet Take 1 tablet by mouth nightly (Patient not taking: Reported on 2/8/2023) 90 tablet 1    amLODIPine (NORVASC) 2.5 MG tablet Take 1 tablet by mouth daily (Patient not taking: Reported on 2/8/2023) 90 tablet 1    Compression Stockings MISC by Does not apply route 1 each 0    busPIRone (BUSPAR) 5 MG tablet Take 1 tablet by mouth 2 times daily 60 tablet 0    venlafaxine (EFFEXOR XR) 150 MG extended release capsule Take 1 capsule by mouth daily (Patient not taking: Reported on 2/8/2023) 30 capsule 2    umeclidinium-vilanterol (ANORO ELLIPTA) 62.5-25 MCG/INH AEPB inhaler Inhale 1 puff into the lungs daily 1 each 2    diclofenac (VOLTAREN) 50 MG EC tablet Take 1 tablet by mouth 3 times daily (with meals) (Patient not taking: Reported on 2/8/2023) 60 tablet 3           Allergies  Allergies   Allergen Reactions    Darvocet A500 [Propoxyphene N-Acetaminophen] Hives    Penicillins Hives      Social   Social History     Tobacco Use    Smoking status: Every Day Packs/day: 0.50     Years: 35.00     Pack years: 17.50     Types: Cigarettes     Start date: 25    Smokeless tobacco: Never   Substance Use Topics    Alcohol use: No     Comment: 1x a year maybe         Family History   Problem Relation Age of Onset    Depression Mother     Anxiety Disorder Mother     Arthritis Other     Asthma Other     Cancer Other     Diabetes Other     High Blood Pressure Other     High Blood Pressure Sister     Depression Sister     Anxiety Disorder Sister     Heart Disease Maternal Grandfather              Physical Exam  Blood pressure (!) 105/58, pulse 73, temperature 96.8 °F (36 °C), temperature source Temporal, resp. rate 18, height 5' 6\" (1.676 m), weight (!) 346 lb 9.6 oz (157.2 kg), SpO2 100 %, not currently breastfeeding. General appearance: Sedated on vent, no distress, appears stated age  Eyes: Anicteric  Head: Normocephalic, without obvious abnormality  Lungs: clear to auscultation bilaterally, Normal Effort  Heart: regular rate and rhythm, normal S1 and S2, no murmurs or rubs  Abdomen: Obese, soft, non-tender. Bowel sounds normal. No masses,  no organomegaly. Extremities: atraumatic, no cyanosis or edema  Skin: warm and dry, no jaundice  Neuro: Sedate  Musculoskeletal: No muscle wasting      Data Review:    Recent Labs     02/15/23  1654 02/16/23  0351 02/16/23 0425   WBC 11.6*  --  8.9   HGB 12.5 14.5 11.7*   HCT 39.0  --  36.0   .5*  --  101.9*     --  97*     Recent Labs     02/15/23  1654 02/16/23  0425    139   K 4.8 3.6   CL 99 104   CO2 20* 22   BUN 9 13   CREATININE 1.3* 1.3*     Recent Labs     02/15/23  1654 02/16/23 0425   * 143*   ALT 36 47*   BILITOT 1.1* 0.9   ALKPHOS 209* 179*     Recent Labs     02/15/23  1654   LIPASE 30.0     Recent Labs     02/15/23  1654   PROTIME 18.3*   INR 1.52*     No results for input(s): PTT in the last 72 hours. No results for input(s): OCCULTBLD in the last 72 hours.     Imaging Studies: CT-scan of chest abdomen and pelvis wo contrast 2/15/23:  FINDINGS:       Chest:       Mediastinum: Thoracic aorta, pulmonary arteries, heart, pericardium and   mediastinum appear stable. No evidence of lymphadenopathy. Lungs/pleura: Evidence of pulmonary congestion but no evidence of pulmonary   edema. Mild peripheral atelectatic changes but no consolidative infiltrates. No active pleural disease. Soft Tissues/Bones: No acute abnormality. Abdomen/Pelvis:       Organs: The liver demonstrates nodularity with cirrhosis. Status post   cholecystectomy. Spleen is enlarged measuring 14 cm. Pancreas appears   unremarkable. GI/Bowel: No evidence of bowel obstruction or perforation. Pelvis: The uterus, adnexa and urinary bladder appear stable. Peritoneum/Retroperitoneum: Evidence of moderate ascites especially in the   right abdomen as well as mesenteric congestion and edema. Scattered   retroperitoneal nodes but no evidence of lymphadenopathy. Bones/Soft Tissues: Evidence of significant anasarca and soft tissue edema   and fluid also previously seen. Impression   1. Chest CT demonstrates mild atelectatic changes but no parenchymal   infiltrates or active pleural disease. Mediastinal structures appear   unremarkable. 2. Liver cirrhosis also previously seen. 3. Moderate ascites as well as mesenteric congestion and edema also   previously seen. Mild splenomegaly. 4. No acute gastrointestinal abnormality. 5. Severe anasarca also previously seen. Assessment/Plan:      Cirrhosis - Saw Dr. Syed Booker once last year for elevated liver enzymes but then did not f/u thereafter and did not get blood work done. History of hepatitis B and hepatitis C but patient has cleared them spontaneously. Labs from last admission with negative LEIGH, AMA, Ceruloplasmin, ferritin, HCV RNA negative. F-actin was weakly positive at 28.   Alpha-1 antitrypsin phenotype MZ. No prior EGD for variceal screening. MELD-Na is 14. Recommend outpatient screening EGD    Hyperammonemia -no prior history of hepatic encephalopathy. Ammonia level was elevated at 75 at admission. Vearl Pippins No NG in place currently. Give lactulose enemas. Ascites -moderate per CT. Repeat paracentesis to rule out SBP. Cell count and cx. PEA arrest    IV drug use    Covid infection    Discussed with Dr. Leandro Gasca, PAALYSIA  [facility]    I have personally performed a face to face diagnostic evaluation on this patient. I have spent more than 50% of the total clinical encounter in interviewing/examining the patient, reviewing patient chart (including but not limited to notes, labs, imaging and other testing), documentation of findings and subsequent follow up of ordered medication and testing, placing referrals and communication with patient care providers, coordinating future care, as well as documentation in the EHR. I agree with the findings and recommended plan of care, as documented by the physician assistant/nurse practitioner. In summary, my findings and plan are the followin-year-old morbidly obese female with history of cirrhosis likely from alcohol. She has a history of hepatitis B and C but had cleared them spontaneously. She has seen Dr. Penny Ruiz briefly in 2022 but was lost to follow-up. EMS brought her to the hospital yesterday for narcotic overdose and was given Narcan. Later on she developed PEA and was resuscitated. She is also COVID-positive. She is currently vented and sedated. Impression and plan: Alcoholic cirrhosis with mildly elevated ammonia levels. Since she has ascites on exam, she will need paracentesis to rule out SBP. Overall, patient has poor prognosis. Current lactic acid is around 7. Continue supportive care with IV antibiotics and pressors. I recommend against inserting an NG tube since patient was not previously scoped.   She might have esophageal varices. Administer lactulose via enema.       Sp Bennett MD, MSc  [facility]  02/16/23

## 2023-02-16 NOTE — PROGRESS NOTES
02/16/23 0820   Vent Patient Data (Readings)   Driving Pressure 15   Static Compliance (L/cm H2O) 30   Dynamic Compliance (L/cm H2O) 22.4 L/cm H2O

## 2023-02-16 NOTE — SIGNIFICANT EVENT
CODE BLUE was called    Nurse came to evaluate the patient patient was alert, with no distress. 10 minutes later CMU called and patient became bradycardic, RN rushed to patient's room noted the patient was pulseless and CODE BLUE was called. On my evaluation patient was PEA arrest, ACLS chest compressions, epinephrine started. Patient was hypoxic, but despite being bagged continues to be cyanotic, rapid sequence intubation performed, please see procedure details. After intubation, ROSC was achieved. Vital signs not borderline low 65, 1 peripheral IV below AC. 1 L of bolus started while patient is being transferred to the ICU. Central and was then placed and epinephrine Levophed initiated for shock. Escalate antibiotics, repeat labs showing improvement of lactic acidosis suspicion from respiratory failure. Due to the immediate potential for life-threatening deterioration due to acute respiratory failure, shock, cardiac arrest, I spent 62 minutes providing critical care. This time is excluding time spent performing procedures.

## 2023-02-16 NOTE — PROGRESS NOTES
Nutrition Note    RECOMMENDATIONS  PO Diet: NPO  ONS: NPO  Nutrition Support:   Recommend EN support to start within 24-48 hours best practice. Recommend Vital AF 1.2 (peptide based formula) at goal rate 70 mL per hour to provide 1680 mL total volume, 2016 calories, 126 grams protein, 1362 mL free water. Recommend water bolus 160 mL q 6 hours. Ensure head of bed is 30 - 45 degrees during continuous or bolus gastric feeding and for one hour after bolus. Turn off the feeding if head of bed is lowered less than 30 degrees. Monitor for tolerance (bowel habits, N/V, cramping, abdominal exam findings: distended, firm, tense, guarded, discomfort). NUTRITION ASSESSMENT   Pt seen during IPOC critical care rounds on mechanical ventilation. Sedated with versed. Levophed infusing at 6 mcg/min. Lactic acid 7.0. Pt appears adequately nourished and weight has been stable. Recommend EN support to begin within 24-48 hours best practice. Nutrition Related Findings: LBM 2/16. Generalized edema. Wounds: None  Nutrition Education:  Education not indicated   Nutrition Goals: Initiate nutrition support, by next RD assessment     MALNUTRITION ASSESSMENT   Malnutrition Status: No malnutrition    NUTRITION DIAGNOSIS   Inadequate oral intake related to impaired respiratory function as evidenced by intubation    CURRENT NUTRITION THERAPIES  Diet NPO Exceptions are: Ice Chips, Sips of Water with Meds     PO Intake: NPO   PO Supplement Intake:NPO    ANTHROPOMETRICS  Current Height: 5' 6\" (167.6 cm)  Current Weight: (!) 346 lb 9.6 oz (157.2 kg)  Ideal Body Weight (IBW): 130 lbs  (59 kg)        BMI: 56    COMPARATIVE STANDARDS  Energy (kcal):  7214-7191     Protein (g):  118-148 grams       Fluid (mL/day):  0293-6870 mL    The patient will be monitored per nutrition standards of care. Consult dietitian if additional nutrition interventions are needed prior to RD reassessment.      Leda Goldstein, [address], [place]    Contact: [phone]

## 2023-02-16 NOTE — CARE COORDINATION
02/16/23 0855   Readmission Assessment   Number of Days since last admission? 1-7 days   Previous Disposition Home Alone   Who is being Interviewed Patient   What was the patient's/caregiver's perception as to why they think they needed to return back to the hospital?   (Patient suffered a drug-related overdose)   Did you visit your Primary Care Physician after you left the hospital, before you returned this time? No   Why weren't you able to visit your PCP? Did not have an appointment   Did you see a specialist, such as Cardiac, Pulmonary, Orthopedic Physician, etc. after you left the hospital? No   Who advised the patient to return to the hospital? Self-referral   Does the patient report anything that got in the way of taking their medications? No   In our efforts to provide the best possible care to you and others like you, can you think of anything that we could have done to help you after you left the hospital the first time, so that you might not have needed to return so soon? Teaching during hospitalization regarding your illness; Discharge instructions that are concise, clear, and non contradictory

## 2023-02-16 NOTE — PROGRESS NOTES
4 Eyes Skin Assessment     The patient is being assess for   Admission    I agree that 2 RN's have performed a thorough Head to Toe Skin Assessment on the patient. ALL assessment sites listed below have been assessed. Areas assessed for pressure by both nurses:   [x]   Head, Face, and Ears   [x]   Shoulders, Back, and Chest, Abdomen  [x]   Arms, Elbows, and Hands   [x]   Coccyx, Sacrum, and Ischium  [x]   Legs, Feet, and Heels        Skin Assessed Under all Medical Devices by both nurses:  N/a               All Mepilex Borders were peeled back and area peeked at by both nurses:  No: n/a  Please list where Mepilex Borders are located:  n/a             **SHARE this note so that the co-signing nurse is able to place an eSignature**    Co-signer eSignature: Electronically signed by Rosalba Tse RN on 2/16/23 at 1:21 AM EST    Does the Patient have Skin Breakdown related to pressure?    N/a             Sean Prevention initiated:  NA   Wound Care Orders initiated:  NA      [facility] nurse consulted for Pressure Injury (Stage 3,4, Unstageable, DTI, NWPT, Complex wounds)and New or Established Ostomies:  NA      Primary Nurse eSignature: Electronically signed by Inessa Vega RN on 2/15/23 at 11:34 PM EST

## 2023-02-17 PROBLEM — G93.1 ANOXIC BRAIN INJURY (HCC): Status: ACTIVE | Noted: 2023-01-01

## 2023-02-17 NOTE — SIGNIFICANT EVENT
Called to pronounce patient  No cardiopulmonary activity  No breath sounds  No heart sounds  Pupils fixed and dilated  Pronounced dead  Time of death 1700 hrs. 2/17/2023

## 2023-02-17 NOTE — PROGRESS NOTES
02/17/23 0758   Vent Patient Data (Readings)   Plateau Pressure (cm H2O) 20 cm H2O   Static Compliance (L/cm H2O) 30   Dynamic Compliance (L/cm H2O) 26.76 L/cm H2O

## 2023-02-17 NOTE — PLAN OF CARE
Problem: Safety - Adult  Goal: Free from fall injury  2/16/2023 2130 by Caden Pastrana RN  Outcome: Progressing     Problem: ABCDS Injury Assessment  Goal: Absence of physical injury  2/16/2023 2130 by Caden Pastrana RN  Outcome: Progressing     Problem: Pain  Goal: Verbalizes/displays adequate comfort level or baseline comfort level  2/16/2023 2130 by Caden Pastrana RN  Outcome: Progressing  Flowsheets (Taken 2/16/2023 2000)  Verbalizes/displays adequate comfort level or baseline comfort level: Assess pain using appropriate pain scale     Problem: Safety - Medical Restraint  Goal: Remains free of injury from restraints (Restraint for Interference with Medical Device)  Description: INTERVENTIONS:  1. Determine that other, less restrictive measures have been tried or would not be effective before applying the restraint  2. Evaluate the patient's condition at the time of restraint application  3. Inform patient/family regarding the reason for restraint  4.  Q2H: Monitor safety, psychosocial status, comfort, nutrition and hydration  2/16/2023 2130 by Caden Pastrana RN  Outcome: Progressing     Problem: Nutrition Deficit:  Goal: Optimize nutritional status  Outcome: Progressing     Problem: Discharge Planning  Goal: Discharge to home or other facility with appropriate resources  2/16/2023 2130 by Caden Pastrana RN  Outcome: Progressing  Flowsheets (Taken 2/16/2023 2000)  Discharge to home or other facility with appropriate resources:   Identify barriers to discharge with patient and caregiver   Arrange for needed discharge resources and transportation as appropriate   Identify discharge learning needs (meds, wound care, etc)   Arrange for interpreters to assist at discharge as needed   Refer to discharge planning if patient needs post-hospital services based on physician order or complex needs related to functional status, cognitive ability or social support system

## 2023-02-17 NOTE — PROGRESS NOTES
Pt family updated about the current and future plan of comfort care. Family expressed understanding about the plan of comfort care. Comfort care cat has been given to the family, waiting for the family to make a decision on withdrawing care.

## 2023-02-17 NOTE — BRIEF OP NOTE
Brief Postoperative Note    Petrona Figueroa  YOB: 1975  [id number]      Pre-operative Diagnosis: Ascites    Post-operative Diagnosis: Same    Procedure: US guided paracentesis    Anesthesia: Local    Surgeons/Assistants: James Jimenez DO    Estimated Blood Loss: less than 50     Complications: None    Specimens: Was Obtained: 60 mL clear yellow ascites    Findings:   Minimal peritoneal fluid. Diffuse body wall edema. A small pocket of peritoneal fluid was accessed along the RUQ and 60 mL of clear yellow ascites was removed.        James Jimenez DO  2/17/2023, 9:17 AM  Interventional Radiology  [phone] ([phone])

## 2023-02-17 NOTE — ACP (ADVANCE CARE PLANNING)
Advance Care Planning   Healthcare Decision Maker:    Primary Decision Maker: Hilda Climax - [phone]    Secondary Decision Maker: Kevin Fernandez Child - [phone]    Secondary Decision Maker: Nolan Marie - Child    Supplemental (Other) Decision Maker: Haider Aquino - Parent - [phone]    Documented decision maker(s) consistent with Legal Next of Kin hierarchy.

## 2023-02-17 NOTE — PROGRESS NOTES
Pt CT scan of the head results received, spoke with Dr Gerri Baez. Dr Gerri Baez stated pt has brain dead, pt family has already been notified by Dr Gerri Baez. [phone] Dorian Mcclain said it is okay to withdraw care.

## 2023-02-17 NOTE — PROGRESS NOTES
Assessment complete. VS obtained. See Flowsheets for more details. Unable to take correct measurements of temperature. Switched out regular dillard to temp sensing dillard. Core temperature reading 90.3. Hospitalist notified about the Pt's temperature. Warming blankets in place as per order. Current Vent settings as AC/VC: RR 18, , FiO2 60% & Peep 5. Levophed infusing @ 4 mcg/min  Pt is unresponsive/no gag/unable to follow commands/non-reactive pupils. Respirations regular/tolerating vent. Lung sounds diminished. NSR. Abdomen rounded/soft/obese with hypoactive bowel sounds. Peripheral pulses palpable. Skin remains intact. Dillard draining/patent. Pt repositioned/cleaned & remains comfortable. Standard safety measures in place. 0030: Levophed weaned down & stopped per Pt's BP readings. BP remains stable.

## 2023-02-17 NOTE — CARE COORDINATION
Palliative Care:     Pt's , Dora Wheatley, pt's two sons, Mandy Arriola and Orly Arboleda, pt's mother, Checo Interiano, pt's father, pt's sister, Dario Luevano, and many other support persons present throughout afternoon. Valeria Alatorre, and Checo Interiano informed of pt's CT and EEG results and the severity of injury evident. Together, all family agreeable to de-escalating pt's mechanical and medical support. All family who desired to be at bedside are present. RN informed of family agreement. 1610: Pt's RN attached morphine gtt. 1615: RT and pt's RN extubated pt.

## 2023-02-17 NOTE — PROGRESS NOTES
Hospitalist Progress Note      PCP: Abimael Hinton MD    Date of Admission: 2/15/2023        Hospital Course:     Impression:    51 y.o. female who presented to [facility] with Past medical history of asthma, COPD, depression, RSD presented to the ED with chief complaint of drug overdose 2/16 long discussion with family regarding goals of care and code status. Palliative consulted. A/P:       Acute encephalopathy: Suspected hepatic possibly metabolic , CT head negative, Lactulose ordered     2. Acute hypoxic respiratory failure: Secondary to pneumonia and COVID-19  ET/MV    3. Pneumonia:  cont steroids and IV abx.     4. Decompensated cirrhosis: paracentesis, d/w GI service. 5. Acute renal failure: Nephrology consulted, much appreciated     6. NSTEMI: Type II in the setting of infection     Code status: full code    Dvt ppx: hep sq    Transition of care: pending clinical course    ADOD: possibly 4-5 days, remains critically ill in ICU    24 hour care plan:    Cont ICU care, pending paracentesis, cont abx. Guarded condition. Vent per pulm.        Medications:  Reviewed    Infusion Medications    norepinephrine Stopped (02/17/23 0045)    midazolam Stopped (02/16/23 1130)    sodium chloride 5 mL/hr at 02/16/23 0555     Scheduled Medications    doxycycline (VIBRAMYCIN) IV  100 mg IntraVENous Q12H    metroNIDAZOLE  500 mg IntraVENous Q8H    lactulose enema   Rectal Q6H    furosemide  20 mg IntraVENous Daily    sodium chloride flush  10 mL IntraVENous 2 times per day    dexamethasone  6 mg IntraVENous Q24H    heparin (porcine)  5,000 Units SubCUTAneous 3 times per day    cefTRIAXone (ROCEPHIN) IV  1,000 mg IntraVENous Q24H     PRN Meds: sodium chloride flush, sodium chloride, potassium chloride, magnesium sulfate, promethazine **OR** ondansetron, guaiFENesin-dextromethorphan, acetaminophen **OR** acetaminophen      Intake/Output Summary (Last 24 hours) at 2/17/2023 0813  Last data filed at 2/17/2023 8037  Gross per 24 hour   Intake 410.12 ml   Output 1975 ml   Net -1564.88 ml       Exam:    BP (!) 99/52   Pulse 75   Temp (!) 90.6 °F (32.6 °C) (Core)   Resp 18   Ht 5' 6\" (1.676 m)   Wt (!) 343 lb 8 oz (155.8 kg)   SpO2 95%   BMI 55.44 kg/m²     General appearance: intubated. HEENT: Pupils equal, round, and reactive to light. Conjunctivae/corneas clear. Neck: Supple, with full range of motion. No jugular venous distention. Trachea midline. Respiratory:  diminshed  Cardiovascular: Regular rate and rhythm with normal S1/S2 without murmurs, rubs or gallops. Abdomen: Soft, non-tender, non-distended with normal bowel sounds. Musculoskeletal: No clubbing, cyanosis or edema bilaterally. Full range of motion without deformity. Skin: Skin color, texture, turgor normal.  No rashes or lesions. Neurologic:  intubated  Psychiatric: intubated.    Capillary Refill: Brisk,< 3 seconds   Peripheral Pulses: +2 palpable, equal bilaterally       Labs:   Recent Labs     02/15/23  1654 02/16/23  0351 02/16/23 0425 02/17/23 0430   WBC 11.6*  --  8.9 9.2   HGB 12.5 14.5 11.7* 11.9*   HCT 39.0  --  36.0 35.0*     --  97* 97*     Recent Labs     02/15/23  1654 02/16/23  0425 02/17/23  0430    139 143   K 4.8 3.6 3.1*   CL 99 104 108   CO2 20* 22 29   BUN 9 13 21*   CREATININE 1.3* 1.3* 1.2*   CALCIUM 8.4 8.2* 8.8     Recent Labs     02/15/23  1654 02/16/23  0425 02/17/23  0430   * 143* 131*   ALT 36 47* 49*   BILITOT 1.1* 0.9 0.7   ALKPHOS 209* 179* 157*     Recent Labs     02/15/23  1654   INR 1.52*     Recent Labs     02/15/23  1654 02/16/23  0425   CKTOTAL 485*  --    TROPONINI 0.07*  0.06* 0.06*       Urinalysis:      Lab Results   Component Value Date/Time    NITRU Negative 02/15/2023 06:24 PM    WBCUA 202 02/15/2023 06:24 PM    BACTERIA 3+ 02/15/2023 06:24 PM    RBCUA 100 02/15/2023 06:24 PM    BLOODU LARGE 02/15/2023 06:24 PM    SPECGRAV 1.015 02/15/2023 06:24 PM    GLUCOSEU 100 02/15/2023 06:24 PM       Radiology:  XR CHEST PORTABLE   Final Result   1. Endotracheal tube tip now lies 2.6 cm above the bismark. 2.  Similar appearance of small left pleural effusion with bilateral airspace   opacities. XR CHEST PORTABLE   Final Result   1. Endotracheal tube terminates in the proximal right knee mainstem   bronchus, for which retraction 4-5 cm is recommended. 2.  Right internal jugular line terminates at the level of the distal SVC. 3.  Bilateral airspace disease appears more prominent in the interval,   notably in the right upper lobe and right lung base. CT CHEST ABDOMEN PELVIS WO CONTRAST Additional Contrast? None   Final Result   1. Chest CT demonstrates mild atelectatic changes but no parenchymal   infiltrates or active pleural disease. Mediastinal structures appear   unremarkable. 2. Liver cirrhosis also previously seen. 3. Moderate ascites as well as mesenteric congestion and edema also   previously seen. Mild splenomegaly. 4. No acute gastrointestinal abnormality. 5. Severe anasarca also previously seen. CT HEAD WO CONTRAST   Final Result   Extensive hypodensities involving bilateral cerebellar hemispheres and   bilateral globus pallidus highly suggestive of hypoxic ischemic injury. The extensive ischemic injury of the cerebellum is associated with   obliteration of sulci and cistern and 4th ventricle and development of mild   hydrocephalus with distension of temporal horns of the lateral ventricle.          IR US GUIDED PARACENTESIS    (Results Pending)           Assessment/Plan:    Active Hospital Problems    Diagnosis Date Noted    Acute hypoxemic respiratory failure ([facility] [address] [J96.01] 02/15/2023     Priority: [address], DO

## 2023-02-17 NOTE — PROGRESS NOTES
Hospitalist Progress Note      PCP: Giancarlo So MD    Date of Admission: 2/15/2023        Hospital Course:     Impression:    51 y.o. female who presented to [facility] with Past medical history of asthma, COPD, depression, RSD presented to the ED with chief complaint of drug overdose 2/17 remains in ICU in critical condition. A/P:       Acute encephalopathy: Suspected hepatic possibly metabolic , CT head negative, Lactulose ordered      2. Acute hypoxic respiratory failure: Secondary to pneumonia and COVID-19  ET/MV    3. Pneumonia:  cont steroids and IV abx.     4. Decompensated cirrhosis: paracentesis, d/w GI service. 5. CAT vs ATN: Nephrology consulted, monitor renal function and urine output. 6. NSTEMI: Type II in the setting of infection     Code status: full code    Dvt ppx: hep sq    Transition of care: pending clinical course    ADOD: possibly 4-5 days, remains critically ill in ICU    24 hour care plan:    Cont ICU care, pending paracentesis, cont abx. Guarded condition. Vent per pulm.        Medications:  Reviewed    Infusion Medications    norepinephrine Stopped (02/17/23 0045)    midazolam Stopped (02/16/23 1130)    sodium chloride 5 mL/hr at 02/16/23 0555     Scheduled Medications    doxycycline (VIBRAMYCIN) IV  100 mg IntraVENous Q12H    metroNIDAZOLE  500 mg IntraVENous Q8H    lactulose enema   Rectal Q6H    furosemide  20 mg IntraVENous Daily    sodium chloride flush  10 mL IntraVENous 2 times per day    dexamethasone  6 mg IntraVENous Q24H    heparin (porcine)  5,000 Units SubCUTAneous 3 times per day    cefTRIAXone (ROCEPHIN) IV  1,000 mg IntraVENous Q24H     PRN Meds: sodium chloride flush, sodium chloride, potassium chloride, magnesium sulfate, promethazine **OR** ondansetron, guaiFENesin-dextromethorphan, acetaminophen **OR** acetaminophen      Intake/Output Summary (Last 24 hours) at 2/17/2023 0821  Last data filed at 2/17/2023 0521  Gross per 24 hour   Intake 410.12 ml   Output 1975 ml   Net -1564.88 ml         Exam:    BP (!) 99/52   Pulse 77   Temp (!) 90.6 °F (32.6 °C) (Core)   Resp 18   Ht 5' 6\" (1.676 m)   Wt (!) 343 lb 8 oz (155.8 kg)   SpO2 95%   BMI 55.44 kg/m²     General appearance: intubated. HEENT: Pupils equal, round, and reactive to light. Conjunctivae/corneas clear. Neck: Supple, with full range of motion. No jugular venous distention. Trachea midline. Respiratory:  diminshed  Cardiovascular: Regular rate and rhythm with normal S1/S2 without murmurs, rubs or gallops. Abdomen: Soft, non-tender, non-distended with normal bowel sounds. Musculoskeletal: No clubbing, cyanosis or edema bilaterally. Full range of motion without deformity. Skin: Skin color, texture, turgor normal.  No rashes or lesions. Neurologic:  intubated  Psychiatric: intubated.    Capillary Refill: Brisk,< 3 seconds   Peripheral Pulses: +2 palpable, equal bilaterally       Labs:   Recent Labs     02/15/23  1654 02/16/23  0351 02/16/23 0425 02/17/23  0430   WBC 11.6*  --  8.9 9.2   HGB 12.5 14.5 11.7* 11.9*   HCT 39.0  --  36.0 35.0*     --  97* 97*       Recent Labs     02/15/23  1654 02/16/23  0425 02/17/23  0430    139 143   K 4.8 3.6 3.1*   CL 99 104 108   CO2 20* 22 29   BUN 9 13 21*   CREATININE 1.3* 1.3* 1.2*   CALCIUM 8.4 8.2* 8.8       Recent Labs     02/15/23  1654 02/16/23  0425 02/17/23  0430   * 143* 131*   ALT 36 47* 49*   BILITOT 1.1* 0.9 0.7   ALKPHOS 209* 179* 157*       Recent Labs     02/15/23  1654   INR 1.52*       Recent Labs     02/15/23  1654 02/16/23  0425   CKTOTAL 485*  --    TROPONINI 0.07*  0.06* 0.06*         Urinalysis:      Lab Results   Component Value Date/Time    NITRU Negative 02/15/2023 06:24 PM    WBCUA 202 02/15/2023 06:24 PM    BACTERIA 3+ 02/15/2023 06:24 PM    RBCUA 100 02/15/2023 06:24 PM    BLOODU LARGE 02/15/2023 06:24 PM    SPECGRAV 1.015 02/15/2023 06:24 PM    GLUCOSEU 100 02/15/2023 06:24 PM       Radiology:  XR CHEST PORTABLE   Final Result   1. Endotracheal tube tip now lies 2.6 cm above the bismark. 2.  Similar appearance of small left pleural effusion with bilateral airspace   opacities. XR CHEST PORTABLE   Final Result   1. Endotracheal tube terminates in the proximal right knee mainstem   bronchus, for which retraction 4-5 cm is recommended. 2.  Right internal jugular line terminates at the level of the distal SVC. 3.  Bilateral airspace disease appears more prominent in the interval,   notably in the right upper lobe and right lung base. CT CHEST ABDOMEN PELVIS WO CONTRAST Additional Contrast? None   Final Result   1. Chest CT demonstrates mild atelectatic changes but no parenchymal   infiltrates or active pleural disease. Mediastinal structures appear   unremarkable. 2. Liver cirrhosis also previously seen. 3. Moderate ascites as well as mesenteric congestion and edema also   previously seen. Mild splenomegaly. 4. No acute gastrointestinal abnormality. 5. Severe anasarca also previously seen. CT HEAD WO CONTRAST   Final Result   Extensive hypodensities involving bilateral cerebellar hemispheres and   bilateral globus pallidus highly suggestive of hypoxic ischemic injury. The extensive ischemic injury of the cerebellum is associated with   obliteration of sulci and cistern and 4th ventricle and development of mild   hydrocephalus with distension of temporal horns of the lateral ventricle.          IR US GUIDED PARACENTESIS    (Results Pending)           Assessment/Plan:    Active Hospital Problems    Diagnosis Date Noted    Acute hypoxemic respiratory failure ([facility] [address] [J96.01] 02/15/2023     Priority: [address], DO

## 2023-02-17 NOTE — PROGRESS NOTES
PULMONARY AND CRITICAL CARE MEDICINE PROGRESS NOTE    Subjective: Patient remains critically ill. Off of sedation for 24 hours. Poor mentation with loss of cranial nerve reflexes. On full mechanical ventilatory support. Hypothermic. Off of vasopressors. REVIEW OF SYSTEMS:   14 point ROS could not be obtained due to patient factors. Intubated and unresponsive. PAST MEDICAL HISTORY:   Past Medical History:   Diagnosis Date    Anxiety     Arthritis     Asthma     COPD (chronic obstructive pulmonary disease) ([facility] [address] 6/3/2022    Depression     lost 2 children, apx 2012. RSD lower limb     right foot       PAST SURGICAL HISTORY:   Past Surgical History:   Procedure Laterality Date    ANKLE FRACTURE SURGERY      CARPAL TUNNEL RELEASE       SECTION      CHOLECYSTECTOMY      KNEE ARTHROSCOPY Bilateral     OVARY REMOVAL Right     TOTAL ANKLE ARTHROPLASTY      TUBAL LIGATION         SOCIAL HISTORY:   Social History     Tobacco Use    Smoking status: Every Day     Packs/day: 0.50     Years: 35.00     Pack years: 17.50     Types: Cigarettes     Start date:     Smokeless tobacco: Never   Vaping Use    Vaping Use: Never used   Substance Use Topics    Alcohol use: No     Comment: 1x a year maybe     Drug use: Yes     Types: Opiates      Comment: in past with death of children        FAMILY HISTORY:   Family History   Problem Relation Age of Onset    Depression Mother     Anxiety Disorder Mother     Arthritis Other     Asthma Other     Cancer Other     Diabetes Other     High Blood Pressure Other     High Blood Pressure Sister     Depression Sister     Anxiety Disorder Sister     Heart Disease Maternal Grandfather        MEDICATIONS:     No current facility-administered medications on file prior to encounter.      Current Outpatient Medications on File Prior to Encounter   Medication Sig Dispense Refill    spironolactone (ALDACTONE) 100 MG tablet Take 1 tablet by mouth daily 30 tablet 3 furosemide (LASIX) 40 MG tablet Take 1 tablet by mouth daily 30 tablet 0    cefdinir (OMNICEF) 300 MG capsule Take 1 capsule by mouth 2 times daily for 10 days 20 capsule 0    gabapentin (NEURONTIN) 400 MG capsule Take 400 mg by mouth 3 times daily.       albuterol sulfate HFA (VENTOLIN HFA) 108 (90 Base) MCG/ACT inhaler Inhale 2 puffs into the lungs 4 times daily as needed for Wheezing 54 g 1    gabapentin (NEURONTIN) 400 MG capsule TAKE ONE CAPSULE BY MOUTH THREE TIMES A DAY 90 capsule 0    amitriptyline (ELAVIL) 50 MG tablet Take 1 tablet by mouth nightly (Patient not taking: Reported on 2/8/2023) 90 tablet 1    amLODIPine (NORVASC) 2.5 MG tablet Take 1 tablet by mouth daily (Patient not taking: Reported on 2/8/2023) 90 tablet 1    Compression Stockings MISC by Does not apply route 1 each 0    busPIRone (BUSPAR) 5 MG tablet Take 1 tablet by mouth 2 times daily 60 tablet 0    venlafaxine (EFFEXOR XR) 150 MG extended release capsule Take 1 capsule by mouth daily (Patient not taking: Reported on 2/8/2023) 30 capsule 2    umeclidinium-vilanterol (ANORO ELLIPTA) 62.5-25 MCG/INH AEPB inhaler Inhale 1 puff into the lungs daily 1 each 2    diclofenac (VOLTAREN) 50 MG EC tablet Take 1 tablet by mouth 3 times daily (with meals) (Patient not taking: Reported on 2/8/2023) 60 tablet 3        sodium chloride flush  10 mL IntraVENous 2 times per day      norepinephrine Stopped (02/17/23 0045)    midazolam Stopped (02/16/23 1130)    sodium chloride 5 mL/hr at 02/16/23 0555     sodium chloride flush, sodium chloride, potassium chloride, magnesium sulfate, promethazine **OR** ondansetron, guaiFENesin-dextromethorphan, acetaminophen **OR** acetaminophen      ALLERGIES:   Allergies as of 02/15/2023 - Fully Reviewed 02/15/2023   Allergen Reaction Noted    Darvocet a500 [propoxyphene n-acetaminophen] Hives 10/16/2013    Penicillins Hives 10/16/2013      OBJECTIVE:   height is 5' 6\" (1.676 m) and weight is 343 lb 8 oz (155.8 kg) (abnormal). Her temporal temperature is 95.7 °F (35.4 °C) (abnormal). Her blood pressure is 98/48 (abnormal) and her pulse is 78. Her respiration is 18 and oxygen saturation is 95%. No intake/output data recorded. PHYSICAL EXAM:    CONSTITUTIONAL: She is a 51y.o.-year-old who appears her stated age. She is intubated and unresponsive. HEENT: . No scleral icterus. No thrush, atraumatic, normocephalic. NECK: Supple, without cervical or supraclavicular lymphadenopathy:  CARDIOVASCULAR: S1 S2 RRR. Without murmer. No JVD or hepatojugular reflux seen. RESPIRATORY & CHEST: Bibasilar decreased breath sounds. No wheezing heard. GASTROINTESTINAL & ABDOMEN: Soft, nontender, positive bowel sounds in all quadrants, non-distended, without hepatosplenomegaly. GENITOURINARY: No gross anatomical abnormalities seen. MUSCULOSKELETAL: No tenderness to palpation of the axial skeleton. There is no clubbing. No cyanosis. No edema of the lower extremities. SKIN OF BODY: No rash or jaundice. PSYCHIATRIC EVALUATION: Unable to assess. HEMATOLOGIC/LYMPHATIC/ IMMUNOLOGIC: No palpable lymphadenopathy. NEUROLOGIC: Intubated. Unresponsive to verbal, tactile or noxious stimuli. No pupillary, gag, corneal, cough reflex present. Bilateral Babinski's are flaccid.      LABS:  Recent Labs     02/15/23  1654 02/16/23  0351 02/16/23  0425 02/17/23  0430   WBC 11.6*  --  8.9 9.2   HGB 12.5 14.5 11.7* 11.9*   HCT 39.0  --  36.0 35.0*     --  97* 97*   ALT 36  --  47* 49*   *  --  143* 131*     --  139 143   K 4.8  --  3.6 3.1*   CL 99  --  104 108   CREATININE 1.3*  --  1.3* 1.2*   BUN 9  --  13 21*   CO2 20*  --  22 29   INR 1.52*  --   --   --        Recent Labs     02/15/23  1654 02/16/23  0425 02/17/23  0430   GLUCOSE 116* 157* 130*   CALCIUM 8.4 8.2* 8.8    139 143   K 4.8 3.6 3.1*   CO2 20* 22 29   CL 99 104 108   BUN 9 13 21*   CREATININE 1.3* 1.3* 1.2*       Recent Labs     02/16/23  0351 02/16/23  0847 02/17/23  0530   PHART 7.062* 7.333* 7.361   TUC7YKM 70.3* 48.0* 48.7*   PO2ART 182.7* 83.4 85.3   CMS1IBJ 20.0* 25.5 27.6   J9JQSQBU 99 96.5 96.9   BEART -10* -0.8 1.5       Lab Results   Component Value Date    INR 1.52 (H) 02/15/2023    INR 1.39 (H) 02/08/2023    INR 1.28 (H) 08/01/2022    PROTIME 18.3 (H) 02/15/2023    PROTIME 17.0 (H) 02/08/2023    PROTIME 15.9 (H) 08/01/2022     No results found for: AMYLASE   Lab Results   Component Value Date    LABA1C 5.1 05/17/2022     Lab Results   Component Value Date    EAG 99.7 05/17/2022     Lab Results   Component Value Date    TSH 3.42 05/17/2022     Lab Results   Component Value Date    CKTOTAL 485 (H) 02/15/2023    TROPONINI 0.06 (H) 02/16/2023      Lab Results   Component Value Date    CRP 14.9 (H) 02/15/2023      No results found for: BNP   Lab Results   Component Value Date    DDIMER 2085 (H) 12/17/2020      Lab Results   Component Value Date    FERRITIN 379.5 (H) 02/16/2023      Lab Results   Component Value Date    LACTA 1.5 02/17/2023           IMAGING:    Narrative   EXAMINATION:   CT OF THE CHEST, ABDOMEN, AND PELVIS WITHOUT CONTRAST 2/15/2023 5:40 pm           Impression   1. Chest CT demonstrates mild atelectatic changes but no parenchymal   infiltrates or active pleural disease. Mediastinal structures appear   unremarkable. 2. Liver cirrhosis also previously seen. 3. Moderate ascites as well as mesenteric congestion and edema also   previously seen. Mild splenomegaly. 4. No acute gastrointestinal abnormality. 5. Severe anasarca also previously seen. Narrative   EXAMINATION:   CT OF THE HEAD WITHOUT CONTRAST  2/17/2023 10:59 am           Impression   Diffuse loss of gray-white matter interface throughout with suspected   intracerebral edema. Appearance raises the question of evolving or   progressive anoxic brain injury.   Recommend correlation with clinical   presentation       The findings were sent to the Radiology Results Communication Center at 12:04   pm on 2/17/2023 to be communicated to a licensed caregiver. IMPRESSION:     Acute opioid overdose  Respiratory arrest needing mechanical ventilatory support  Polysubstance abuse  Liver cirrhosis  Ascites  COVID-19 infection  Lactic acidosis    RECOMMENDATION:     Patient had presented to the hospital after overdosing on opioids in the form of heroin. Her urine tox screen was also positive for amphetamines. Initially she responded to Narcan but then became obtunded and had to be intubated for airway protection. Patient's mentation remains poor off of all sedation since yesterday. I repeated his CT head today which shows diffuse cerebral edema suggestive of severe anoxic injury. Currently on neurological examination she does not have any cranial nerve reflexes present. Findings are highly suggestive of brain death. Currently on full mechanical ventilatory support with AC/VC mode, tidal volume of 450, rate of 18, PEEP of 5. Continue with same settings. Unable to wean due to poor mentation. She underwent ultrasound-guided paracentesis today. Only 60 cc of ascitic fluid was able to be drained. Low suspicion for aspiration. Can change her to Levaquin. Off of vasopressor support. COVID-19 infection appears mild. She is currently on Decadron 6 mg IV daily. Continue for now. Patient getting Lasix 20 mg IV daily. Heparin for DVT prophylaxis. I discussed the case with various family members throughout the day. I updated them about severe anoxic encephalopathy/ with poor prognosis. Patient's mother wished to change her CODE STATUS to DNR-cc and withdraw care. Patient has complex family dynamics. May have to get patient's  involved in decision-making.     Total critical care time caring for this patient with life threatening illness, including direct patient contact, management of life support systems, review of data including imaging and labs, discussions with other team members and physicians is at least 32 minutes so far today, excluding procedures. Ari Tillman MD   Pulmonary Critical Care and Sleep Medicine  [facility]   [place] [place] [address], [place], [address]  2/15/2023, 1:38 PM    This note was completed using dragon medical speech recognition software. Grammatical errors, random word insertions, pronoun errors and incomplete sentences are occasional consequences of this technology due to software limitations. If there are questions or concerns about the content of this note of information contained within the body of this dictation they should be addressed with the provider for clarification.

## 2023-02-17 NOTE — PROGRESS NOTES
Pt was examined by Dr Sonjia Peabody this morning and ordered for CT scan of the head and EEG STAT. Results from the CT scan informed Dr Sonjia Peabody to make a decision about the [facility] plan of care. Pt family decided to withdraw care at 1300 upon the MD advice. Pt was subsequently placed on DNR-CC, Comfort care given to the pt. Intubation tube pulled out of the pt upon the request of the family at 0. Pt was subsequently pronounced dead by Dr Rossy Valverde in front of pt family members at 5:15pm. [facility] has been contacted and the body has been prepared and bagged to be transported to the [facility].

## 2023-02-17 NOTE — PROCEDURES
2/17/2023     Patient: Myron Samaniego    MR Number: [id number]  YOB: 1975  Date of Visit: 2/17/2023    Clinical History:    The patient is a 51y.o. years old female with acute encephalopathy         Method: The EEG was performed utilizing the international 10/20 of electrode placements of both referential and bipolar montages. The patient is in coma through out the recording. Findings: The background of the EEG showed generalized amplitude suppression throughout the recording. No EEG background or activation. No EEG seizures. Impression: This EEG is abnormal. The generalized diffuse background suppression is suggestive of severe diffuse encephalopathy. This was not a brain death protocol.           Hemanth Potts MD      Board certified in clinical neurophysiology

## 2023-02-17 NOTE — PROGRESS NOTES
Office : [phone]     Fax :[phone]       Nephrology progress  Note      Patient's Name: Yancy Smart  9:55 AM  2023    Reason for Consult: CAT      Requesting Physician:  Pavan Montalvo MD      Chief Complaint:    Chief Complaint   Patient presents with    Drug Overdose     Pt arrived via [facility] from home d/t heroin OD. Per squad pt responsive to sternal rub only. Pt got 8 mg Narcan via IN. Pt w liver cirrhosis            History of Present iIlness:    Yancy Smart is a 51 y.o. female with prior history of asthma, COPD, depression, presented to the ED with chief complaint of drug overdose   she is intubated at this time and all history from medical records     Interval hx : Intubated   BP low   On levophed       I/O last 3 completed shifts: In: 460.1 [I.V.:90.7; IV Piggyback:369.4]  Out: 4875 [Urine:4875]    Past Medical History:   Diagnosis Date    Anxiety     Arthritis     Asthma     COPD (chronic obstructive pulmonary disease) ([facility] [address] 6/3/2022    Depression     lost 2 children, apx 2012. RSD lower limb     right foot       Past Surgical History:   Procedure Laterality Date    ANKLE FRACTURE SURGERY      CARPAL TUNNEL RELEASE       SECTION      CHOLECYSTECTOMY      KNEE ARTHROSCOPY Bilateral     OVARY REMOVAL Right     TOTAL ANKLE ARTHROPLASTY      TUBAL LIGATION         Family History   Problem Relation Age of Onset    Depression Mother     Anxiety Disorder Mother     Arthritis Other     Asthma Other     Cancer Other     Diabetes Other     High Blood Pressure Other     High Blood Pressure Sister     Depression Sister     Anxiety Disorder Sister     Heart Disease Maternal Grandfather         reports that she has been smoking cigarettes.  She started smoking about 36 years ago. She has a 17.50 pack-year smoking history. She has never used smokeless tobacco. She reports current drug use. Drug: Opiates . She reports that she does not drink alcohol.        Allergies:  Darvocet a500 [propoxyphene n-acetaminophen] and Penicillins    Current Medications:    levoFLOXacin (LEVAQUIN) 750 MG/150ML infusion 750 mg, Q24H  norepinephrine (LEVOPHED) 16 mg in sodium chloride 0.9 % 250 mL infusion, Continuous  midazolam (VERSED) 1 mg/mL in NS infusion, Continuous  lactulose enema, Q6H  furosemide (LASIX) injection 20 mg, Daily  sodium chloride flush 0.9 % injection 10 mL, 2 times per day  sodium chloride flush 0.9 % injection 10 mL, PRN  0.9 % sodium chloride infusion, PRN  potassium chloride 10 mEq/100 mL IVPB (Peripheral Line), PRN  magnesium sulfate 2000 mg in 50 mL IVPB premix, PRN  promethazine (PHENERGAN) tablet 12.5 mg, Q6H PRN   Or  ondansetron (ZOFRAN) injection 4 mg, Q6H PRN  guaiFENesin-dextromethorphan (ROBITUSSIN DM) 100-10 MG/5ML syrup 5 mL, Q4H PRN  dexamethasone (DECADRON) (PF) 10 mg/mL injection, Q24H  acetaminophen (TYLENOL) tablet 650 mg, Q6H PRN   Or  acetaminophen (TYLENOL) suppository 650 mg, Q6H PRN  heparin (porcine) injection 5,000 Units, 3 times per day      Review of Systems:   14 point ROS obtained but were negative except mentioned in HPI      Physical exam:     Vitals:  BP (!) 98/48   Pulse 78   Temp (!) 95.7 °F (35.4 °C) (Temporal)   Resp 18   Ht 5' 6\" (1.676 m)   Wt (!) 343 lb 8 oz (155.8 kg)   SpO2 95%   BMI 55.44 kg/m²   Constitutional:  intubated   Skin: no rash, turgor wnl  Heent:  eomi, mmm  Neck: no bruits or jvd noted  Cardiovascular:  S1, S2 without m/r/g  Respiratory: CTA B without w/r/r  Abdomen:  +bs, soft, nt, nd  Ext: mild  lower extremity edema    Labs:  CBC:   Recent Labs     02/15/23  1654 02/16/23  0351 02/16/23  0425 02/17/23  0430   WBC 11.6*  --  8.9 9.2   HGB 12.5 14.5 11.7* 11.9*     --  97* 97*  BMP:    Recent Labs     02/15/23  1654 02/16/23  0425 02/17/23  0430    139 143   K 4.8 3.6 3.1*   CL 99 104 108   CO2 20* 22 29   BUN 9 13 21*   CREATININE 1.3* 1.3* 1.2*   GLUCOSE 116* 157* 130*     Ca/Mg/Phos:   Recent Labs     02/15/23  1654 02/16/23  0425 02/17/23  0430   CALCIUM 8.4 8.2* 8.8   MG  --   --  2.00     Hepatic:   Recent Labs     02/15/23  1654 02/16/23  0425 02/17/23  0430   * 143* 131*   ALT 36 47* 49*   BILITOT 1.1* 0.9 0.7   ALKPHOS 209* 179* 157*     Troponin:   Recent Labs     02/15/23  1654 02/16/23  0425   TROPONINI 0.07*  0.06* 0.06*     BNP: No results for input(s): BNP in the last 72 hours. Lipids: No results for input(s): CHOL, TRIG, HDL, LDLCALC, LABVLDL in the last 72 hours. ABGs:   Recent Labs     02/17/23  0530   PHART 7.361   PO2ART 85.3   ECC3WJV 48.7*     INR:   Recent Labs     02/15/23  1654   INR 1.52*     UA:  Recent Labs     02/15/23  1824   COLORU DARK YELLOW*   CLARITYU TURBID*   GLUCOSEU 100*   BILIRUBINUR SMALL*   KETUA Negative   SPECGRAV 1.015   BLOODU LARGE*   PHUR 5.5  5.5   PROTEINU 300   UROBILINOGEN 1.0   NITRU Negative   LEUKOCYTESUR MODERATE*   LABMICR YES   URINETYPE NotGiven      Urine Microscopic:   Recent Labs     02/15/23  1824   BACTERIA 3+*   HYALCAST 68*  Present*   WBCUA 202*   RBCUA 100*   EPIU 25*     Urine Culture:   Recent Labs     02/15/23  1824   LABURIN 25,000 CFU/ml  Sensitivity to follow       Urine Chemistry: No results for input(s): Dakota Brittle, PROTEINUR, NAUR in the last 72 hours. IMAGING:  XR CHEST PORTABLE   Final Result   1. Endotracheal tube tip now lies 2.6 cm above the bismark. 2.  Similar appearance of small left pleural effusion with bilateral airspace   opacities. XR CHEST PORTABLE   Final Result   1. Endotracheal tube terminates in the proximal right knee mainstem   bronchus, for which retraction 4-5 cm is recommended.       2.  Right internal jugular line terminates at the level of the distal SVC. 3.  Bilateral airspace disease appears more prominent in the interval,   notably in the right upper lobe and right lung base. CT CHEST ABDOMEN PELVIS WO CONTRAST Additional Contrast? None   Final Result   1. Chest CT demonstrates mild atelectatic changes but no parenchymal   infiltrates or active pleural disease. Mediastinal structures appear   unremarkable. 2. Liver cirrhosis also previously seen. 3. Moderate ascites as well as mesenteric congestion and edema also   previously seen. Mild splenomegaly. 4. No acute gastrointestinal abnormality. 5. Severe anasarca also previously seen. CT HEAD WO CONTRAST   Final Result   Extensive hypodensities involving bilateral cerebellar hemispheres and   bilateral globus pallidus highly suggestive of hypoxic ischemic injury. The extensive ischemic injury of the cerebellum is associated with   obliteration of sulci and cistern and 4th ventricle and development of mild   hydrocephalus with distension of temporal horns of the lateral ventricle. IR US GUIDED PARACENTESIS    (Results Pending)   CT HEAD WO CONTRAST    (Results Pending)                       Assessment/Plan :      1.  Donell 2/2 ATN from hypotension   Creat stable   Good UOP   Recommend to dose adjust all medications  based on renal functions  Maintain SBP> 90 mmHg   Daily weights   AVOID NSAIDs  Avoid Nephrotoxins  Monitor Intake/Output  Call if significant decrease in urine output      2. Acute respiratory failure   Pneumonia , COVID 19 infection   Intubated   Has edema    lasix iv     3. Drug abuse     4.  Lactic acidosis   Improved     Monitor closlsly             D/w primary team      Thank you for allowing us to participate in care of Negro Henley         Electronically signed by: Juan Carlos Bishop MD, 2/17/2023, 9:55 AM      [facility] [address]  Office : [phone]  Fax :[phone]

## 2023-02-17 NOTE — PROGRESS NOTES
Sam Irving contacted per protocol- [place] for withdraw of care- call with TOCHANA. Daja Alford, RN, BSN, CCRN.

## 2023-02-18 LAB
CULTURE, RESPIRATORY: NORMAL
GRAM STAIN RESULT: NORMAL
HEPATITIS BE ANTIBODY: POSITIVE
HEPATITIS BE ANTIGEN: NEGATIVE

## 2023-02-19 LAB
BLOOD CULTURE, ROUTINE: NORMAL
BODY FLUID CULTURE, STERILE: NORMAL
CULTURE, BLOOD 2: NORMAL
GRAM STAIN RESULT: NORMAL

## 2023-02-20 LAB
BODY FLUID CULTURE, STERILE: NORMAL
GRAM STAIN RESULT: NORMAL

## 2024-10-15 NOTE — PROGRESS NOTES
Rapid Response Quick Summary    Room: [place] 54445/6344-86    Assessment of concern / patient:  pale, cool, clammy, hypotensive SBP 80's, rectal bleeding, hypoxic. 1 unit PRBC hanging.     Physician involved:  Dr. Blessing Thrasher then Dr. Jose Webber    Interventions:  NRB mask, increased rate of PRBC 150/hr, CXR,     Disposition:  Transferred to [id number] no